# Patient Record
Sex: FEMALE | Race: WHITE | NOT HISPANIC OR LATINO | Employment: OTHER | URBAN - METROPOLITAN AREA
[De-identification: names, ages, dates, MRNs, and addresses within clinical notes are randomized per-mention and may not be internally consistent; named-entity substitution may affect disease eponyms.]

---

## 2018-01-01 ENCOUNTER — APPOINTMENT (OUTPATIENT)
Dept: LAB | Facility: CLINIC | Age: 78
End: 2018-01-01
Attending: INTERNAL MEDICINE
Payer: COMMERCIAL

## 2018-01-01 ENCOUNTER — APPOINTMENT (OUTPATIENT)
Dept: LAB | Facility: CLINIC | Age: 78
End: 2018-01-01
Attending: PHYSICIAN ASSISTANT
Payer: COMMERCIAL

## 2018-01-01 ENCOUNTER — DOCUMENTATION ONLY (OUTPATIENT)
Dept: RADIOLOGY | Facility: CLINIC | Age: 78
End: 2018-01-01

## 2018-01-01 ENCOUNTER — TELEPHONE (OUTPATIENT)
Dept: SURGERY | Facility: CLINIC | Age: 78
End: 2018-01-01

## 2018-01-01 ENCOUNTER — COMMUNICATION - HEALTHEAST (OUTPATIENT)
Dept: SCHEDULING | Facility: CLINIC | Age: 78
End: 2018-01-01

## 2018-01-01 ENCOUNTER — OFFICE VISIT - HEALTHEAST (OUTPATIENT)
Dept: FAMILY MEDICINE | Facility: CLINIC | Age: 78
End: 2018-01-01

## 2018-01-01 ENCOUNTER — ALLIED HEALTH/NURSE VISIT (OUTPATIENT)
Dept: ONCOLOGY | Facility: CLINIC | Age: 78
End: 2018-01-01

## 2018-01-01 ENCOUNTER — COMMUNICATION - HEALTHEAST (OUTPATIENT)
Dept: CARE COORDINATION | Facility: CLINIC | Age: 78
End: 2018-01-01

## 2018-01-01 ENCOUNTER — RESEARCH ENCOUNTER (OUTPATIENT)
Dept: ONCOLOGY | Facility: CLINIC | Age: 78
End: 2018-01-01

## 2018-01-01 ENCOUNTER — CARE COORDINATION (OUTPATIENT)
Dept: ONCOLOGY | Facility: CLINIC | Age: 78
End: 2018-01-01

## 2018-01-01 ENCOUNTER — TELEPHONE (OUTPATIENT)
Dept: ONCOLOGY | Facility: CLINIC | Age: 78
End: 2018-01-01

## 2018-01-01 ENCOUNTER — COMMUNICATION - HEALTHEAST (OUTPATIENT)
Dept: FAMILY MEDICINE | Facility: CLINIC | Age: 78
End: 2018-01-01

## 2018-01-01 ENCOUNTER — RECORDS - HEALTHEAST (OUTPATIENT)
Dept: ADMINISTRATIVE | Facility: OTHER | Age: 78
End: 2018-01-01

## 2018-01-01 ENCOUNTER — HOSPITAL ENCOUNTER (INPATIENT)
Facility: CLINIC | Age: 78
LOS: 3 days | Discharge: HOME HEALTH | DRG: 391 | End: 2018-12-08
Attending: INTERNAL MEDICINE | Admitting: INTERNAL MEDICINE
Payer: COMMERCIAL

## 2018-01-01 ENCOUNTER — HOME CARE/HOSPICE - HEALTHEAST (OUTPATIENT)
Dept: HOSPICE | Facility: HOSPICE | Age: 78
End: 2018-01-01

## 2018-01-01 ENCOUNTER — HOSPITAL ENCOUNTER (OUTPATIENT)
Dept: CT IMAGING | Facility: CLINIC | Age: 78
Discharge: HOME | End: 2018-10-24
Attending: FAMILY MEDICINE

## 2018-01-01 ENCOUNTER — RADIANT APPOINTMENT (OUTPATIENT)
Dept: RADIOLOGY | Facility: AMBULATORY SURGERY CENTER | Age: 78
End: 2018-01-01
Attending: INTERNAL MEDICINE
Payer: COMMERCIAL

## 2018-01-01 ENCOUNTER — PRE VISIT (OUTPATIENT)
Dept: SURGERY | Facility: CLINIC | Age: 78
End: 2018-01-01

## 2018-01-01 ENCOUNTER — ONCOLOGY VISIT (OUTPATIENT)
Dept: ONCOLOGY | Facility: CLINIC | Age: 78
End: 2018-01-01
Attending: PHYSICIAN ASSISTANT
Payer: COMMERCIAL

## 2018-01-01 ENCOUNTER — COMMUNICATION - HEALTHEAST (OUTPATIENT)
Dept: NURSING | Facility: CLINIC | Age: 78
End: 2018-01-01

## 2018-01-01 ENCOUNTER — RADIANT APPOINTMENT (OUTPATIENT)
Dept: GENERAL RADIOLOGY | Facility: CLINIC | Age: 78
End: 2018-01-01
Payer: COMMERCIAL

## 2018-01-01 ENCOUNTER — AMBULATORY - HEALTHEAST (OUTPATIENT)
Dept: LAB | Facility: CLINIC | Age: 78
End: 2018-01-01

## 2018-01-01 ENCOUNTER — HOME CARE/HOSPICE - HEALTHEAST (OUTPATIENT)
Dept: HOME HEALTH SERVICES | Facility: HOME HEALTH | Age: 78
End: 2018-01-01

## 2018-01-01 ENCOUNTER — TRANSFERRED RECORDS (OUTPATIENT)
Dept: HEALTH INFORMATION MANAGEMENT | Facility: CLINIC | Age: 78
End: 2018-01-01

## 2018-01-01 ENCOUNTER — APPOINTMENT (OUTPATIENT)
Dept: OCCUPATIONAL THERAPY | Facility: CLINIC | Age: 78
DRG: 391 | End: 2018-01-01
Attending: PHYSICIAN ASSISTANT
Payer: COMMERCIAL

## 2018-01-01 ENCOUNTER — AMBULATORY - HEALTHEAST (OUTPATIENT)
Dept: FAMILY MEDICINE | Facility: CLINIC | Age: 78
End: 2018-01-01

## 2018-01-01 ENCOUNTER — HOSPITAL ENCOUNTER (OUTPATIENT)
Dept: MRI IMAGING | Facility: HOSPITAL | Age: 78
Discharge: HOME | End: 2018-10-30
Attending: THORACIC SURGERY (CARDIOTHORACIC VASCULAR SURGERY)

## 2018-01-01 ENCOUNTER — PRE VISIT (OUTPATIENT)
Dept: ONCOLOGY | Facility: CLINIC | Age: 78
End: 2018-01-01

## 2018-01-01 ENCOUNTER — PATIENT OUTREACH (OUTPATIENT)
Dept: CARE COORDINATION | Facility: CLINIC | Age: 78
End: 2018-01-01

## 2018-01-01 ENCOUNTER — HOSPITAL ENCOUNTER (OUTPATIENT)
Dept: MRI IMAGING | Facility: HOSPITAL | Age: 78
Discharge: HOME | End: 2018-10-31
Attending: THORACIC SURGERY (CARDIOTHORACIC VASCULAR SURGERY)

## 2018-01-01 ENCOUNTER — HOSPITAL ENCOUNTER (OUTPATIENT)
Facility: AMBULATORY SURGERY CENTER | Age: 78
End: 2018-11-20
Attending: PHYSICIAN ASSISTANT
Payer: COMMERCIAL

## 2018-01-01 ENCOUNTER — SURGERY (OUTPATIENT)
Age: 78
End: 2018-01-01

## 2018-01-01 ENCOUNTER — INFUSION THERAPY VISIT (OUTPATIENT)
Dept: ONCOLOGY | Facility: CLINIC | Age: 78
DRG: 391 | End: 2018-01-01
Attending: INTERNAL MEDICINE
Payer: COMMERCIAL

## 2018-01-01 ENCOUNTER — ONCOLOGY VISIT (OUTPATIENT)
Dept: ONCOLOGY | Facility: CLINIC | Age: 78
End: 2018-01-01
Attending: INTERNAL MEDICINE
Payer: COMMERCIAL

## 2018-01-01 ENCOUNTER — HOSPITAL ENCOUNTER (OUTPATIENT)
Facility: CLINIC | Age: 78
End: 2018-01-01
Payer: COMMERCIAL

## 2018-01-01 ENCOUNTER — TELEPHONE (OUTPATIENT)
Dept: INTERVENTIONAL RADIOLOGY/VASCULAR | Facility: CLINIC | Age: 78
End: 2018-01-01

## 2018-01-01 ENCOUNTER — HOSPITAL ENCOUNTER (OUTPATIENT)
Dept: RADIOLOGY | Facility: HOSPITAL | Age: 78
Discharge: HOME | End: 2018-11-01
Attending: RADIOLOGY

## 2018-01-01 ENCOUNTER — ANESTHESIA - HEALTHEAST (OUTPATIENT)
Dept: SURGERY | Facility: CLINIC | Age: 78
End: 2018-01-01

## 2018-01-01 ENCOUNTER — ONCOLOGY VISIT (OUTPATIENT)
Dept: ONCOLOGY | Facility: CLINIC | Age: 78
DRG: 391 | End: 2018-01-01
Attending: PHYSICIAN ASSISTANT
Payer: COMMERCIAL

## 2018-01-01 ENCOUNTER — ANESTHESIA EVENT (OUTPATIENT)
Dept: SURGERY | Facility: AMBULATORY SURGERY CENTER | Age: 78
End: 2018-01-01

## 2018-01-01 ENCOUNTER — APPOINTMENT (OUTPATIENT)
Dept: CARDIOLOGY | Facility: CLINIC | Age: 78
DRG: 391 | End: 2018-01-01
Attending: INTERNAL MEDICINE
Payer: COMMERCIAL

## 2018-01-01 ENCOUNTER — ANESTHESIA (OUTPATIENT)
Dept: SURGERY | Facility: AMBULATORY SURGERY CENTER | Age: 78
End: 2018-01-01

## 2018-01-01 ENCOUNTER — OFFICE VISIT - HEALTHEAST (OUTPATIENT)
Dept: PULMONOLOGY | Facility: OTHER | Age: 78
End: 2018-01-01

## 2018-01-01 ENCOUNTER — HOSPITAL ENCOUNTER (OUTPATIENT)
Dept: PET IMAGING | Facility: CLINIC | Age: 78
Setting detail: NUCLEAR MEDICINE
Discharge: HOME | End: 2018-11-13
Attending: CLINICAL NURSE SPECIALIST | Admitting: CLINICAL NURSE SPECIALIST
Payer: COMMERCIAL

## 2018-01-01 ENCOUNTER — SURGERY - HEALTHEAST (OUTPATIENT)
Dept: SURGERY | Facility: CLINIC | Age: 78
End: 2018-01-01

## 2018-01-01 ENCOUNTER — APPOINTMENT (OUTPATIENT)
Dept: GENERAL RADIOLOGY | Facility: CLINIC | Age: 78
DRG: 391 | End: 2018-01-01
Attending: PHYSICIAN ASSISTANT
Payer: COMMERCIAL

## 2018-01-01 ENCOUNTER — COMMUNICATION - HEALTHEAST (OUTPATIENT)
Dept: HOME HEALTH SERVICES | Facility: HOME HEALTH | Age: 78
End: 2018-01-01

## 2018-01-01 ENCOUNTER — NURSE TRIAGE (OUTPATIENT)
Dept: NURSING | Facility: CLINIC | Age: 78
End: 2018-01-01

## 2018-01-01 ENCOUNTER — HOSPITAL ENCOUNTER (OUTPATIENT)
Dept: CARDIOLOGY | Facility: CLINIC | Age: 78
Discharge: HOME | End: 2018-11-07
Attending: CLINICAL NURSE SPECIALIST | Admitting: CLINICAL NURSE SPECIALIST
Payer: COMMERCIAL

## 2018-01-01 ENCOUNTER — OFFICE VISIT (OUTPATIENT)
Dept: SURGERY | Facility: CLINIC | Age: 78
End: 2018-01-01
Attending: THORACIC SURGERY (CARDIOTHORACIC VASCULAR SURGERY)
Payer: COMMERCIAL

## 2018-01-01 ENCOUNTER — HOSPITAL ENCOUNTER (OUTPATIENT)
Dept: CT IMAGING | Facility: HOSPITAL | Age: 78
Discharge: HOME | End: 2018-11-01
Attending: THORACIC SURGERY (CARDIOTHORACIC VASCULAR SURGERY) | Admitting: RADIOLOGY

## 2018-01-01 ENCOUNTER — APPOINTMENT (OUTPATIENT)
Dept: CT IMAGING | Facility: CLINIC | Age: 78
DRG: 391 | End: 2018-01-01
Attending: INTERNAL MEDICINE
Payer: COMMERCIAL

## 2018-01-01 ENCOUNTER — AMBULATORY - HEALTHEAST (OUTPATIENT)
Dept: CARE COORDINATION | Facility: CLINIC | Age: 78
End: 2018-01-01

## 2018-01-01 VITALS
HEIGHT: 62 IN | SYSTOLIC BLOOD PRESSURE: 180 MMHG | HEART RATE: 104 BPM | TEMPERATURE: 97.7 F | RESPIRATION RATE: 16 BRPM | BODY MASS INDEX: 23.13 KG/M2 | DIASTOLIC BLOOD PRESSURE: 82 MMHG | WEIGHT: 125.7 LBS | OXYGEN SATURATION: 97 %

## 2018-01-01 VITALS
SYSTOLIC BLOOD PRESSURE: 129 MMHG | HEART RATE: 104 BPM | BODY MASS INDEX: 22.8 KG/M2 | DIASTOLIC BLOOD PRESSURE: 79 MMHG | TEMPERATURE: 98.9 F | OXYGEN SATURATION: 91 % | RESPIRATION RATE: 16 BRPM | WEIGHT: 123.9 LBS | HEIGHT: 62 IN

## 2018-01-01 VITALS
RESPIRATION RATE: 16 BRPM | DIASTOLIC BLOOD PRESSURE: 71 MMHG | WEIGHT: 121 LBS | OXYGEN SATURATION: 95 % | TEMPERATURE: 98.1 F | HEART RATE: 98 BPM | SYSTOLIC BLOOD PRESSURE: 102 MMHG | HEIGHT: 62 IN | BODY MASS INDEX: 22.26 KG/M2

## 2018-01-01 VITALS
TEMPERATURE: 97.7 F | HEART RATE: 96 BPM | BODY MASS INDEX: 22.92 KG/M2 | SYSTOLIC BLOOD PRESSURE: 113 MMHG | HEIGHT: 62 IN | DIASTOLIC BLOOD PRESSURE: 74 MMHG | WEIGHT: 124.56 LBS | OXYGEN SATURATION: 94 %

## 2018-01-01 VITALS
HEART RATE: 86 BPM | DIASTOLIC BLOOD PRESSURE: 87 MMHG | BODY MASS INDEX: 21.89 KG/M2 | SYSTOLIC BLOOD PRESSURE: 148 MMHG | TEMPERATURE: 97.6 F | OXYGEN SATURATION: 94 % | WEIGHT: 119.7 LBS

## 2018-01-01 VITALS
WEIGHT: 115.5 LBS | HEART RATE: 80 BPM | BODY MASS INDEX: 21.13 KG/M2 | DIASTOLIC BLOOD PRESSURE: 72 MMHG | OXYGEN SATURATION: 97 % | TEMPERATURE: 98.4 F | SYSTOLIC BLOOD PRESSURE: 109 MMHG

## 2018-01-01 VITALS
SYSTOLIC BLOOD PRESSURE: 138 MMHG | HEART RATE: 73 BPM | TEMPERATURE: 96.5 F | BODY MASS INDEX: 24.33 KG/M2 | DIASTOLIC BLOOD PRESSURE: 87 MMHG | WEIGHT: 133 LBS | OXYGEN SATURATION: 95 % | RESPIRATION RATE: 18 BRPM

## 2018-01-01 VITALS
RESPIRATION RATE: 16 BRPM | DIASTOLIC BLOOD PRESSURE: 66 MMHG | TEMPERATURE: 97.5 F | OXYGEN SATURATION: 93 % | HEART RATE: 100 BPM | WEIGHT: 122.1 LBS | BODY MASS INDEX: 22.33 KG/M2 | SYSTOLIC BLOOD PRESSURE: 124 MMHG

## 2018-01-01 VITALS
DIASTOLIC BLOOD PRESSURE: 84 MMHG | OXYGEN SATURATION: 97 % | TEMPERATURE: 97.7 F | HEART RATE: 78 BPM | RESPIRATION RATE: 18 BRPM | SYSTOLIC BLOOD PRESSURE: 144 MMHG

## 2018-01-01 VITALS
TEMPERATURE: 98.2 F | DIASTOLIC BLOOD PRESSURE: 85 MMHG | OXYGEN SATURATION: 94 % | BODY MASS INDEX: 22.2 KG/M2 | SYSTOLIC BLOOD PRESSURE: 144 MMHG | WEIGHT: 121.4 LBS | HEART RATE: 105 BPM

## 2018-01-01 VITALS
SYSTOLIC BLOOD PRESSURE: 117 MMHG | TEMPERATURE: 98.8 F | BODY MASS INDEX: 22.33 KG/M2 | OXYGEN SATURATION: 91 % | RESPIRATION RATE: 18 BRPM | HEART RATE: 121 BPM | DIASTOLIC BLOOD PRESSURE: 75 MMHG | WEIGHT: 122.1 LBS

## 2018-01-01 DIAGNOSIS — C34.92 NON-SMALL CELL CANCER OF LEFT LUNG (H): Primary | ICD-10-CM

## 2018-01-01 DIAGNOSIS — R63.4 WEIGHT LOSS: ICD-10-CM

## 2018-01-01 DIAGNOSIS — I10 HYPERTENSION: ICD-10-CM

## 2018-01-01 DIAGNOSIS — D62 ANEMIA DUE TO BLOOD LOSS, ACUTE: Primary | ICD-10-CM

## 2018-01-01 DIAGNOSIS — M25.512 LEFT SHOULDER PAIN: ICD-10-CM

## 2018-01-01 DIAGNOSIS — R63.0 ANOREXIA: ICD-10-CM

## 2018-01-01 DIAGNOSIS — R11.2 NAUSEA AND VOMITING: ICD-10-CM

## 2018-01-01 DIAGNOSIS — E87.1 HYPONATREMIA: ICD-10-CM

## 2018-01-01 DIAGNOSIS — R09.89 LUNG CRACKLES: ICD-10-CM

## 2018-01-01 DIAGNOSIS — R64 MALIGNANT CACHEXIA (H): ICD-10-CM

## 2018-01-01 DIAGNOSIS — I48.91 ATRIAL FIBRILLATION, UNSPECIFIED TYPE (H): ICD-10-CM

## 2018-01-01 DIAGNOSIS — J98.59 MEDIASTINAL MASS: ICD-10-CM

## 2018-01-01 DIAGNOSIS — C34.12 MALIGNANT NEOPLASM OF UPPER LOBE OF LEFT LUNG (H): ICD-10-CM

## 2018-01-01 DIAGNOSIS — E87.5 HYPERKALEMIA: ICD-10-CM

## 2018-01-01 DIAGNOSIS — I10 HTN (HYPERTENSION): ICD-10-CM

## 2018-01-01 DIAGNOSIS — R91.8 MASS OF LEFT LUNG: ICD-10-CM

## 2018-01-01 DIAGNOSIS — D62 ANEMIA DUE TO BLOOD LOSS, ACUTE: ICD-10-CM

## 2018-01-01 DIAGNOSIS — R91.8 MASS OF LUNG: ICD-10-CM

## 2018-01-01 DIAGNOSIS — C34.92 NON-SMALL CELL CANCER OF LEFT LUNG (H): ICD-10-CM

## 2018-01-01 DIAGNOSIS — J90 PLEURAL EFFUSION: ICD-10-CM

## 2018-01-01 DIAGNOSIS — R11.2 NAUSEA AND VOMITING, INTRACTABILITY OF VOMITING NOT SPECIFIED, UNSPECIFIED VOMITING TYPE: ICD-10-CM

## 2018-01-01 DIAGNOSIS — I48.0 PAROXYSMAL ATRIAL FIBRILLATION (H): ICD-10-CM

## 2018-01-01 DIAGNOSIS — Z51.89 ENCOUNTER FOR OTHER SPECIFIED AFTERCARE: ICD-10-CM

## 2018-01-01 DIAGNOSIS — C34.92 NON-SMALL CELL CARCINOMA OF LEFT LUNG, STAGE 4 (H): ICD-10-CM

## 2018-01-01 DIAGNOSIS — E86.0 ACUTE DEHYDRATION: Primary | ICD-10-CM

## 2018-01-01 DIAGNOSIS — R53.83 FATIGUE: ICD-10-CM

## 2018-01-01 DIAGNOSIS — J98.59 MEDIASTINAL MASS: Primary | ICD-10-CM

## 2018-01-01 DIAGNOSIS — R11.0 NAUSEA: ICD-10-CM

## 2018-01-01 DIAGNOSIS — Z71.9 VISIT FOR COUNSELING: Primary | ICD-10-CM

## 2018-01-01 DIAGNOSIS — Z23 NEED FOR VACCINATION: ICD-10-CM

## 2018-01-01 DIAGNOSIS — C34.12 MALIGNANT NEOPLASM OF UPPER LOBE OF LEFT LUNG (H): Primary | ICD-10-CM

## 2018-01-01 DIAGNOSIS — F17.200 TOBACCO USE DISORDER: ICD-10-CM

## 2018-01-01 DIAGNOSIS — M62.81 GENERALIZED MUSCLE WEAKNESS: ICD-10-CM

## 2018-01-01 DIAGNOSIS — K40.90 NON-RECURRENT UNILATERAL INGUINAL HERNIA WITHOUT OBSTRUCTION OR GANGRENE: ICD-10-CM

## 2018-01-01 DIAGNOSIS — E83.42 HYPOMAGNESEMIA: ICD-10-CM

## 2018-01-01 DIAGNOSIS — K59.09 OTHER CONSTIPATION: Primary | ICD-10-CM

## 2018-01-01 DIAGNOSIS — E87.6 HYPOKALEMIA: ICD-10-CM

## 2018-01-01 DIAGNOSIS — K59.00 CONSTIPATION, UNSPECIFIED CONSTIPATION TYPE: ICD-10-CM

## 2018-01-01 DIAGNOSIS — E86.0 DEHYDRATION: ICD-10-CM

## 2018-01-01 DIAGNOSIS — R07.9 ACUTE CHEST PAIN: ICD-10-CM

## 2018-01-01 DIAGNOSIS — D62 ACUTE BLOOD LOSS ANEMIA: ICD-10-CM

## 2018-01-01 DIAGNOSIS — K59.09 OTHER CONSTIPATION: ICD-10-CM

## 2018-01-01 DIAGNOSIS — Z51.11 ENCOUNTER FOR ANTINEOPLASTIC CHEMOTHERAPY: ICD-10-CM

## 2018-01-01 DIAGNOSIS — I10 BENIGN ESSENTIAL HYPERTENSION: ICD-10-CM

## 2018-01-01 DIAGNOSIS — C34.92 SQUAMOUS CELL CARCINOMA OF LEFT LUNG (H): ICD-10-CM

## 2018-01-01 DIAGNOSIS — F17.219 CIGARETTE NICOTINE DEPENDENCE WITH NICOTINE-INDUCED DISORDER: ICD-10-CM

## 2018-01-01 DIAGNOSIS — K26.4 GASTROINTESTINAL HEMORRHAGE ASSOCIATED WITH DUODENAL ULCER: ICD-10-CM

## 2018-01-01 DIAGNOSIS — R63.4 LOSS OF WEIGHT: ICD-10-CM

## 2018-01-01 DIAGNOSIS — F41.9 ANXIETY: ICD-10-CM

## 2018-01-01 DIAGNOSIS — K59.03 DRUG-INDUCED CONSTIPATION: ICD-10-CM

## 2018-01-01 DIAGNOSIS — I10 ESSENTIAL HYPERTENSION: ICD-10-CM

## 2018-01-01 LAB
ABO + RH BLD: ABNORMAL
ACTH PLAS-MCNC: 19 PG/ML
ALBUMIN SERPL-MCNC: 2.1 G/DL (ref 3.4–5)
ALBUMIN SERPL-MCNC: 2.3 G/DL (ref 3.4–5)
ALBUMIN SERPL-MCNC: 2.6 G/DL (ref 3.4–5)
ALBUMIN SERPL-MCNC: 2.6 G/DL (ref 3.4–5)
ALBUMIN SERPL-MCNC: 3 G/DL (ref 3.4–5)
ALBUMIN SERPL-MCNC: 3.7 G/DL (ref 3.5–5)
ALBUMIN SERPL-MCNC: 3.9 G/DL (ref 3.5–5)
ALBUMIN SERPL-MCNC: 4 G/DL (ref 3.5–5)
ALBUMIN UR-MCNC: 30 MG/DL
ALP SERPL-CCNC: 100 U/L (ref 40–150)
ALP SERPL-CCNC: 134 U/L (ref 40–150)
ALP SERPL-CCNC: 136 U/L (ref 40–150)
ALP SERPL-CCNC: 145 U/L (ref 40–150)
ALP SERPL-CCNC: 149 U/L (ref 40–150)
ALP SERPL-CCNC: 61 U/L (ref 45–120)
ALP SERPL-CCNC: 64 U/L (ref 45–120)
ALP SERPL-CCNC: 65 U/L (ref 45–120)
ALP SERPL-CCNC: 66 U/L (ref 45–120)
ALP SERPL-CCNC: 66 U/L (ref 45–120)
ALT SERPL W P-5'-P-CCNC: 12 U/L (ref 0–45)
ALT SERPL W P-5'-P-CCNC: 13 U/L (ref 0–45)
ALT SERPL W P-5'-P-CCNC: 15 U/L (ref 0–45)
ALT SERPL W P-5'-P-CCNC: 17 U/L (ref 0–50)
ALT SERPL W P-5'-P-CCNC: 18 U/L (ref 0–50)
ALT SERPL W P-5'-P-CCNC: 19 U/L (ref 0–45)
ALT SERPL W P-5'-P-CCNC: 20 U/L (ref 0–45)
ALT SERPL W P-5'-P-CCNC: 27 U/L (ref 0–50)
ALT SERPL W P-5'-P-CCNC: 33 U/L (ref 0–50)
ALT SERPL W P-5'-P-CCNC: 34 U/L (ref 0–50)
ANION GAP SERPL CALCULATED.3IONS-SCNC: 10 MMOL/L (ref 5–18)
ANION GAP SERPL CALCULATED.3IONS-SCNC: 10 MMOL/L (ref 5–18)
ANION GAP SERPL CALCULATED.3IONS-SCNC: 11 MMOL/L (ref 3–14)
ANION GAP SERPL CALCULATED.3IONS-SCNC: 11 MMOL/L (ref 5–18)
ANION GAP SERPL CALCULATED.3IONS-SCNC: 12 MMOL/L (ref 3–14)
ANION GAP SERPL CALCULATED.3IONS-SCNC: 12 MMOL/L (ref 5–18)
ANION GAP SERPL CALCULATED.3IONS-SCNC: 12 MMOL/L (ref 5–18)
ANION GAP SERPL CALCULATED.3IONS-SCNC: 7 MMOL/L (ref 3–14)
ANION GAP SERPL CALCULATED.3IONS-SCNC: 7 MMOL/L (ref 3–14)
ANION GAP SERPL CALCULATED.3IONS-SCNC: 8 MMOL/L (ref 3–14)
ANISOCYTOSIS BLD QL SMEAR: SLIGHT
ANISOCYTOSIS BLD QL SMEAR: SLIGHT
APPEARANCE UR: CLEAR
APTT PPP: 37 SEC (ref 22–37)
AST SERPL W P-5'-P-CCNC: 15 U/L (ref 0–40)
AST SERPL W P-5'-P-CCNC: 16 U/L (ref 0–40)
AST SERPL W P-5'-P-CCNC: 16 U/L (ref 0–40)
AST SERPL W P-5'-P-CCNC: 17 U/L (ref 0–40)
AST SERPL W P-5'-P-CCNC: 17 U/L (ref 0–45)
AST SERPL W P-5'-P-CCNC: 18 U/L (ref 0–40)
AST SERPL W P-5'-P-CCNC: 18 U/L (ref 0–45)
AST SERPL W P-5'-P-CCNC: 28 U/L (ref 0–45)
AST SERPL W P-5'-P-CCNC: 30 U/L (ref 0–45)
AST SERPL W P-5'-P-CCNC: 33 U/L (ref 0–45)
BASOPHILS # BLD AUTO: 0 10E9/L (ref 0–0.2)
BASOPHILS # BLD AUTO: 0 THOU/UL (ref 0–0.2)
BASOPHILS # BLD AUTO: 0.1 10E9/L (ref 0–0.2)
BASOPHILS # BLD AUTO: 0.1 THOU/UL (ref 0–0.2)
BASOPHILS NFR BLD AUTO: 0 %
BASOPHILS NFR BLD AUTO: 0 % (ref 0–2)
BASOPHILS NFR BLD AUTO: 0.1 %
BASOPHILS NFR BLD AUTO: 0.4 %
BASOPHILS NFR BLD AUTO: 0.4 %
BASOPHILS NFR BLD AUTO: 0.6 %
BASOPHILS NFR BLD AUTO: 1 % (ref 0–2)
BILIRUB SERPL-MCNC: 0.2 MG/DL (ref 0.2–1.3)
BILIRUB SERPL-MCNC: 0.2 MG/DL (ref 0–1)
BILIRUB SERPL-MCNC: 0.3 MG/DL (ref 0.2–1.3)
BILIRUB SERPL-MCNC: 0.3 MG/DL (ref 0–1)
BILIRUB SERPL-MCNC: 0.4 MG/DL (ref 0.2–1.3)
BILIRUB SERPL-MCNC: 0.4 MG/DL (ref 0.2–1.3)
BILIRUB SERPL-MCNC: 0.4 MG/DL (ref 0–1)
BILIRUB SERPL-MCNC: 0.6 MG/DL (ref 0.2–1.3)
BILIRUB UR QL STRIP: NEGATIVE
BLD GP AB INVEST PLASRBC-IMP: ABNORMAL
BLD GP AB SCN SERPL QL: ABNORMAL
BLD GP AB SCN SERPL QL: ABNORMAL
BLD PROD TYP BPU: ABNORMAL
BLD PROD TYP BPU: ABNORMAL
BLD PROD TYP BPU: NORMAL
BLD PROD TYP BPU: NORMAL
BLD UNIT ID BPU: 0
BLD UNIT ID BPU: 0
BLOOD BANK CMNT PATIENT-IMP: ABNORMAL
BLOOD PRODUCT CODE: NORMAL
BLOOD PRODUCT CODE: NORMAL
BPU ID: NORMAL
BPU ID: NORMAL
BUN SERPL-MCNC: 10 MG/DL (ref 7–30)
BUN SERPL-MCNC: 17 MG/DL (ref 8–28)
BUN SERPL-MCNC: 19 MG/DL (ref 7–30)
BUN SERPL-MCNC: 22 MG/DL (ref 7–30)
BUN SERPL-MCNC: 22 MG/DL (ref 8–28)
BUN SERPL-MCNC: 27 MG/DL (ref 8–28)
BUN SERPL-MCNC: 29 MG/DL (ref 8–28)
BUN SERPL-MCNC: 30 MG/DL (ref 8–28)
BURR CELLS BLD QL SMEAR: ABNORMAL
BURR CELLS BLD QL SMEAR: SLIGHT
CALCIUM SERPL-MCNC: 10.1 MG/DL (ref 8.5–10.5)
CALCIUM SERPL-MCNC: 10.2 MG/DL (ref 8.5–10.5)
CALCIUM SERPL-MCNC: 7.5 MG/DL (ref 8.5–10.1)
CALCIUM SERPL-MCNC: 8 MG/DL (ref 8.5–10.1)
CALCIUM SERPL-MCNC: 8.1 MG/DL (ref 8.5–10.1)
CALCIUM SERPL-MCNC: 8.3 MG/DL (ref 8.5–10.1)
CALCIUM SERPL-MCNC: 8.3 MG/DL (ref 8.5–10.1)
CALCIUM SERPL-MCNC: 8.4 MG/DL (ref 8.5–10.1)
CALCIUM SERPL-MCNC: 9.1 MG/DL (ref 8.5–10.1)
CALCIUM SERPL-MCNC: 9.6 MG/DL (ref 8.5–10.5)
CALCIUM SERPL-MCNC: 9.7 MG/DL (ref 8.5–10.5)
CALCIUM SERPL-MCNC: 9.9 MG/DL (ref 8.5–10.5)
CCTA EJECTION FRACTION: 85 %
CCTA INTERVENTRICULAR SETPUM: 1.1 CM (ref 0.6–1.1)
CCTA LEFT INTERNAL DIMENSION IN SYSTOLE: 2.3 CM (ref 2.1–4)
CCTA LEFT VENTRICULAR INTERNAL DIMENSION IN DIASTOLE: 4.6 CM (ref 3.5–6)
CCTA LEFT VENTRICULAR MASS: 158.81 G
CCTA POSTERIOR WALL: 0.9 CM (ref 0.6–1.1)
CHLORIDE BLD-SCNC: 101 MMOL/L (ref 98–107)
CHLORIDE BLD-SCNC: 94 MMOL/L (ref 98–107)
CHLORIDE BLD-SCNC: 95 MMOL/L (ref 98–107)
CHLORIDE BLD-SCNC: 96 MMOL/L (ref 98–107)
CHLORIDE BLD-SCNC: 99 MMOL/L (ref 98–107)
CHLORIDE SERPL-SCNC: 105 MMOL/L (ref 94–109)
CHLORIDE SERPL-SCNC: 92 MMOL/L (ref 94–109)
CHLORIDE SERPL-SCNC: 93 MMOL/L (ref 94–109)
CHLORIDE SERPL-SCNC: 94 MMOL/L (ref 94–109)
CHLORIDE SERPL-SCNC: 96 MMOL/L (ref 94–109)
CHLORIDE SERPL-SCNC: 97 MMOL/L (ref 94–109)
CHLORIDE SERPL-SCNC: 97 MMOL/L (ref 94–109)
CHOLEST SERPL-MCNC: 207 MG/DL
CO2 SERPL-SCNC: 23 MMOL/L (ref 20–32)
CO2 SERPL-SCNC: 23 MMOL/L (ref 20–32)
CO2 SERPL-SCNC: 24 MMOL/L (ref 20–32)
CO2 SERPL-SCNC: 24 MMOL/L (ref 22–31)
CO2 SERPL-SCNC: 24 MMOL/L (ref 22–31)
CO2 SERPL-SCNC: 25 MMOL/L (ref 20–32)
CO2 SERPL-SCNC: 27 MMOL/L (ref 22–31)
CO2 SERPL-SCNC: 27 MMOL/L (ref 22–31)
CO2 SERPL-SCNC: 28 MMOL/L (ref 20–32)
CO2 SERPL-SCNC: 28 MMOL/L (ref 22–31)
COLOR UR AUTO: YELLOW
COPATH REPORT: NORMAL
CREAT BLD-MCNC: 1 MG/DL (ref 0.52–1.04)
CREAT SERPL-MCNC: 0.56 MG/DL (ref 0.52–1.04)
CREAT SERPL-MCNC: 0.59 MG/DL (ref 0.52–1.04)
CREAT SERPL-MCNC: 0.61 MG/DL (ref 0.52–1.04)
CREAT SERPL-MCNC: 0.62 MG/DL (ref 0.52–1.04)
CREAT SERPL-MCNC: 0.63 MG/DL (ref 0.52–1.04)
CREAT SERPL-MCNC: 0.66 MG/DL (ref 0.52–1.04)
CREAT SERPL-MCNC: 0.67 MG/DL (ref 0.52–1.04)
CREAT SERPL-MCNC: 0.7 MG/DL (ref 0.6–1.1)
CREAT SERPL-MCNC: 0.73 MG/DL (ref 0.6–1.1)
CREAT SERPL-MCNC: 0.74 MG/DL (ref 0.6–1.1)
CREAT SERPL-MCNC: 0.75 MG/DL (ref 0.6–1.1)
CREAT SERPL-MCNC: 0.76 MG/DL (ref 0.52–1.04)
CREAT SERPL-MCNC: 0.78 MG/DL (ref 0.6–1.1)
DIFFERENTIAL METHOD BLD: ABNORMAL
DLCOUNC-%PRED-PRE: 65 %
DLCOUNC-PRE: 12.47 ML/MIN/MMHG
DLCOUNC-PRED: 18.91 ML/MIN/MMHG
EOSINOPHIL # BLD AUTO: 0 10E9/L (ref 0–0.7)
EOSINOPHIL # BLD AUTO: 0.1 10E9/L (ref 0–0.7)
EOSINOPHIL # BLD AUTO: 0.2 10E9/L (ref 0–0.7)
EOSINOPHIL # BLD AUTO: 0.2 10E9/L (ref 0–0.7)
EOSINOPHIL # BLD AUTO: 0.2 THOU/UL (ref 0–0.4)
EOSINOPHIL # BLD AUTO: 0.3 10E9/L (ref 0–0.7)
EOSINOPHIL # BLD AUTO: 0.3 THOU/UL (ref 0–0.4)
EOSINOPHIL NFR BLD AUTO: 0 %
EOSINOPHIL NFR BLD AUTO: 0.4 %
EOSINOPHIL NFR BLD AUTO: 0.9 %
EOSINOPHIL NFR BLD AUTO: 2 % (ref 0–6)
EOSINOPHIL NFR BLD AUTO: 2.4 %
EOSINOPHIL NFR BLD AUTO: 3 % (ref 0–6)
EOSINOPHIL NFR BLD AUTO: 3.7 %
ERV-%PRED-PRE: 51 %
ERV-PRE: 0.35 L
ERV-PRED: 0.67 L
ERYTHROCYTE [DISTWIDTH] IN BLOOD BY AUTOMATED COUNT: 12.7 % (ref 11–14.5)
ERYTHROCYTE [DISTWIDTH] IN BLOOD BY AUTOMATED COUNT: 13.2 % (ref 10–15)
ERYTHROCYTE [DISTWIDTH] IN BLOOD BY AUTOMATED COUNT: 13.2 % (ref 10–15)
ERYTHROCYTE [DISTWIDTH] IN BLOOD BY AUTOMATED COUNT: 13.3 % (ref 10–15)
ERYTHROCYTE [DISTWIDTH] IN BLOOD BY AUTOMATED COUNT: 14.1 % (ref 10–15)
ERYTHROCYTE [DISTWIDTH] IN BLOOD BY AUTOMATED COUNT: 14.5 % (ref 10–15)
ERYTHROCYTE [DISTWIDTH] IN BLOOD BY AUTOMATED COUNT: 14.5 % (ref 11–14.5)
ERYTHROCYTE [DISTWIDTH] IN BLOOD BY AUTOMATED COUNT: 15.8 % (ref 10–15)
ERYTHROCYTE [DISTWIDTH] IN BLOOD BY AUTOMATED COUNT: 16.9 % (ref 10–15)
EXPTIME-PRE: 6.02 SEC
FASTING STATUS PATIENT QL REPORTED: NO
FEF2575-%PRED-POST: 52 %
FEF2575-%PRED-PRE: 29 %
FEF2575-POST: 0.82 L/SEC
FEF2575-PRE: 0.45 L/SEC
FEF2575-PRED: 1.56 L/SEC
FEFMAX-%PRED-PRE: 33 %
FEFMAX-PRE: 1.59 L/SEC
FEFMAX-PRED: 4.75 L/SEC
FEV1-%PRED-PRE: 38 %
FEV1-PRE: 0.73 L
FEV1FEV6-PRE: 67 %
FEV1FEV6-PRED: 78 %
FEV1FVC-PRE: 67 %
FEV1FVC-PRED: 74 %
FEV1SVC-PRE: 59 %
FEV1SVC-PRED: 71 %
FIFMAX-PRE: 1.44 L/SEC
FRCPLETH-%PRED-PRE: 134 %
FRCPLETH-PRE: 3.49 L
FRCPLETH-PRED: 2.6 L
FVC-%PRED-PRE: 44 %
FVC-PRE: 1.09 L
FVC-PRED: 2.44 L
GFR SERPL CREATININE-BSD FRML MDRD: 54 ML/MIN/1.7M2
GFR SERPL CREATININE-BSD FRML MDRD: 74 ML/MIN/1.7M2
GFR SERPL CREATININE-BSD FRML MDRD: 85 ML/MIN/{1.73_M2}
GFR SERPL CREATININE-BSD FRML MDRD: 86 ML/MIN/1.7M2
GFR SERPL CREATININE-BSD FRML MDRD: >60 ML/MIN/1.73M2
GFR SERPL CREATININE-BSD FRML MDRD: >90 ML/MIN/1.7M2
GLUCOSE BLD-MCNC: 110 MG/DL (ref 70–125)
GLUCOSE BLD-MCNC: 118 MG/DL (ref 70–125)
GLUCOSE BLD-MCNC: 137 MG/DL (ref 70–125)
GLUCOSE BLD-MCNC: 87 MG/DL (ref 70–125)
GLUCOSE BLD-MCNC: 91 MG/DL (ref 70–125)
GLUCOSE BLDC GLUCOMTR-MCNC: 104 MG/DL (ref 70–99)
GLUCOSE SERPL-MCNC: 105 MG/DL (ref 70–99)
GLUCOSE SERPL-MCNC: 112 MG/DL (ref 70–99)
GLUCOSE SERPL-MCNC: 120 MG/DL (ref 70–99)
GLUCOSE SERPL-MCNC: 174 MG/DL (ref 70–99)
GLUCOSE SERPL-MCNC: 81 MG/DL (ref 70–99)
GLUCOSE SERPL-MCNC: 84 MG/DL (ref 70–99)
GLUCOSE SERPL-MCNC: 99 MG/DL (ref 70–99)
GLUCOSE UR STRIP-MCNC: NEGATIVE MG/DL
HCT VFR BLD AUTO: 24.9 % (ref 35–47)
HCT VFR BLD AUTO: 27.6 % (ref 35–47)
HCT VFR BLD AUTO: 31.4 % (ref 35–47)
HCT VFR BLD AUTO: 32 % (ref 35–47)
HCT VFR BLD AUTO: 32.2 % (ref 35–47)
HCT VFR BLD AUTO: 32.6 % (ref 35–47)
HCT VFR BLD AUTO: 33 % (ref 35–47)
HCT VFR BLD AUTO: 33.1 % (ref 35–47)
HCT VFR BLD AUTO: 34 % (ref 35–47)
HCT VFR BLD AUTO: 34.6 % (ref 35–47)
HCT VFR BLD AUTO: 38.9 % (ref 35–47)
HDLC SERPL-MCNC: 55 MG/DL
HGB BLD-MCNC: 10.1 G/DL (ref 11.7–15.7)
HGB BLD-MCNC: 10.1 G/DL (ref 11.7–15.7)
HGB BLD-MCNC: 10.5 G/DL (ref 11.7–15.7)
HGB BLD-MCNC: 10.5 G/DL (ref 11.7–15.7)
HGB BLD-MCNC: 10.8 G/DL (ref 11.7–15.7)
HGB BLD-MCNC: 11.2 G/DL (ref 11.7–15.7)
HGB BLD-MCNC: 11.3 G/DL (ref 11.7–15.7)
HGB BLD-MCNC: 12.8 G/DL (ref 12–16)
HGB BLD-MCNC: 13.5 G/DL (ref 12–16)
HGB BLD-MCNC: 7.8 G/DL (ref 11.7–15.7)
HGB BLD-MCNC: 9 G/DL (ref 12–16)
HGB BLD-MCNC: 9.7 G/DL (ref 11.7–15.7)
HGB UR QL STRIP: NEGATIVE
IC-%PRED-PRE: 45 %
IC-PRE: 0.9 L
IC-PRED: 1.99 L
IMM GRANULOCYTES # BLD: 0 10E9/L (ref 0–0.4)
IMM GRANULOCYTES # BLD: 0 10E9/L (ref 0–0.4)
IMM GRANULOCYTES # BLD: 0.1 10E9/L (ref 0–0.4)
IMM GRANULOCYTES # BLD: 0.3 10E9/L (ref 0–0.4)
IMM GRANULOCYTES NFR BLD: 0.3 %
IMM GRANULOCYTES NFR BLD: 0.4 %
IMM GRANULOCYTES NFR BLD: 0.4 %
IMM GRANULOCYTES NFR BLD: 1.1 %
INR PPP: 0.99 (ref 0.9–1.1)
INR PPP: 1.03 (ref 0.86–1.14)
INTERPRETATION ECG - MUSE: NORMAL
INTERPRETATION ECG - MUSE: NORMAL
KETONES UR STRIP-MCNC: 10 MG/DL
LACTATE BLD-SCNC: 0.6 MMOL/L (ref 0.7–2)
LACTATE BLD-SCNC: 0.9 MMOL/L (ref 0.7–2)
LACTATE BLD-SCNC: NORMAL MMOL/L (ref 0.7–2)
LDLC SERPL CALC-MCNC: 132 MG/DL
LEUKOCYTE ESTERASE UR QL STRIP: NEGATIVE
LYMPHOCYTES # BLD AUTO: 0.5 10E9/L (ref 0.8–5.3)
LYMPHOCYTES # BLD AUTO: 0.7 10E9/L (ref 0.8–5.3)
LYMPHOCYTES # BLD AUTO: 0.8 10E9/L (ref 0.8–5.3)
LYMPHOCYTES # BLD AUTO: 0.8 10E9/L (ref 0.8–5.3)
LYMPHOCYTES # BLD AUTO: 0.8 THOU/UL (ref 0.8–4.4)
LYMPHOCYTES # BLD AUTO: 1 10E9/L (ref 0.8–5.3)
LYMPHOCYTES # BLD AUTO: 1.1 10E9/L (ref 0.8–5.3)
LYMPHOCYTES # BLD AUTO: 1.4 10E9/L (ref 0.8–5.3)
LYMPHOCYTES # BLD AUTO: 1.5 10E9/L (ref 0.8–5.3)
LYMPHOCYTES # BLD AUTO: 1.9 THOU/UL (ref 0.8–4.4)
LYMPHOCYTES NFR BLD AUTO: 1.7 %
LYMPHOCYTES NFR BLD AUTO: 13 %
LYMPHOCYTES NFR BLD AUTO: 15.6 %
LYMPHOCYTES NFR BLD AUTO: 19 % (ref 20–40)
LYMPHOCYTES NFR BLD AUTO: 2.3 %
LYMPHOCYTES NFR BLD AUTO: 2.6 %
LYMPHOCYTES NFR BLD AUTO: 5.2 %
LYMPHOCYTES NFR BLD AUTO: 5.7 %
LYMPHOCYTES NFR BLD AUTO: 7 % (ref 20–40)
LYMPHOCYTES NFR BLD AUTO: 8.8 %
MAGNESIUM SERPL-MCNC: 1.5 MG/DL (ref 1.6–2.3)
MAGNESIUM SERPL-MCNC: 1.8 MG/DL (ref 1.6–2.3)
MAGNESIUM SERPL-MCNC: 2 MG/DL (ref 1.6–2.3)
MAGNESIUM SERPL-MCNC: 2 MG/DL (ref 1.8–2.6)
MAGNESIUM SERPL-MCNC: 2.4 MG/DL (ref 1.6–2.3)
MCH RBC QN AUTO: 28.4 PG (ref 26.5–33)
MCH RBC QN AUTO: 28.6 PG (ref 26.5–33)
MCH RBC QN AUTO: 28.7 PG (ref 26.5–33)
MCH RBC QN AUTO: 28.8 PG (ref 26.5–33)
MCH RBC QN AUTO: 28.9 PG (ref 26.5–33)
MCH RBC QN AUTO: 29 PG (ref 26.5–33)
MCH RBC QN AUTO: 29 PG (ref 26.5–33)
MCH RBC QN AUTO: 29.2 PG (ref 26.5–33)
MCH RBC QN AUTO: 29.4 PG (ref 27–34)
MCH RBC QN AUTO: 29.6 PG (ref 26.5–33)
MCH RBC QN AUTO: 30.7 PG (ref 27–34)
MCHC RBC AUTO-ENTMCNC: 30.9 G/DL (ref 31.5–36.5)
MCHC RBC AUTO-ENTMCNC: 31.3 G/DL (ref 31.5–36.5)
MCHC RBC AUTO-ENTMCNC: 31.4 G/DL (ref 31.5–36.5)
MCHC RBC AUTO-ENTMCNC: 31.6 G/DL (ref 31.5–36.5)
MCHC RBC AUTO-ENTMCNC: 31.8 G/DL (ref 31.5–36.5)
MCHC RBC AUTO-ENTMCNC: 32.2 G/DL (ref 31.5–36.5)
MCHC RBC AUTO-ENTMCNC: 32.4 G/DL (ref 31.5–36.5)
MCHC RBC AUTO-ENTMCNC: 32.6 G/DL (ref 31.5–36.5)
MCHC RBC AUTO-ENTMCNC: 32.8 G/DL (ref 32–36)
MCHC RBC AUTO-ENTMCNC: 33.2 G/DL (ref 31.5–36.5)
MCHC RBC AUTO-ENTMCNC: 34.6 G/DL (ref 32–36)
MCV RBC AUTO: 89 FL (ref 78–100)
MCV RBC AUTO: 89 FL (ref 80–100)
MCV RBC AUTO: 89 FL (ref 80–100)
MCV RBC AUTO: 90 FL (ref 78–100)
MCV RBC AUTO: 91 FL (ref 78–100)
MCV RBC AUTO: 91 FL (ref 78–100)
MCV RBC AUTO: 92 FL (ref 78–100)
MCV RBC AUTO: 92 FL (ref 78–100)
MCV RBC AUTO: 93 FL (ref 78–100)
METAMYELOCYTES # BLD: 0.3 10E9/L
METAMYELOCYTES # BLD: 0.5 10E9/L
METAMYELOCYTES NFR BLD MANUAL: 1.7 %
METAMYELOCYTES NFR BLD MANUAL: 1.8 %
MONOCYTES # BLD AUTO: 0.4 10E9/L (ref 0–1.3)
MONOCYTES # BLD AUTO: 0.5 10E9/L (ref 0–1.3)
MONOCYTES # BLD AUTO: 0.5 THOU/UL (ref 0–0.9)
MONOCYTES # BLD AUTO: 0.6 THOU/UL (ref 0–0.9)
MONOCYTES # BLD AUTO: 0.7 10E9/L (ref 0–1.3)
MONOCYTES # BLD AUTO: 1 10E9/L (ref 0–1.3)
MONOCYTES # BLD AUTO: 1.2 10E9/L (ref 0–1.3)
MONOCYTES # BLD AUTO: 1.6 10E9/L (ref 0–1.3)
MONOCYTES # BLD AUTO: 1.7 10E9/L (ref 0–1.3)
MONOCYTES # BLD AUTO: 2.5 10E9/L (ref 0–1.3)
MONOCYTES NFR BLD AUTO: 14.1 %
MONOCYTES NFR BLD AUTO: 2.3 %
MONOCYTES NFR BLD AUTO: 3.5 %
MONOCYTES NFR BLD AUTO: 5 % (ref 2–10)
MONOCYTES NFR BLD AUTO: 5 % (ref 2–10)
MONOCYTES NFR BLD AUTO: 5.3 %
MONOCYTES NFR BLD AUTO: 6 %
MONOCYTES NFR BLD AUTO: 6.2 %
MONOCYTES NFR BLD AUTO: 6.4 %
MONOCYTES NFR BLD AUTO: 8.8 %
MR CARDIAC LEFT VENTRIAL CARDIAC INDEX: 2.8 L/MIN/M2 (ref 1.7–4.2)
MR CARDIAC LEFT VENTRICAL CARDIAC OUTPUT: 4.38 L/MIN (ref 2.8–8.8)
MR CARDIAC LEFT VENTRICULAR DIASTOLIC VOLUME INDEX: 45.67 ML/M2 (ref 47–92)
MR CARDIAC LEFT VENTRICULAR MASS INDEX: 68.22 G/M2 (ref 70–113)
MR CARDIAC LEFT VENTRICULAR MASS: 108 G (ref 75–175)
MR CARDIAC LEFT VENTRICULAR STROKE VOLUME INDEX: 28.68 ML/M2 (ref 32–62)
MR CARDIAC LEFT VENTRICULAR SYSTOLIC VOLUME INDEX: 16.99 ML/M2 (ref 13–30)
MR EJECTION FRACTION: 62.79 %
MR HEIGHT: 1.57 M
MR LEFT VENTRICULAR DYSTOLIC VOLUMEN: 72.3 ML (ref 52–141)
MR LEFT VENTRICULAR STROKE VOLUMEN: 45.4 ML (ref 51–133)
MR LEFT VENTRICULAR SYSTOLIC VOLUME: 26.9 ML (ref 13–51)
MR WEIGHT: 58.2 KG
MUCOUS THREADS #/AREA URNS LPF: PRESENT /LPF
MYELOCYTES # BLD: 0.3 10E9/L
MYELOCYTES NFR BLD MANUAL: 0.9 %
MYELOCYTES NFR BLD MANUAL: 0.9 %
MYELOCYTES NFR BLD MANUAL: 1.8 %
NEUTROPHILS # BLD AUTO: 11.2 10E9/L (ref 1.6–8.3)
NEUTROPHILS # BLD AUTO: 12.7 10E9/L (ref 1.6–8.3)
NEUTROPHILS # BLD AUTO: 23.3 10E9/L (ref 1.6–8.3)
NEUTROPHILS # BLD AUTO: 26.9 10E9/L (ref 1.6–8.3)
NEUTROPHILS # BLD AUTO: 28.2 10E9/L (ref 1.6–8.3)
NEUTROPHILS # BLD AUTO: 29.5 10E9/L (ref 1.6–8.3)
NEUTROPHILS # BLD AUTO: 5.2 10E9/L (ref 1.6–8.3)
NEUTROPHILS # BLD AUTO: 6.1 10E9/L (ref 1.6–8.3)
NEUTROPHILS # BLD AUTO: 7.3 THOU/UL (ref 2–7.7)
NEUTROPHILS # BLD AUTO: 9.2 THOU/UL (ref 2–7.7)
NEUTROPHILS NFR BLD AUTO: 72.6 %
NEUTROPHILS NFR BLD AUTO: 73.6 %
NEUTROPHILS NFR BLD AUTO: 74 % (ref 50–70)
NEUTROPHILS NFR BLD AUTO: 77.4 %
NEUTROPHILS NFR BLD AUTO: 84 % (ref 50–70)
NEUTROPHILS NFR BLD AUTO: 84.3 %
NEUTROPHILS NFR BLD AUTO: 87.1 %
NEUTROPHILS NFR BLD AUTO: 91.2 %
NEUTROPHILS NFR BLD AUTO: 93.9 %
NEUTROPHILS NFR BLD AUTO: 94.2 %
NITRATE UR QL: NEGATIVE
NRBC # BLD AUTO: 0 10*3/UL
NRBC BLD AUTO-RTO: 0 /100
NUM BPU REQUESTED: 2
NUM BPU REQUESTED: 2
OSMOLALITY SERPL: 277 MMOL/KG (ref 280–301)
OSMOLALITY UR: 677 MMOL/KG (ref 100–1200)
PD-L1 BY IMMUNOHISTOCHEMISTRY: NORMAL
PH UR STRIP: 6 PH (ref 5–7)
PHOSPHATE SERPL-MCNC: 2.2 MG/DL (ref 2.5–4.5)
PHOSPHATE SERPL-MCNC: 2.9 MG/DL (ref 2.5–4.5)
PHOSPHATE SERPL-MCNC: 3.3 MG/DL (ref 2.5–4.5)
PLATELET # BLD AUTO: 329 10E9/L (ref 150–450)
PLATELET # BLD AUTO: 331 10E9/L (ref 150–450)
PLATELET # BLD AUTO: 364 10E9/L (ref 150–450)
PLATELET # BLD AUTO: 372 10E9/L (ref 150–450)
PLATELET # BLD AUTO: 383 10E9/L (ref 150–450)
PLATELET # BLD AUTO: 389 10E9/L (ref 150–450)
PLATELET # BLD AUTO: 399 THOU/UL (ref 140–440)
PLATELET # BLD AUTO: 403 10E9/L (ref 150–450)
PLATELET # BLD AUTO: 479 THOU/UL (ref 140–440)
PLATELET # BLD AUTO: 487 10E9/L (ref 150–450)
PLATELET # BLD AUTO: 490 THOU/UL (ref 140–440)
PLATELET # BLD AUTO: 504 10E9/L (ref 150–450)
PLATELET # BLD AUTO: 506 10E9/L (ref 150–450)
PLATELET # BLD EST: ABNORMAL 10*3/UL
PMV BLD AUTO: 6.8 FL (ref 7–10)
PMV BLD AUTO: 6.9 FL (ref 7–10)
POIKILOCYTOSIS BLD QL SMEAR: ABNORMAL
POIKILOCYTOSIS BLD QL SMEAR: ABNORMAL
POIKILOCYTOSIS BLD QL SMEAR: SLIGHT
POIKILOCYTOSIS BLD QL SMEAR: SLIGHT
POTASSIUM BLD-SCNC: 4.7 MMOL/L (ref 3.5–5)
POTASSIUM BLD-SCNC: 4.8 MMOL/L (ref 3.5–5)
POTASSIUM BLD-SCNC: 5 MMOL/L (ref 3.5–5)
POTASSIUM BLD-SCNC: 5.2 MMOL/L (ref 3.5–5)
POTASSIUM BLD-SCNC: 5.3 MMOL/L (ref 3.5–5)
POTASSIUM BLD-SCNC: 5.6 MMOL/L (ref 3.5–5)
POTASSIUM SERPL-SCNC: 3.7 MMOL/L (ref 3.4–5.3)
POTASSIUM SERPL-SCNC: 4 MMOL/L (ref 3.4–5.3)
POTASSIUM SERPL-SCNC: 4.2 MMOL/L (ref 3.4–5.3)
POTASSIUM SERPL-SCNC: 4.4 MMOL/L (ref 3.4–5.3)
POTASSIUM SERPL-SCNC: 4.4 MMOL/L (ref 3.4–5.3)
POTASSIUM SERPL-SCNC: 4.6 MMOL/L (ref 3.4–5.3)
POTASSIUM SERPL-SCNC: 4.6 MMOL/L (ref 3.4–5.3)
PROT SERPL-MCNC: 5.9 G/DL (ref 6.8–8.8)
PROT SERPL-MCNC: 6.2 G/DL (ref 6.8–8.8)
PROT SERPL-MCNC: 6.5 G/DL (ref 6.8–8.8)
PROT SERPL-MCNC: 6.7 G/DL (ref 6.8–8.8)
PROT SERPL-MCNC: 6.8 G/DL (ref 6–8)
PROT SERPL-MCNC: 7 G/DL (ref 6–8)
PROT SERPL-MCNC: 7 G/DL (ref 6–8)
PROT SERPL-MCNC: 7.3 G/DL (ref 6–8)
PROT SERPL-MCNC: 7.3 G/DL (ref 6–8)
PROT SERPL-MCNC: 7.7 G/DL (ref 6.8–8.8)
RBC # BLD AUTO: 2.71 10E12/L (ref 3.8–5.2)
RBC # BLD AUTO: 3.08 MILL/UL (ref 3.8–5.4)
RBC # BLD AUTO: 3.41 10E12/L (ref 3.8–5.2)
RBC # BLD AUTO: 3.48 10E12/L (ref 3.8–5.2)
RBC # BLD AUTO: 3.52 10E12/L (ref 3.8–5.2)
RBC # BLD AUTO: 3.63 10E12/L (ref 3.8–5.2)
RBC # BLD AUTO: 3.67 10E12/L (ref 3.8–5.2)
RBC # BLD AUTO: 3.73 10E12/L (ref 3.8–5.2)
RBC # BLD AUTO: 3.82 10E12/L (ref 3.8–5.2)
RBC # BLD AUTO: 3.84 10E12/L (ref 3.8–5.2)
RBC # BLD AUTO: 4.39 MILL/UL (ref 3.8–5.4)
RBC #/AREA URNS AUTO: 1 /HPF (ref 0–2)
RBC INCLUSIONS BLD: SLIGHT
RVPLETH-%PRED-PRE: 151 %
RVPLETH-PRE: 3.14 L
RVPLETH-PRED: 2.08 L
SODIUM SERPL-SCNC: 127 MMOL/L (ref 133–144)
SODIUM SERPL-SCNC: 128 MMOL/L (ref 133–144)
SODIUM SERPL-SCNC: 128 MMOL/L (ref 133–144)
SODIUM SERPL-SCNC: 129 MMOL/L (ref 133–144)
SODIUM SERPL-SCNC: 130 MMOL/L (ref 133–144)
SODIUM SERPL-SCNC: 130 MMOL/L (ref 136–145)
SODIUM SERPL-SCNC: 132 MMOL/L (ref 133–144)
SODIUM SERPL-SCNC: 132 MMOL/L (ref 133–144)
SODIUM SERPL-SCNC: 132 MMOL/L (ref 136–145)
SODIUM SERPL-SCNC: 133 MMOL/L (ref 136–145)
SODIUM SERPL-SCNC: 136 MMOL/L (ref 136–145)
SODIUM SERPL-SCNC: 136 MMOL/L (ref 136–145)
SODIUM SERPL-SCNC: 138 MMOL/L (ref 133–144)
SODIUM SERPL-SCNC: 139 MMOL/L (ref 136–145)
SODIUM UR-SCNC: 14 MMOL/L
SOURCE: ABNORMAL
SP GR UR STRIP: 1.02 (ref 1–1.03)
SPECIMEN EXP DATE BLD: ABNORMAL
SPECIMEN EXP DATE BLD: ABNORMAL
SQUAMOUS #/AREA URNS AUTO: <1 /HPF (ref 0–1)
TARGETS BLD QL SMEAR: SLIGHT
TLCPLETH-%PRED-PRE: 95 %
TLCPLETH-PRE: 4.38 L
TLCPLETH-PRED: 4.6 L
TRANSFUSION STATUS PATIENT QL: NORMAL
TRIGL SERPL-MCNC: 100 MG/DL
UROBILINOGEN UR STRIP-MCNC: 2 MG/DL (ref 0–2)
VA-%PRED-PRE: 55 %
VA-PRE: 2.64 L
VC-%PRED-PRE: 46 %
VC-PRE: 1.24 L
VC-PRED: 2.66 L
WBC # BLD AUTO: 10.7 10E9/L (ref 4–11)
WBC # BLD AUTO: 13.3 10E9/L (ref 4–11)
WBC # BLD AUTO: 17.5 10E9/L (ref 4–11)
WBC # BLD AUTO: 26.8 10E9/L (ref 4–11)
WBC # BLD AUTO: 28.7 10E9/L (ref 4–11)
WBC # BLD AUTO: 29.9 10E9/L (ref 4–11)
WBC # BLD AUTO: 32.4 10E9/L (ref 4–11)
WBC # BLD AUTO: 7.1 10E9/L (ref 4–11)
WBC # BLD AUTO: 7.9 10E9/L (ref 4–11)
WBC #/AREA URNS AUTO: 3 /HPF (ref 0–5)
WBC: 10.9 THOU/UL (ref 4–11)
WBC: 9.9 THOU/UL (ref 4–11)

## 2018-01-01 PROCEDURE — 86902 BLOOD TYPE ANTIGEN DONOR EA: CPT | Performed by: PHYSICIAN ASSISTANT

## 2018-01-01 PROCEDURE — 25000132 ZZH RX MED GY IP 250 OP 250 PS 637: Performed by: PHYSICIAN ASSISTANT

## 2018-01-01 PROCEDURE — 25000132 ZZH RX MED GY IP 250 OP 250 PS 637: Performed by: INTERNAL MEDICINE

## 2018-01-01 PROCEDURE — 93306 TTE W/DOPPLER COMPLETE: CPT | Mod: 26 | Performed by: INTERNAL MEDICINE

## 2018-01-01 PROCEDURE — 85025 COMPLETE CBC W/AUTO DIFF WBC: CPT | Performed by: INTERNAL MEDICINE

## 2018-01-01 PROCEDURE — 25000125 ZZHC RX 250: Performed by: PHYSICIAN ASSISTANT

## 2018-01-01 PROCEDURE — 97165 OT EVAL LOW COMPLEX 30 MIN: CPT | Mod: GO

## 2018-01-01 PROCEDURE — 99233 SBSQ HOSP IP/OBS HIGH 50: CPT | Performed by: INTERNAL MEDICINE

## 2018-01-01 PROCEDURE — 25000128 H RX IP 250 OP 636: Performed by: PHYSICIAN ASSISTANT

## 2018-01-01 PROCEDURE — 96413 CHEMO IV INFUSION 1 HR: CPT

## 2018-01-01 PROCEDURE — 85025 COMPLETE CBC W/AUTO DIFF WBC: CPT | Performed by: PHYSICIAN ASSISTANT

## 2018-01-01 PROCEDURE — 80053 COMPREHEN METABOLIC PANEL: CPT | Performed by: INTERNAL MEDICINE

## 2018-01-01 PROCEDURE — 25000128 H RX IP 250 OP 636: Mod: ZF | Performed by: INTERNAL MEDICINE

## 2018-01-01 PROCEDURE — 83605 ASSAY OF LACTIC ACID: CPT | Performed by: INTERNAL MEDICINE

## 2018-01-01 PROCEDURE — 86901 BLOOD TYPING SEROLOGIC RH(D): CPT | Performed by: PHYSICIAN ASSISTANT

## 2018-01-01 PROCEDURE — 86850 RBC ANTIBODY SCREEN: CPT | Performed by: PHYSICIAN ASSISTANT

## 2018-01-01 PROCEDURE — 99232 SBSQ HOSP IP/OBS MODERATE 35: CPT | Mod: GC | Performed by: INTERNAL MEDICINE

## 2018-01-01 PROCEDURE — 25000131 ZZH RX MED GY IP 250 OP 636 PS 637: Performed by: INTERNAL MEDICINE

## 2018-01-01 PROCEDURE — 80053 COMPREHEN METABOLIC PANEL: CPT | Performed by: PHYSICIAN ASSISTANT

## 2018-01-01 PROCEDURE — 40000275 ZZH STATISTIC RCP TIME EA 10 MIN

## 2018-01-01 PROCEDURE — 83935 ASSAY OF URINE OSMOLALITY: CPT | Performed by: PHYSICIAN ASSISTANT

## 2018-01-01 PROCEDURE — 93306 TTE W/DOPPLER COMPLETE: CPT

## 2018-01-01 PROCEDURE — 99205 OFFICE O/P NEW HI 60 MIN: CPT | Mod: ZP | Performed by: INTERNAL MEDICINE

## 2018-01-01 PROCEDURE — 99223 1ST HOSP IP/OBS HIGH 75: CPT | Performed by: NURSE PRACTITIONER

## 2018-01-01 PROCEDURE — 96377 APPLICATON ON-BODY INJECTOR: CPT | Mod: 59

## 2018-01-01 PROCEDURE — 25800025 ZZH RX 258: Performed by: PHYSICIAN ASSISTANT

## 2018-01-01 PROCEDURE — 99215 OFFICE O/P EST HI 40 MIN: CPT | Mod: ZP | Performed by: INTERNAL MEDICINE

## 2018-01-01 PROCEDURE — 83735 ASSAY OF MAGNESIUM: CPT | Performed by: INTERNAL MEDICINE

## 2018-01-01 PROCEDURE — 83605 ASSAY OF LACTIC ACID: CPT

## 2018-01-01 PROCEDURE — 97535 SELF CARE MNGMENT TRAINING: CPT | Mod: GO

## 2018-01-01 PROCEDURE — G0463 HOSPITAL OUTPT CLINIC VISIT: HCPCS | Mod: ZF

## 2018-01-01 PROCEDURE — 88323 CONSLTJ&REPRT MATRL PREP SLD: CPT | Performed by: INTERNAL MEDICINE

## 2018-01-01 PROCEDURE — 88360 TUMOR IMMUNOHISTOCHEM/MANUAL: CPT | Performed by: INTERNAL MEDICINE

## 2018-01-01 PROCEDURE — 25000128 H RX IP 250 OP 636: Mod: ZF | Performed by: PHYSICIAN ASSISTANT

## 2018-01-01 PROCEDURE — 93010 ELECTROCARDIOGRAM REPORT: CPT | Performed by: INTERNAL MEDICINE

## 2018-01-01 PROCEDURE — 12000006 ZZH R&B IMCU INTERMEDIATE UMMC

## 2018-01-01 PROCEDURE — 86900 BLOOD TYPING SEROLOGIC ABO: CPT | Performed by: PHYSICIAN ASSISTANT

## 2018-01-01 PROCEDURE — 82565 ASSAY OF CREATININE: CPT

## 2018-01-01 PROCEDURE — G0463 HOSPITAL OUTPT CLINIC VISIT: HCPCS | Mod: 25

## 2018-01-01 PROCEDURE — 34300033 ZZH RX 343: Performed by: CLINICAL NURSE SPECIALIST

## 2018-01-01 PROCEDURE — 71260 CT THORAX DX C+: CPT

## 2018-01-01 PROCEDURE — 12000008 ZZH R&B INTERMEDIATE UMMC

## 2018-01-01 PROCEDURE — 40000133 ZZH STATISTIC OT WARD VISIT

## 2018-01-01 PROCEDURE — 96360 HYDRATION IV INFUSION INIT: CPT

## 2018-01-01 PROCEDURE — 85049 AUTOMATED PLATELET COUNT: CPT | Performed by: PHYSICIAN ASSISTANT

## 2018-01-01 PROCEDURE — 82565 ASSAY OF CREATININE: CPT | Performed by: PHYSICIAN ASSISTANT

## 2018-01-01 PROCEDURE — 93005 ELECTROCARDIOGRAM TRACING: CPT

## 2018-01-01 PROCEDURE — 99214 OFFICE O/P EST MOD 30 MIN: CPT | Mod: ZP | Performed by: PHYSICIAN ASSISTANT

## 2018-01-01 PROCEDURE — 25000128 H RX IP 250 OP 636: Performed by: CLINICAL NURSE SPECIALIST

## 2018-01-01 PROCEDURE — 00000346 ZZHCL STATISTIC REVIEW OUTSIDE SLIDES TC 88321: Performed by: INTERNAL MEDICINE

## 2018-01-01 PROCEDURE — 36592 COLLECT BLOOD FROM PICC: CPT | Performed by: INTERNAL MEDICINE

## 2018-01-01 PROCEDURE — 84295 ASSAY OF SERUM SODIUM: CPT | Performed by: PHYSICIAN ASSISTANT

## 2018-01-01 PROCEDURE — 86922 COMPATIBILITY TEST ANTIGLOB: CPT | Performed by: PHYSICIAN ASSISTANT

## 2018-01-01 PROCEDURE — 84100 ASSAY OF PHOSPHORUS: CPT | Performed by: INTERNAL MEDICINE

## 2018-01-01 PROCEDURE — 84300 ASSAY OF URINE SODIUM: CPT | Performed by: PHYSICIAN ASSISTANT

## 2018-01-01 PROCEDURE — A9552 F18 FDG: HCPCS | Performed by: CLINICAL NURSE SPECIALIST

## 2018-01-01 PROCEDURE — 83735 ASSAY OF MAGNESIUM: CPT | Performed by: PHYSICIAN ASSISTANT

## 2018-01-01 PROCEDURE — 86870 RBC ANTIBODY IDENTIFICATION: CPT | Performed by: PHYSICIAN ASSISTANT

## 2018-01-01 PROCEDURE — 25000132 ZZH RX MED GY IP 250 OP 250 PS 637: Mod: ZF | Performed by: INTERNAL MEDICINE

## 2018-01-01 PROCEDURE — 99223 1ST HOSP IP/OBS HIGH 75: CPT | Mod: AI | Performed by: INTERNAL MEDICINE

## 2018-01-01 PROCEDURE — 25000125 ZZHC RX 250: Performed by: INTERNAL MEDICINE

## 2018-01-01 PROCEDURE — 25000128 H RX IP 250 OP 636: Performed by: INTERNAL MEDICINE

## 2018-01-01 PROCEDURE — 84100 ASSAY OF PHOSPHORUS: CPT | Performed by: PHYSICIAN ASSISTANT

## 2018-01-01 PROCEDURE — 99207 ZZC CHGS TRANSFERRED TO HOSPITAL: CPT | Mod: ZP | Performed by: PHYSICIAN ASSISTANT

## 2018-01-01 PROCEDURE — 96417 CHEMO IV INFUS EACH ADDL SEQ: CPT

## 2018-01-01 PROCEDURE — 81001 URINALYSIS AUTO W/SCOPE: CPT | Performed by: PHYSICIAN ASSISTANT

## 2018-01-01 PROCEDURE — 36592 COLLECT BLOOD FROM PICC: CPT | Performed by: PHYSICIAN ASSISTANT

## 2018-01-01 PROCEDURE — 74019 RADEX ABDOMEN 2 VIEWS: CPT

## 2018-01-01 PROCEDURE — 36430 TRANSFUSION BLD/BLD COMPNT: CPT

## 2018-01-01 PROCEDURE — 99239 HOSP IP/OBS DSCHRG MGMT >30: CPT | Performed by: INTERNAL MEDICINE

## 2018-01-01 PROCEDURE — 80048 BASIC METABOLIC PNL TOTAL CA: CPT | Performed by: INTERNAL MEDICINE

## 2018-01-01 PROCEDURE — P9016 RBC LEUKOCYTES REDUCED: HCPCS | Performed by: PHYSICIAN ASSISTANT

## 2018-01-01 PROCEDURE — 99221 1ST HOSP IP/OBS SF/LOW 40: CPT | Performed by: INTERNAL MEDICINE

## 2018-01-01 PROCEDURE — 96375 TX/PRO/DX INJ NEW DRUG ADDON: CPT

## 2018-01-01 PROCEDURE — 99215 OFFICE O/P EST HI 40 MIN: CPT | Mod: ZP | Performed by: PHYSICIAN ASSISTANT

## 2018-01-01 PROCEDURE — 96361 HYDRATE IV INFUSION ADD-ON: CPT

## 2018-01-01 PROCEDURE — 36591 DRAW BLOOD OFF VENOUS DEVICE: CPT

## 2018-01-01 PROCEDURE — 82962 GLUCOSE BLOOD TEST: CPT

## 2018-01-01 PROCEDURE — 83930 ASSAY OF BLOOD OSMOLALITY: CPT | Performed by: PHYSICIAN ASSISTANT

## 2018-01-01 PROCEDURE — 96415 CHEMO IV INFUSION ADDL HR: CPT

## 2018-01-01 DEVICE — CATH PORT POWERPORT CLEARVUE SLIM 6FR 5616000: Type: IMPLANTABLE DEVICE | Site: CHEST  WALL | Status: FUNCTIONAL

## 2018-01-01 RX ORDER — METHYLPREDNISOLONE SODIUM SUCCINATE 125 MG/2ML
125 INJECTION, POWDER, LYOPHILIZED, FOR SOLUTION INTRAMUSCULAR; INTRAVENOUS
Status: CANCELLED
Start: 2018-01-01

## 2018-01-01 RX ORDER — ALBUTEROL SULFATE 90 UG/1
1-2 AEROSOL, METERED RESPIRATORY (INHALATION)
Status: CANCELLED
Start: 2018-01-01

## 2018-01-01 RX ORDER — ONDANSETRON 8 MG/1
8 TABLET, FILM COATED ORAL EVERY 8 HOURS PRN
Qty: 60 TABLET | Refills: 3 | Status: SHIPPED | OUTPATIENT
Start: 2018-01-01 | End: 2018-01-01

## 2018-01-01 RX ORDER — DIPHENHYDRAMINE HCL 25 MG
50 CAPSULE ORAL ONCE
Status: COMPLETED | OUTPATIENT
Start: 2018-01-01 | End: 2018-01-01

## 2018-01-01 RX ORDER — LIDOCAINE 40 MG/G
CREAM TOPICAL
Status: DISCONTINUED | OUTPATIENT
Start: 2018-01-01 | End: 2018-01-01 | Stop reason: HOSPADM

## 2018-01-01 RX ORDER — MIRTAZAPINE 7.5 MG/1
7.5 TABLET, FILM COATED ORAL AT BEDTIME
COMMUNITY
Start: 2018-01-01 | End: 2019-01-01

## 2018-01-01 RX ORDER — EPINEPHRINE 1 MG/ML
0.3 INJECTION, SOLUTION INTRAMUSCULAR; SUBCUTANEOUS EVERY 5 MIN PRN
Status: CANCELLED | OUTPATIENT
Start: 2018-01-01

## 2018-01-01 RX ORDER — ONDANSETRON 8 MG/1
4 TABLET, FILM COATED ORAL EVERY 8 HOURS PRN
COMMUNITY
Start: 2018-01-01

## 2018-01-01 RX ORDER — HEPARIN SODIUM (PORCINE) LOCK FLUSH IV SOLN 100 UNIT/ML 100 UNIT/ML
5 SOLUTION INTRAVENOUS ONCE
Status: COMPLETED | OUTPATIENT
Start: 2018-01-01 | End: 2018-01-01

## 2018-01-01 RX ORDER — ONDANSETRON 4 MG/1
4 TABLET, ORALLY DISINTEGRATING ORAL EVERY 30 MIN PRN
Status: DISCONTINUED | OUTPATIENT
Start: 2018-01-01 | End: 2018-01-01 | Stop reason: HOSPADM

## 2018-01-01 RX ORDER — METOPROLOL TARTRATE 1 MG/ML
1-2 INJECTION, SOLUTION INTRAVENOUS EVERY 5 MIN PRN
Status: DISCONTINUED | OUTPATIENT
Start: 2018-01-01 | End: 2018-01-01 | Stop reason: HOSPADM

## 2018-01-01 RX ORDER — SODIUM CHLORIDE 9 MG/ML
1000 INJECTION, SOLUTION INTRAVENOUS CONTINUOUS PRN
Status: CANCELLED
Start: 2018-01-01

## 2018-01-01 RX ORDER — DIPHENHYDRAMINE HCL 25 MG
50 CAPSULE ORAL ONCE
Status: CANCELLED
Start: 2018-01-01

## 2018-01-01 RX ORDER — FENTANYL CITRATE 50 UG/ML
25-50 INJECTION, SOLUTION INTRAMUSCULAR; INTRAVENOUS
Status: DISCONTINUED | OUTPATIENT
Start: 2018-01-01 | End: 2018-01-01 | Stop reason: HOSPADM

## 2018-01-01 RX ORDER — FAMOTIDINE 20 MG/1
20 TABLET, FILM COATED ORAL 2 TIMES DAILY PRN
COMMUNITY

## 2018-01-01 RX ORDER — ACETAMINOPHEN 325 MG/1
975 TABLET ORAL ONCE
Status: DISCONTINUED | OUTPATIENT
Start: 2018-01-01 | End: 2018-01-01 | Stop reason: HOSPADM

## 2018-01-01 RX ORDER — PROCHLORPERAZINE MALEATE 10 MG
10 TABLET ORAL EVERY 6 HOURS PRN
Qty: 30 TABLET | Refills: 2 | Status: SHIPPED | OUTPATIENT
Start: 2018-01-01 | End: 2018-01-01

## 2018-01-01 RX ORDER — SODIUM CHLORIDE, SODIUM LACTATE, POTASSIUM CHLORIDE, CALCIUM CHLORIDE 600; 310; 30; 20 MG/100ML; MG/100ML; MG/100ML; MG/100ML
INJECTION, SOLUTION INTRAVENOUS CONTINUOUS
Status: DISCONTINUED | OUTPATIENT
Start: 2018-01-01 | End: 2018-01-01 | Stop reason: HOSPADM

## 2018-01-01 RX ORDER — IOPAMIDOL 755 MG/ML
45-135 INJECTION, SOLUTION INTRAVASCULAR ONCE
Status: COMPLETED | OUTPATIENT
Start: 2018-01-01 | End: 2018-01-01

## 2018-01-01 RX ORDER — LORAZEPAM 0.5 MG/1
0.5 TABLET ORAL EVERY 4 HOURS PRN
Status: DISCONTINUED | OUTPATIENT
Start: 2018-01-01 | End: 2018-01-01

## 2018-01-01 RX ORDER — OXYCODONE AND ACETAMINOPHEN 5; 325 MG/1; MG/1
1 TABLET ORAL EVERY 6 HOURS PRN
Qty: 50 TABLET | Refills: 0 | Status: SHIPPED | OUTPATIENT
Start: 2018-01-01 | End: 2018-01-01

## 2018-01-01 RX ORDER — OXYCODONE AND ACETAMINOPHEN 5; 325 MG/1; MG/1
1-2 TABLET ORAL EVERY 4 HOURS PRN
Qty: 50 TABLET | Refills: 0 | Status: SHIPPED | OUTPATIENT
Start: 2018-01-01 | End: 2018-01-01

## 2018-01-01 RX ORDER — HEPARIN SODIUM (PORCINE) LOCK FLUSH IV SOLN 100 UNIT/ML 100 UNIT/ML
5 SOLUTION INTRAVENOUS EVERY 8 HOURS
Status: DISCONTINUED | OUTPATIENT
Start: 2018-01-01 | End: 2018-01-01 | Stop reason: HOSPADM

## 2018-01-01 RX ORDER — HEPARIN SODIUM (PORCINE) LOCK FLUSH IV SOLN 100 UNIT/ML 100 UNIT/ML
500 SOLUTION INTRAVENOUS ONCE
Status: DISCONTINUED | OUTPATIENT
Start: 2018-01-01 | End: 2018-01-01 | Stop reason: HOSPADM

## 2018-01-01 RX ORDER — EPINEPHRINE 0.3 MG/.3ML
0.3 INJECTION SUBCUTANEOUS EVERY 5 MIN PRN
Status: CANCELLED | OUTPATIENT
Start: 2018-01-01

## 2018-01-01 RX ORDER — LORATADINE 10 MG/1
10 TABLET ORAL DAILY
COMMUNITY
Start: 2018-01-01

## 2018-01-01 RX ORDER — ONDANSETRON 4 MG/1
4 TABLET, ORALLY DISINTEGRATING ORAL EVERY 6 HOURS PRN
Qty: 50 TABLET | Refills: 3 | Status: SHIPPED | OUTPATIENT
Start: 2018-01-01 | End: 2018-01-01

## 2018-01-01 RX ORDER — OXYCODONE HYDROCHLORIDE 5 MG/1
5 TABLET ORAL EVERY 4 HOURS PRN
Status: DISCONTINUED | OUTPATIENT
Start: 2018-01-01 | End: 2018-01-01 | Stop reason: HOSPADM

## 2018-01-01 RX ORDER — ONDANSETRON 2 MG/ML
4 INJECTION INTRAMUSCULAR; INTRAVENOUS EVERY 30 MIN PRN
Status: DISCONTINUED | OUTPATIENT
Start: 2018-01-01 | End: 2018-01-01 | Stop reason: HOSPADM

## 2018-01-01 RX ORDER — METOPROLOL TARTRATE 1 MG/ML
5 INJECTION, SOLUTION INTRAVENOUS ONCE
Status: COMPLETED | OUTPATIENT
Start: 2018-01-01 | End: 2018-01-01

## 2018-01-01 RX ORDER — OLANZAPINE 2.5 MG/1
2.5 TABLET, FILM COATED ORAL AT BEDTIME
Qty: 30 TABLET | Refills: 1 | Status: SHIPPED | OUTPATIENT
Start: 2018-01-01 | End: 2019-01-01

## 2018-01-01 RX ORDER — POTASSIUM CHLORIDE 1.5 G/1.58G
20-40 POWDER, FOR SOLUTION ORAL
Status: DISCONTINUED | OUTPATIENT
Start: 2018-01-01 | End: 2018-01-01 | Stop reason: HOSPADM

## 2018-01-01 RX ORDER — BISACODYL 10 MG
10 SUPPOSITORY, RECTAL RECTAL DAILY PRN
Status: DISCONTINUED | OUTPATIENT
Start: 2018-01-01 | End: 2018-01-01 | Stop reason: HOSPADM

## 2018-01-01 RX ORDER — HEPARIN SODIUM (PORCINE) LOCK FLUSH IV SOLN 100 UNIT/ML 100 UNIT/ML
500 SOLUTION INTRAVENOUS ONCE
Status: COMPLETED | OUTPATIENT
Start: 2018-01-01 | End: 2018-01-01

## 2018-01-01 RX ORDER — ACETAMINOPHEN 325 MG/1
650 TABLET ORAL EVERY 4 HOURS PRN
Status: DISCONTINUED | OUTPATIENT
Start: 2018-01-01 | End: 2018-01-01 | Stop reason: HOSPADM

## 2018-01-01 RX ORDER — GABAPENTIN 100 MG/1
100 CAPSULE ORAL ONCE
Status: DISCONTINUED | OUTPATIENT
Start: 2018-01-01 | End: 2018-01-01 | Stop reason: HOSPADM

## 2018-01-01 RX ORDER — METOPROLOL TARTRATE 25 MG/1
12.5 TABLET, FILM COATED ORAL 2 TIMES DAILY
Qty: 60 TABLET | Refills: 0 | Status: SHIPPED | OUTPATIENT
Start: 2018-01-01

## 2018-01-01 RX ORDER — SODIUM CHLORIDE 9 MG/ML
INJECTION, SOLUTION INTRAVENOUS CONTINUOUS
Status: DISCONTINUED | OUTPATIENT
Start: 2018-01-01 | End: 2018-01-01 | Stop reason: HOSPADM

## 2018-01-01 RX ORDER — DRONABINOL 2.5 MG/1
2.5 CAPSULE ORAL
Qty: 60 CAPSULE | Refills: 0 | Status: SHIPPED | OUTPATIENT
Start: 2018-01-01 | End: 2018-01-01

## 2018-01-01 RX ORDER — METOPROLOL SUCCINATE 25 MG/1
12.5 TABLET, EXTENDED RELEASE ORAL DAILY
Qty: 60 TABLET | Refills: 0 | Status: SHIPPED | OUTPATIENT
Start: 2018-01-01 | End: 2018-01-01

## 2018-01-01 RX ORDER — ACETAMINOPHEN 325 MG/1
650 TABLET ORAL EVERY 8 HOURS
Status: DISCONTINUED | OUTPATIENT
Start: 2018-01-01 | End: 2018-01-01 | Stop reason: HOSPADM

## 2018-01-01 RX ORDER — ONDANSETRON 4 MG/1
4 TABLET, FILM COATED ORAL EVERY 6 HOURS PRN
Status: DISCONTINUED | OUTPATIENT
Start: 2018-01-01 | End: 2018-01-01

## 2018-01-01 RX ORDER — PROPOFOL 10 MG/ML
INJECTION, EMULSION INTRAVENOUS CONTINUOUS PRN
Status: DISCONTINUED | OUTPATIENT
Start: 2018-01-01 | End: 2018-01-01

## 2018-01-01 RX ORDER — POTASSIUM CHLORIDE 7.45 MG/ML
10 INJECTION INTRAVENOUS
Status: DISCONTINUED | OUTPATIENT
Start: 2018-01-01 | End: 2018-01-01 | Stop reason: HOSPADM

## 2018-01-01 RX ORDER — MEPERIDINE HYDROCHLORIDE 25 MG/ML
12.5 INJECTION INTRAMUSCULAR; INTRAVENOUS; SUBCUTANEOUS
Status: DISCONTINUED | OUTPATIENT
Start: 2018-01-01 | End: 2018-01-01 | Stop reason: HOSPADM

## 2018-01-01 RX ORDER — ONDANSETRON 4 MG/1
4 TABLET, FILM COATED ORAL
COMMUNITY
Start: 2018-01-01 | End: 2018-01-01

## 2018-01-01 RX ORDER — CEFAZOLIN SODIUM 2 G/50ML
2 SOLUTION INTRAVENOUS
Status: COMPLETED | OUTPATIENT
Start: 2018-01-01 | End: 2018-01-01

## 2018-01-01 RX ORDER — POLYETHYLENE GLYCOL 3350 17 G/17G
17 POWDER, FOR SOLUTION ORAL 2 TIMES DAILY
Status: DISCONTINUED | OUTPATIENT
Start: 2018-01-01 | End: 2018-01-01 | Stop reason: HOSPADM

## 2018-01-01 RX ORDER — OXYCODONE AND ACETAMINOPHEN 5; 325 MG/1; MG/1
1-2 TABLET ORAL EVERY 4 HOURS PRN
Qty: 120 TABLET | Refills: 0 | Status: SHIPPED | OUTPATIENT
Start: 2018-01-01

## 2018-01-01 RX ORDER — HYDRALAZINE HYDROCHLORIDE 20 MG/ML
2.5-5 INJECTION INTRAMUSCULAR; INTRAVENOUS EVERY 10 MIN PRN
Status: DISCONTINUED | OUTPATIENT
Start: 2018-01-01 | End: 2018-01-01 | Stop reason: HOSPADM

## 2018-01-01 RX ORDER — ONDANSETRON 4 MG/1
4 TABLET, FILM COATED ORAL EVERY 8 HOURS PRN
Status: DISCONTINUED | OUTPATIENT
Start: 2018-01-01 | End: 2018-01-01

## 2018-01-01 RX ORDER — OXYCODONE HCL 10 MG/1
10 TABLET, FILM COATED, EXTENDED RELEASE ORAL EVERY 12 HOURS
Qty: 60 TABLET | Refills: 0 | Status: SHIPPED | OUTPATIENT
Start: 2018-01-01 | End: 2019-01-01

## 2018-01-01 RX ORDER — HYDRALAZINE HYDROCHLORIDE 25 MG/1
25 TABLET, FILM COATED ORAL EVERY 6 HOURS PRN
Status: DISCONTINUED | OUTPATIENT
Start: 2018-01-01 | End: 2018-01-01 | Stop reason: HOSPADM

## 2018-01-01 RX ORDER — SENNOSIDES 8.6 MG
1-2 TABLET ORAL 2 TIMES DAILY PRN
Status: DISCONTINUED | OUTPATIENT
Start: 2018-01-01 | End: 2018-01-01

## 2018-01-01 RX ORDER — FAMOTIDINE 20 MG/1
20 TABLET, FILM COATED ORAL
COMMUNITY
Start: 2018-01-01 | End: 2018-01-01

## 2018-01-01 RX ORDER — OXYCODONE HCL 10 MG/1
10 TABLET, FILM COATED, EXTENDED RELEASE ORAL EVERY 12 HOURS
Qty: 60 TABLET | Refills: 0 | Status: SHIPPED | OUTPATIENT
Start: 2018-01-01 | End: 2018-01-01

## 2018-01-01 RX ORDER — DEXAMETHASONE 4 MG/1
8 TABLET ORAL DAILY
Status: DISCONTINUED | OUTPATIENT
Start: 2018-01-01 | End: 2018-01-01 | Stop reason: HOSPADM

## 2018-01-01 RX ORDER — ONDANSETRON 2 MG/ML
4 INJECTION INTRAMUSCULAR; INTRAVENOUS EVERY 6 HOURS PRN
Status: DISCONTINUED | OUTPATIENT
Start: 2018-01-01 | End: 2018-01-01

## 2018-01-01 RX ORDER — LIDOCAINE 40 MG/G
CREAM TOPICAL
Status: CANCELLED | OUTPATIENT
Start: 2018-01-01

## 2018-01-01 RX ORDER — ONDANSETRON 8 MG/1
8 TABLET, ORALLY DISINTEGRATING ORAL EVERY 6 HOURS PRN
Status: DISCONTINUED | OUTPATIENT
Start: 2018-01-01 | End: 2018-01-01 | Stop reason: HOSPADM

## 2018-01-01 RX ORDER — HYDROMORPHONE HYDROCHLORIDE 1 MG/ML
.3-.5 INJECTION, SOLUTION INTRAMUSCULAR; INTRAVENOUS; SUBCUTANEOUS EVERY 10 MIN PRN
Status: DISCONTINUED | OUTPATIENT
Start: 2018-01-01 | End: 2018-01-01 | Stop reason: HOSPADM

## 2018-01-01 RX ORDER — POLYETHYLENE GLYCOL 3350 17 G/17G
1 POWDER, FOR SOLUTION ORAL DAILY
Qty: 7 PACKET | Refills: 3 | Status: SHIPPED | OUTPATIENT
Start: 2018-01-01

## 2018-01-01 RX ORDER — OXYCODONE HCL 5 MG/5 ML
5 SOLUTION, ORAL ORAL EVERY 4 HOURS PRN
Status: DISCONTINUED | OUTPATIENT
Start: 2018-01-01 | End: 2018-01-01 | Stop reason: HOSPADM

## 2018-01-01 RX ORDER — OXYCODONE HYDROCHLORIDE 5 MG/1
5 TABLET ORAL
COMMUNITY
Start: 2018-01-01 | End: 2018-01-01

## 2018-01-01 RX ORDER — OXYCODONE HCL 10 MG/1
10 TABLET, FILM COATED, EXTENDED RELEASE ORAL EVERY 12 HOURS
Status: DISCONTINUED | OUTPATIENT
Start: 2018-01-01 | End: 2018-01-01 | Stop reason: HOSPADM

## 2018-01-01 RX ORDER — AMOXICILLIN 250 MG
2-4 CAPSULE ORAL 2 TIMES DAILY
Status: DISCONTINUED | OUTPATIENT
Start: 2018-01-01 | End: 2018-01-01 | Stop reason: HOSPADM

## 2018-01-01 RX ORDER — POTASSIUM CHLORIDE 750 MG/1
20-40 TABLET, EXTENDED RELEASE ORAL
Status: DISCONTINUED | OUTPATIENT
Start: 2018-01-01 | End: 2018-01-01 | Stop reason: HOSPADM

## 2018-01-01 RX ORDER — LIDOCAINE HYDROCHLORIDE 20 MG/ML
INJECTION, SOLUTION INFILTRATION; PERINEURAL PRN
Status: DISCONTINUED | OUTPATIENT
Start: 2018-01-01 | End: 2018-01-01

## 2018-01-01 RX ORDER — NALOXONE HYDROCHLORIDE 0.4 MG/ML
.1-.4 INJECTION, SOLUTION INTRAMUSCULAR; INTRAVENOUS; SUBCUTANEOUS
Status: DISCONTINUED | OUTPATIENT
Start: 2018-01-01 | End: 2018-01-01 | Stop reason: HOSPADM

## 2018-01-01 RX ORDER — ONDANSETRON 8 MG/1
8 TABLET, ORALLY DISINTEGRATING ORAL 3 TIMES DAILY
Status: DISCONTINUED | OUTPATIENT
Start: 2018-01-01 | End: 2018-01-01

## 2018-01-01 RX ORDER — MEPERIDINE HYDROCHLORIDE 25 MG/ML
25 INJECTION INTRAMUSCULAR; INTRAVENOUS; SUBCUTANEOUS EVERY 30 MIN PRN
Status: CANCELLED | OUTPATIENT
Start: 2018-01-01

## 2018-01-01 RX ORDER — ACETAMINOPHEN 500 MG
500-1000 TABLET ORAL
COMMUNITY
Start: 2018-01-01 | End: 2018-01-01

## 2018-01-01 RX ORDER — LOSARTAN POTASSIUM 50 MG/1
TABLET ORAL
Refills: 11 | COMMUNITY
Start: 2018-01-01 | End: 2018-01-01

## 2018-01-01 RX ORDER — ONDANSETRON 8 MG/1
8 TABLET, FILM COATED ORAL EVERY 8 HOURS PRN
Qty: 10 TABLET | Refills: 2 | Status: SHIPPED | OUTPATIENT
Start: 2018-01-01 | End: 2018-01-01

## 2018-01-01 RX ORDER — LORAZEPAM 2 MG/ML
0.5 INJECTION INTRAMUSCULAR EVERY 4 HOURS PRN
Status: CANCELLED
Start: 2018-01-01

## 2018-01-01 RX ORDER — ALBUTEROL SULFATE 0.83 MG/ML
2.5 SOLUTION RESPIRATORY (INHALATION)
Status: CANCELLED | OUTPATIENT
Start: 2018-01-01

## 2018-01-01 RX ORDER — LORAZEPAM 0.5 MG/1
0.5 TABLET ORAL EVERY 4 HOURS PRN
Qty: 30 TABLET | Refills: 2 | Status: SHIPPED | OUTPATIENT
Start: 2018-01-01 | End: 2019-01-01

## 2018-01-01 RX ORDER — HEPARIN SODIUM (PORCINE) LOCK FLUSH IV SOLN 100 UNIT/ML 100 UNIT/ML
5 SOLUTION INTRAVENOUS
Status: DISCONTINUED | OUTPATIENT
Start: 2018-01-01 | End: 2018-01-01 | Stop reason: HOSPADM

## 2018-01-01 RX ORDER — OLANZAPINE 2.5 MG/1
2.5 TABLET, FILM COATED ORAL AT BEDTIME
Status: DISCONTINUED | OUTPATIENT
Start: 2018-01-01 | End: 2018-01-01 | Stop reason: HOSPADM

## 2018-01-01 RX ORDER — HEPARIN SODIUM,PORCINE 10 UNIT/ML
5 VIAL (ML) INTRAVENOUS EVERY 24 HOURS
Status: DISCONTINUED | OUTPATIENT
Start: 2018-01-01 | End: 2018-01-01 | Stop reason: HOSPADM

## 2018-01-01 RX ORDER — LOSARTAN POTASSIUM 25 MG/1
50 TABLET ORAL DAILY
Status: DISCONTINUED | OUTPATIENT
Start: 2018-01-01 | End: 2018-01-01 | Stop reason: HOSPADM

## 2018-01-01 RX ORDER — IOPAMIDOL 755 MG/ML
51 INJECTION, SOLUTION INTRAVASCULAR ONCE
Status: COMPLETED | OUTPATIENT
Start: 2018-01-01 | End: 2018-01-01

## 2018-01-01 RX ORDER — POTASSIUM CHLORIDE 29.8 MG/ML
20 INJECTION INTRAVENOUS
Status: DISCONTINUED | OUTPATIENT
Start: 2018-01-01 | End: 2018-01-01 | Stop reason: HOSPADM

## 2018-01-01 RX ORDER — DIPHENHYDRAMINE HYDROCHLORIDE 50 MG/ML
50 INJECTION INTRAMUSCULAR; INTRAVENOUS
Status: CANCELLED
Start: 2018-01-01

## 2018-01-01 RX ORDER — HEPARIN SODIUM,PORCINE 10 UNIT/ML
5-10 VIAL (ML) INTRAVENOUS EVERY 24 HOURS
Status: DISCONTINUED | OUTPATIENT
Start: 2018-01-01 | End: 2018-01-01 | Stop reason: HOSPADM

## 2018-01-01 RX ORDER — HEPARIN SODIUM,PORCINE 10 UNIT/ML
5-10 VIAL (ML) INTRAVENOUS
Status: DISCONTINUED | OUTPATIENT
Start: 2018-01-01 | End: 2018-01-01 | Stop reason: HOSPADM

## 2018-01-01 RX ORDER — PROCHLORPERAZINE 25 MG
12.5 SUPPOSITORY, RECTAL RECTAL EVERY 12 HOURS PRN
Status: DISCONTINUED | OUTPATIENT
Start: 2018-01-01 | End: 2018-01-01 | Stop reason: HOSPADM

## 2018-01-01 RX ORDER — AMOXICILLIN 250 MG
2-4 CAPSULE ORAL 2 TIMES DAILY
Qty: 60 TABLET | Refills: 1 | Status: SHIPPED | OUTPATIENT
Start: 2018-01-01

## 2018-01-01 RX ORDER — PANTOPRAZOLE SODIUM 40 MG/1
40 TABLET, DELAYED RELEASE ORAL 2 TIMES DAILY
COMMUNITY

## 2018-01-01 RX ORDER — AMOXICILLIN 250 MG
2 CAPSULE ORAL 2 TIMES DAILY
Qty: 120 TABLET | Refills: 1 | Status: SHIPPED | OUTPATIENT
Start: 2018-01-01 | End: 2018-01-01

## 2018-01-01 RX ORDER — MAGNESIUM CARB/ALUMINUM HYDROX 105-160MG
296 TABLET,CHEWABLE ORAL
Status: DISCONTINUED | OUTPATIENT
Start: 2018-01-01 | End: 2018-01-01 | Stop reason: HOSPADM

## 2018-01-01 RX ORDER — POTASSIUM CL/LIDO/0.9 % NACL 10MEQ/0.1L
10 INTRAVENOUS SOLUTION, PIGGYBACK (ML) INTRAVENOUS
Status: DISCONTINUED | OUTPATIENT
Start: 2018-01-01 | End: 2018-01-01 | Stop reason: HOSPADM

## 2018-01-01 RX ORDER — HEPARIN SODIUM,PORCINE 10 UNIT/ML
5 VIAL (ML) INTRAVENOUS ONCE
Status: COMPLETED | OUTPATIENT
Start: 2018-01-01 | End: 2018-01-01

## 2018-01-01 RX ORDER — SODIUM CHLORIDE 9 MG/ML
INJECTION, SOLUTION INTRAVENOUS CONTINUOUS
Status: CANCELLED | OUTPATIENT
Start: 2018-01-01

## 2018-01-01 RX ORDER — MAGNESIUM SULFATE HEPTAHYDRATE 40 MG/ML
4 INJECTION, SOLUTION INTRAVENOUS EVERY 4 HOURS PRN
Status: DISCONTINUED | OUTPATIENT
Start: 2018-01-01 | End: 2018-01-01 | Stop reason: HOSPADM

## 2018-01-01 RX ORDER — PROCHLORPERAZINE MALEATE 5 MG
5 TABLET ORAL EVERY 6 HOURS PRN
Status: DISCONTINUED | OUTPATIENT
Start: 2018-01-01 | End: 2018-01-01 | Stop reason: HOSPADM

## 2018-01-01 RX ORDER — POLYETHYLENE GLYCOL 3350 17 G/17G
17 POWDER, FOR SOLUTION ORAL DAILY PRN
Status: DISCONTINUED | OUTPATIENT
Start: 2018-01-01 | End: 2018-01-01

## 2018-01-01 RX ORDER — DEXAMETHASONE 4 MG/1
4 TABLET ORAL DAILY
Qty: 30 TABLET | Refills: 1 | Status: SHIPPED | OUTPATIENT
Start: 2018-01-01 | End: 2019-01-01

## 2018-01-01 RX ADMIN — OXYCODONE HYDROCHLORIDE 5 MG: 5 TABLET ORAL at 21:58

## 2018-01-01 RX ADMIN — SODIUM CHLORIDE, PRESERVATIVE FREE 5 ML: 5 INJECTION INTRAVENOUS at 14:43

## 2018-01-01 RX ADMIN — HEPARIN SODIUM (PORCINE) LOCK FLUSH IV SOLN 100 UNIT/ML 5 ML: 100 SOLUTION at 12:17

## 2018-01-01 RX ADMIN — SODIUM CHLORIDE, PRESERVATIVE FREE 5 ML: 5 INJECTION INTRAVENOUS at 14:58

## 2018-01-01 RX ADMIN — ACETAMINOPHEN 650 MG: 325 TABLET, FILM COATED ORAL at 18:02

## 2018-01-01 RX ADMIN — PROPOFOL 75 MCG/KG/MIN: 10 INJECTION, EMULSION INTRAVENOUS at 09:56

## 2018-01-01 RX ADMIN — Medication 12.5 MG: at 01:45

## 2018-01-01 RX ADMIN — PACLITAXEL 314 MG: 6 INJECTION, SOLUTION INTRAVENOUS at 10:53

## 2018-01-01 RX ADMIN — CARBOPLATIN 395 MG: 10 INJECTION, SOLUTION INTRAVENOUS at 14:17

## 2018-01-01 RX ADMIN — HYDRALAZINE HYDROCHLORIDE 25 MG: 25 TABLET ORAL at 18:12

## 2018-01-01 RX ADMIN — Medication 12.5 MG: at 08:04

## 2018-01-01 RX ADMIN — SODIUM CHLORIDE, PRESERVATIVE FREE 5 ML: 5 INJECTION INTRAVENOUS at 09:03

## 2018-01-01 RX ADMIN — OXYCODONE HYDROCHLORIDE 10 MG: 10 TABLET, FILM COATED, EXTENDED RELEASE ORAL at 09:56

## 2018-01-01 RX ADMIN — DEXTROSE AND SODIUM CHLORIDE: 5; 450 INJECTION, SOLUTION INTRAVENOUS at 17:14

## 2018-01-01 RX ADMIN — SENNOSIDES AND DOCUSATE SODIUM 4 TABLET: 8.6; 5 TABLET ORAL at 20:34

## 2018-01-01 RX ADMIN — DEXAMETHASONE 8 MG: 2 TABLET ORAL at 08:39

## 2018-01-01 RX ADMIN — Medication 12.5 MG: at 20:25

## 2018-01-01 RX ADMIN — OXYCODONE HYDROCHLORIDE 5 MG: 5 TABLET ORAL at 13:33

## 2018-01-01 RX ADMIN — SODIUM CHLORIDE 1000 ML: 9 INJECTION, SOLUTION INTRAVENOUS at 12:28

## 2018-01-01 RX ADMIN — IOPAMIDOL 77 ML: 755 INJECTION, SOLUTION INTRAVENOUS at 07:50

## 2018-01-01 RX ADMIN — OXYCODONE HYDROCHLORIDE 10 MG: 10 TABLET, FILM COATED, EXTENDED RELEASE ORAL at 08:40

## 2018-01-01 RX ADMIN — DEXTROSE AND SODIUM CHLORIDE: 5; 450 INJECTION, SOLUTION INTRAVENOUS at 04:55

## 2018-01-01 RX ADMIN — OXYCODONE HYDROCHLORIDE 10 MG: 10 TABLET, FILM COATED, EXTENDED RELEASE ORAL at 08:03

## 2018-01-01 RX ADMIN — ONDANSETRON 8 MG: 4 TABLET, ORALLY DISINTEGRATING ORAL at 20:26

## 2018-01-01 RX ADMIN — Medication 10 ML: at 10:21

## 2018-01-01 RX ADMIN — OXYCODONE HYDROCHLORIDE 10 MG: 10 TABLET, FILM COATED, EXTENDED RELEASE ORAL at 20:34

## 2018-01-01 RX ADMIN — OXYCODONE HYDROCHLORIDE 10 MG: 10 TABLET, FILM COATED, EXTENDED RELEASE ORAL at 22:02

## 2018-01-01 RX ADMIN — Medication 5 ML: at 05:08

## 2018-01-01 RX ADMIN — CEFAZOLIN SODIUM 2 G: 2 SOLUTION INTRAVENOUS at 10:00

## 2018-01-01 RX ADMIN — SODIUM CHLORIDE 1000 ML: 9 INJECTION, SOLUTION INTRAVENOUS at 10:06

## 2018-01-01 RX ADMIN — Medication 5 ML: at 06:03

## 2018-01-01 RX ADMIN — POLYETHYLENE GLYCOL 3350 17 G: 17 POWDER, FOR SOLUTION ORAL at 09:55

## 2018-01-01 RX ADMIN — SENNOSIDES AND DOCUSATE SODIUM 2 TABLET: 8.6; 5 TABLET ORAL at 09:54

## 2018-01-01 RX ADMIN — POLYETHYLENE GLYCOL 3350 17 G: 17 POWDER, FOR SOLUTION ORAL at 20:26

## 2018-01-01 RX ADMIN — SENNOSIDES AND DOCUSATE SODIUM 4 TABLET: 8.6; 5 TABLET ORAL at 08:41

## 2018-01-01 RX ADMIN — OXYCODONE HYDROCHLORIDE 5 MG: 5 TABLET ORAL at 01:45

## 2018-01-01 RX ADMIN — SODIUM CHLORIDE, PRESERVATIVE FREE 5 ML: 5 INJECTION INTRAVENOUS at 12:25

## 2018-01-01 RX ADMIN — ACETAMINOPHEN 650 MG: 325 TABLET, FILM COATED ORAL at 17:13

## 2018-01-01 RX ADMIN — POLYETHYLENE GLYCOL 3350 17 G: 17 POWDER, FOR SOLUTION ORAL at 08:41

## 2018-01-01 RX ADMIN — ACETAMINOPHEN 650 MG: 325 TABLET, FILM COATED ORAL at 05:07

## 2018-01-01 RX ADMIN — LOSARTAN POTASSIUM 50 MG: 50 TABLET ORAL at 09:56

## 2018-01-01 RX ADMIN — LIDOCAINE HYDROCHLORIDE 50 MG: 20 INJECTION, SOLUTION INFILTRATION; PERINEURAL at 09:56

## 2018-01-01 RX ADMIN — PEGFILGRASTIM 6 MG: KIT SUBCUTANEOUS at 14:24

## 2018-01-01 RX ADMIN — SODIUM CHLORIDE 1000 ML: 9 INJECTION, SOLUTION INTRAVENOUS at 10:54

## 2018-01-01 RX ADMIN — LORAZEPAM 0.5 MG: 0.5 TABLET ORAL at 00:12

## 2018-01-01 RX ADMIN — OXYCODONE HYDROCHLORIDE 10 MG: 10 TABLET, FILM COATED, EXTENDED RELEASE ORAL at 20:25

## 2018-01-01 RX ADMIN — ENOXAPARIN SODIUM 40 MG: 40 INJECTION SUBCUTANEOUS at 15:39

## 2018-01-01 RX ADMIN — Medication 5 ML: at 16:29

## 2018-01-01 RX ADMIN — DEXTROSE AND SODIUM CHLORIDE: 5; 450 INJECTION, SOLUTION INTRAVENOUS at 18:53

## 2018-01-01 RX ADMIN — HEPARIN, PORCINE (PF) 10 UNIT/ML INTRAVENOUS SYRINGE 5 ML: at 15:55

## 2018-01-01 RX ADMIN — SODIUM CHLORIDE, PRESERVATIVE FREE 5 ML: 5 INJECTION INTRAVENOUS at 09:10

## 2018-01-01 RX ADMIN — DEXAMETHASONE 8 MG: 2 TABLET ORAL at 09:56

## 2018-01-01 RX ADMIN — SODIUM CHLORIDE 1000 ML: 9 INJECTION, SOLUTION INTRAVENOUS at 17:14

## 2018-01-01 RX ADMIN — DEXAMETHASONE 8 MG: 2 TABLET ORAL at 13:33

## 2018-01-01 RX ADMIN — ENOXAPARIN SODIUM 40 MG: 40 INJECTION SUBCUTANEOUS at 16:28

## 2018-01-01 RX ADMIN — OLANZAPINE 2.5 MG: 2.5 TABLET, FILM COATED ORAL at 22:51

## 2018-01-01 RX ADMIN — SODIUM CHLORIDE 1000 ML: 9 INJECTION, SOLUTION INTRAVENOUS at 14:21

## 2018-01-01 RX ADMIN — IOPAMIDOL 51 ML: 755 INJECTION, SOLUTION INTRAVENOUS at 18:21

## 2018-01-01 RX ADMIN — ACETAMINOPHEN 650 MG: 325 TABLET, FILM COATED ORAL at 23:45

## 2018-01-01 RX ADMIN — ACETAMINOPHEN 650 MG: 325 TABLET, FILM COATED ORAL at 08:04

## 2018-01-01 RX ADMIN — Medication 12.5 MG: at 09:56

## 2018-01-01 RX ADMIN — ACETAMINOPHEN 650 MG: 325 TABLET, FILM COATED ORAL at 15:45

## 2018-01-01 RX ADMIN — FAMOTIDINE 20 MG: 20 INJECTION, SOLUTION INTRAVENOUS at 10:12

## 2018-01-01 RX ADMIN — Medication 500 UNITS: at 12:07

## 2018-01-01 RX ADMIN — METOPROLOL TARTRATE 5 MG: 1 INJECTION, SOLUTION INTRAVENOUS at 18:57

## 2018-01-01 RX ADMIN — FLUDEOXYGLUCOSE F-18 10.08 MCI.: 500 INJECTION, SOLUTION INTRAVENOUS at 06:45

## 2018-01-01 RX ADMIN — DIPHENHYDRAMINE HYDROCHLORIDE 50 MG: 25 CAPSULE ORAL at 10:12

## 2018-01-01 RX ADMIN — POTASSIUM PHOSPHATE, MONOBASIC AND POTASSIUM PHOSPHATE, DIBASIC 15 MMOL: 224; 236 INJECTION, SOLUTION INTRAVENOUS at 09:54

## 2018-01-01 RX ADMIN — SODIUM CHLORIDE, PRESERVATIVE FREE 5 ML: 5 INJECTION INTRAVENOUS at 09:21

## 2018-01-01 RX ADMIN — OLANZAPINE 2.5 MG: 2.5 TABLET, FILM COATED ORAL at 21:58

## 2018-01-01 RX ADMIN — HYDRALAZINE HYDROCHLORIDE 25 MG: 25 TABLET ORAL at 12:17

## 2018-01-01 RX ADMIN — MAGNESIUM SULFATE HEPTAHYDRATE 4 G: 40 INJECTION, SOLUTION INTRAVENOUS at 06:31

## 2018-01-01 RX ADMIN — POLYETHYLENE GLYCOL 3350 17 G: 17 POWDER, FOR SOLUTION ORAL at 20:34

## 2018-01-01 RX ADMIN — Medication 5 ML: at 12:58

## 2018-01-01 RX ADMIN — Medication 12.5 MG: at 20:34

## 2018-01-01 RX ADMIN — ONDANSETRON 8 MG: 4 TABLET, ORALLY DISINTEGRATING ORAL at 13:33

## 2018-01-01 RX ADMIN — SENNOSIDES AND DOCUSATE SODIUM 2 TABLET: 8.6; 5 TABLET ORAL at 20:24

## 2018-01-01 RX ADMIN — DEXAMETHASONE SODIUM PHOSPHATE: 10 INJECTION, SOLUTION INTRAMUSCULAR; INTRAVENOUS at 10:24

## 2018-01-01 RX ADMIN — ONDANSETRON 8 MG: 4 TABLET, ORALLY DISINTEGRATING ORAL at 14:02

## 2018-01-01 RX ADMIN — ACETAMINOPHEN 650 MG: 325 TABLET, FILM COATED ORAL at 09:55

## 2018-01-01 RX ADMIN — ACETAMINOPHEN 650 MG: 325 TABLET, FILM COATED ORAL at 12:17

## 2018-01-01 RX ADMIN — ACETAMINOPHEN 650 MG: 325 TABLET, FILM COATED ORAL at 02:52

## 2018-01-01 RX ADMIN — LOSARTAN POTASSIUM 50 MG: 50 TABLET ORAL at 13:33

## 2018-01-01 RX ADMIN — ACETAMINOPHEN 650 MG: 325 TABLET, FILM COATED ORAL at 03:44

## 2018-01-01 RX ADMIN — Medication 5 ML: at 14:22

## 2018-01-01 RX ADMIN — SODIUM CHLORIDE, SODIUM LACTATE, POTASSIUM CHLORIDE, CALCIUM CHLORIDE: 600; 310; 30; 20 INJECTION, SOLUTION INTRAVENOUS at 09:49

## 2018-01-01 RX ADMIN — LOSARTAN POTASSIUM 50 MG: 50 TABLET ORAL at 08:40

## 2018-01-01 RX ADMIN — ENOXAPARIN SODIUM 40 MG: 40 INJECTION SUBCUTANEOUS at 17:13

## 2018-01-01 ASSESSMENT — ACTIVITIES OF DAILY LIVING (ADL)
ADLS_ACUITY_SCORE: 11
ADLS_ACUITY_SCORE: 15
ADLS_ACUITY_SCORE: 11
PREVIOUS_RESPONSIBILITIES: MEDICATION MANAGEMENT;SHOPPING

## 2018-01-01 ASSESSMENT — MIFFLIN-ST. JEOR
SCORE: 967.58
SCORE: 975.29
SCORE: 1010.21
SCORE: 942.63
SCORE: 973.03
SCORE: 934.47
SCORE: 989.36
SCORE: 934.47
SCORE: 1008.85
SCORE: 971.67
SCORE: 958.51

## 2018-01-01 ASSESSMENT — PAIN SCALES - GENERAL
PAINLEVEL: MODERATE PAIN (5)
PAINLEVEL: NO PAIN (0)
PAINLEVEL: MODERATE PAIN (4)
PAINLEVEL: NO PAIN (0)
PAINLEVEL: MILD PAIN (2)
PAINLEVEL: MILD PAIN (2)

## 2018-01-01 ASSESSMENT — PAIN DESCRIPTION - DESCRIPTORS
DESCRIPTORS: ACHING
DESCRIPTORS: CRAMPING
DESCRIPTORS: CRAMPING
DESCRIPTORS: ACHING;DISCOMFORT
DESCRIPTORS: ACHING;CONSTANT
DESCRIPTORS: ACHING;CRAMPING

## 2018-11-05 NOTE — TELEPHONE ENCOUNTER
----- Message from DEVIN Rick sent at 11/5/2018  7:25 AM CST -----  Regarding: RE: [facility], clinic and testing, next week  Hello,    Can I get an update on who is working on the appointments for this week? There is no Pre-Visit note with info.    Thanks  H  ----- Message -----     From: Zo Vanessa APRN CNS     Sent: 11/2/2018  10:22 AM       To: DEVIN Rick, #  Subject: [facility], clinic and testing, next week           Hey,    She needs to see Dr. Graham in clinic next week with a cardiopulmonary stress test, PFTs and PAC prior please.    Patient can be reached at [phone]    Dx: mediastinal mass  Referring: Dr. Reaves    Thanks  H

## 2018-11-05 NOTE — TELEPHONE ENCOUNTER
Date of appointment: 11/14   Diagnosis/reason for appointment: mediastinal mass  Referring provider/facility: [facility]   Who called:    Recent Studies  Imaging:  Pathology:  Labs:  Previous chemo/radiation (if known):    Records to be requested from: [facility]  Records received from:    Additional information:  Dx: mediastinal mass : Caller intake: Ref by Jean Paul: Bx done at [facility] Scans at in Epic: Records in Epic No out side records

## 2018-11-05 NOTE — TELEPHONE ENCOUNTER
----- Message from DEVIN Rick sent at 11/5/2018  7:25 AM CST -----  Regarding: RE: [facility], clinic and testing, next week  Hello,    Can I get an update on who is working on the appointments for this week? There is no Pre-Visit note with info.    Thanks  H  ----- Message -----     From: Zo Vanessa APRN CNS     Sent: 11/2/2018  10:22 AM       To: DEVIN Rick, #  Subject: [facility], clinic and testing, next week           Hey,    She needs to see Dr. Graham in clinic next week with a cardiopulmonary stress test, PFTs and PAC prior please.    Patient can be reached at [phone]    Dx: mediastinal mass  Referring: Dr. Reaves    Thanks  H

## 2018-11-06 NOTE — TELEPHONE ENCOUNTER
Date of appointment:11/07/18    Diagnosis/reason for appointment:Mediastinal mass  Referring provider/facility:Dr Reaves  Who called:    Recent Studies  Imaging:  Pathology:  Labs:  Previous chemo/radiation (if known):    Records requested from:  Records received from:    Additional information:Images and records in Epic

## 2018-11-06 NOTE — PROGRESS NOTES
THORACIC SURGERY - ESTABLISHED PATIENT OFFICE VISIT      Dear Dr. Reaves,    I saw Ms. 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     HPI  Ms. 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 She has weight loss of 20-25lbs over the past few months.  She has had progressive exertional dyspnea, palpitations and LEFT shoulder pain.      Previsit Tests   MRI brain (10/31/18): negative  MRI heart (10/30/18): no evidence of intrapericardial or myocardial invasion  CT guided biopsy (11/1/2018): squamous cell carcinoma (by report, slides requested)  CT chest 10/24/18: 7.5 cm LEFT hilar and mediastinal mass, can not exclude involvement of LEFT upper lobe.  Elevated LEFT hemidiaphragm. Small pericardial effusion    PFT: FEV1 0.73L; 38%; DLCO 65%    PMH  Hypertension  Miscarriage x 2    ETOH rare  TOB 40 pack years, current smoker    Physical examination  BMI 23  Non contributory    Noncontibutory    From a personal perspective, she is here with her son Shamir.  She is a retired .      IMPRESSION (C34.12) 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)  (primary encounter diagnosis)    78 year old female with a squamous cell cancer of left lung  LEFT hemidiaphragm paralysis  Small pericardial effusion    PLAN  I spent a total of 25 minutes with Ms. Deluca and Shamir, more than 50% of which were spent in counseling, coordination of care, and face-to-face time. I reviewed the plan as follows:  Unfortunately, due to local invasion of her squamous cell cancer with pericardial effusion, surgery is not an option for curative intent.  We will refer patient to oncology to discuss chemo, +/- radiation following PET scan.  1) PET scan for staging:  I am concerned about her shoulder pain, but it could possibly be related to phrenic invasion as well  2)  Obtain reports for brain and cardiac MRI from [facility]  3) Pathology review requested  4) Oncology referral: Ms. Deluca has locally advanced disease and poor lung function. Additionally, I am concerned that her small pericardial effusion is a sign of more advanced disease. I explained that if all she has on PET is localized disease, then she could potentially have chemotherapy and radiation therapy with curative intent; however, if she has metastatic disease on PET she would just be able to have chemotherapy with palliative intent. Ms. Deluca expressed interest in pursuing her oncologic care at [facility], but I gave her the option of wither [facility] or [facility].    They had all their questions answered and were in agreement with the plan.  I appreciate the opportunity to participate in the care of your patient and will keep you updated.  Sincerely,

## 2018-11-07 NOTE — Clinical Note
Hey, She needs to see Medical Oncology with a PET prior please.  She is in room 14 waiting to schedule. Thanks H

## 2018-11-07 NOTE — MR AVS SNAPSHOT
After Visit Summary   11/7/2018    Barbie Deluca    MRN: [id number]           Patient Information     Date Of Birth          1940        Visit Information        Provider Department      11/7/2018 2:30 PM Hesham Graham MD [facility]        Today's Diagnoses     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)    -  1       Follow-ups after your visit        Additional Services     ONC/HEME ADULT REFERRAL       Your provider has referred you to: [facility]: Cancer Care/Hematology (All Cancer Related Services) - [place] [phone]   [email or web address]    Please be aware that coverage of these services is subject to the terms and limitations of your health insurance plan.  Call member services at your health plan with any benefit or coverage questions.      Please bring the following with you to your appointment:    (1) Any X-Rays, CTs or MRIs which have been performed.  Contact the facility where they were done to arrange for  prior to your scheduled appointment.   (2) List of current medications  (3) This referral request   (4) Any documents/labs given to you for this referral                  Your next 10 appointments already scheduled     Nov 07, 2018  2:30 PM CST   (Arrive by 2:15 PM)   New Patient Visit with Hesham Graham MD   [facility] ([facility])    [address]  [address] [place] [address] [phone]            Nov 12, 2018  2:30 PM CST   (Arrive by 2:15 PM)   PAC EVALUATION with DEVIN Burgess [facility] ([facility] [place])    [address]  4th Floor  [facility] [address] [phone]            Nov 13, 2018  6:45 AM CST   (Arrive by 6:15 AM)   PE NPET ONCOLOGY (EYES TO THIGHS) with UUPET1   [facility] PET CT ([facility]  [place])    [address] [id number] [phone]           How do I prepare for my exam? (Food and drink instructions) 6 hours before your scan, stop all food and liquids (except water). Do not chew gum or suck on mints. If you need to take medicine with food, you may take it with a few crackers.  How do I prepare for my exam? (Other instructions) If you have diabetes: Have your exam early in the morning. Your blood glucose will go up as the day goes by. Your glucose level must be 180 or less at the start of the exam. Please take any oral diabetic medication you need to ensure this blood glucose level is below 180, but no insulin 4 hours prior to the exam.  * If you are taking insulin in the morning take with breakfast by 6 am and schedule procedure between 12 and 2:15 pm. * If you are taking insulin at night take nightly dose, fast overnight, schedule procedure before 10 am. * If you take insulin both morning and night take morning dose by 6am and schedule procedure between 12 and 2:15 pm.  24 hours before your scan, don t do any heavy exercise. (No jogging, aerobics or other workouts.) Exercise will make your pictures less accurate.  What should I wear? Wear loose fitting clothing to your exam.  How long does the exam take?  Most scans will take between 2-3 hours.  What should I bring: Please bring a list of your medicines (including vitamins, minerals and over-the-counter drugs). Leave your valuables at home.  Do I need a :  No  is needed.  What should I do after the exam: No restrictions, You may resume normal activities.  What is this test: Your doctor has ordered a PET scan (positron emission tomography). This will take pictures of the cells and organs in your body. Your doctor may have also ordered a CT scan (computed tomography). This is another way to take pictures of your cells and organs. It is done at the same time as the PET scan. The  "pictures will help us understand your problem so we can make a treatment plan that fits your needs.  Who should I call with questions: Please call your Imaging Department at your exam site with any questions. Directions, parking instructions, and other information is available on our website, [email or web address].            Nov 13, 2018 10:00 AM CST   (Arrive by 9:45 AM)   New Patient Visit with Tg Solares MD   [facility] ([facility] [place])    [address] [place] [address]  [address]  [facility] [address] [phone]              Future tests that were ordered for you today     Open Future Orders        Priority Expected Expires Ordered    NPET Oncology (Eyes to Thighs) Routine  2/5/2019 11/7/2018            Who to contact     If you have questions or need follow up information about today's clinic visit or your schedule please contact [facility] directly at [phone].  Normal or non-critical lab and imaging results will be communicated to you by MyChart, letter or phone within 4 business days after the clinic has received the results. If you do not hear from us within 7 days, please contact the clinic through [employer] or phone. If you have a critical or abnormal lab result, we will notify you by phone as soon as possible.  Submit refill requests through [employer] or call your pharmacy and they will forward the refill request to us. Please allow 3 business days for your refill to be completed.          Additional Information About Your Visit        [employer] Information     [employer] lets you send messages to your doctor, view your test results, renew your prescriptions, schedule appointments and more. To sign up, go to [email or web address] . Click on \"Log in\" on the left side of the screen, which will take you to the Welcome page. Then click on \"Sign up Now\" on the right side of the page.     You will be asked to enter the access code listed below, as well " "as some personal information. Please follow the directions to create your username and password.     Your access code is: [email or web address]  Expires: 2/3/2019  2:45 PM     Your access code will  in 90 days. If you need help or a new code, please call your [facility] clinic or [phone].        Care EveryWhere ID     This is your Care EveryWhere ID. This could be used by other organizations to access your [facility] medical records  [id number]        Your Vitals Were     Pulse Temperature Respirations Height Pulse Oximetry BMI (Body Mass Index)    104 97.7  F (36.5  C) (Oral) 16 1.57 m (5' 1.81\") 97% 23.13 kg/m2       Blood Pressure from Last 3 Encounters:   18 180/82    Weight from Last 3 Encounters:   18 57 kg (125 lb 11.2 oz)              We Performed the Following     ONC/HEME ADULT REFERRAL        Primary Care Provider Office Phone # Fax #    Erik Reaves [phone]       [facility] [employer] [address] [place] [address] DR LOGAN PERALTA [facility] [address]        Equal Access to Services     [facility]: Hadii aad ku hadasho Soomaali, waaxda luqadaha, qaybta kaalmada adeaylayada, gita guevara . So [facility] [phone].    ATENCIÓN: Si habla español, tiene a alarcon disposición servicios gratuitos de asistencia lingüística. [facility] [phone].    We comply with applicable federal civil rights laws and [place] laws. We do not discriminate on the basis of race, color, national origin, age, disability, sex, sexual orientation, or gender identity.            Thank you!     Thank you for choosing [facility]  for your care. Our goal is always to provide you with excellent care. Hearing back from our patients is one way we can continue to improve our services. Please take a few minutes to complete the written survey that you may receive in the mail after your visit with us. Thank you!             Your Updated Medication List - Protect others around you: " Learn how to safely use, store and throw away your medicines at www.disposemymeds.org.          This list is accurate as of 11/7/18 12:41 PM.  Always use your most recent med list.                   Brand Name Dispense Instructions for use Diagnosis    GAS-X PO      Take by mouth 4 times daily as needed for intestinal gas        losartan 50 MG tablet    COZAAR     TK 1 T PO D

## 2018-11-07 NOTE — PROGRESS NOTES
Barbie Deluca came to the Non-Invasive Cardiology lab for her Cardiopulmonary stress test ordered for a mediastinal mass.  The test was canceled by the supervising cardiologist Jacob Russell MD for elevated resting blood pressures of 169/106 (supine) and 172/108 (standing). The patient did not take her blood pressure medication this morning.  The patient was also unable to provide the name of the medication she was prescribed for treating her HTN. The patient's resting heart rates were between  bpm.  The SaO2 saturation while sitting down was 97%.  Patient has an appointment with Dr. Graham at 2:30PM today.

## 2018-11-07 NOTE — LETTER
11/7/2018      RE: Barbie Deluca  [address] [facility] [address]       THORACIC SURGERY - ESTABLISHED PATIENT OFFICE VISIT      Dear Dr. Reaves,    I saw Ms. 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     HPI  Ms. 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 She has weight loss of 20-25lbs over the past few months.  She has had progressive exertional dyspnea, palpitations and LEFT shoulder pain.      Previsit Tests   MRI brain (10/31/18): negative  MRI heart (10/30/18): no evidence of intrapericardial or myocardial invasion  CT guided biopsy (11/1/2018): squamous cell carcinoma (by report, slides requested)  CT chest 10/24/18: 7.5 cm LEFT hilar and mediastinal mass, can not exclude involvement of LEFT upper lobe.  Elevated LEFT hemidiaphragm. Small pericardial effusion    PFT: FEV1 0.73L; 38%; DLCO 65%    PMH  Hypertension  Miscarriage x 2    ETOH rare  TOB 40 pack years, current smoker    Physical examination  BMI 23  Non contributory    Noncontibutory    From a personal perspective, she is here with her son Shamir.  She is a retired .      IMPRESSION (C34.12) 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)  (primary encounter diagnosis)    78 year old female with a squamous cell cancer of left lung  LEFT hemidiaphragm paralysis  Small pericardial effusion    PLAN  I spent a total of 25 minutes with Ms. Deluca and Shamir, more than 50% of which were spent in counseling, coordination of care, and face-to-face time. I reviewed the plan as follows:  Unfortunately, due to local invasion of her squamous cell cancer with pericardial effusion, surgery is not an option for curative intent.  We will refer patient to oncology to discuss chemo, +/- radiation following PET scan.  1) PET scan for staging:  I am concerned  about her shoulder pain, but it could possibly be related to phrenic invasion as well  2) Obtain reports for brain and cardiac MRI from [facility]  3) Pathology review requested  4) Oncology referral: Ms. Deluca has locally advanced disease and poor lung function. Additionally, I am concerned that her small pericardial effusion is a sign of more advanced disease. I explained that if all she has on PET is localized disease, then she could potentially have chemotherapy and radiation therapy with curative intent; however, if she has metastatic disease on PET she would just be able to have chemotherapy with palliative intent. Ms. Deluca expressed interest in pursuing her oncologic care at [facility], [facility].    They had all their questions answered and were in agreement with the plan.  I appreciate the opportunity to participate in the care of your patient and will keep you updated.  Sincerely,      Hesham Graham MD

## 2018-11-07 NOTE — TELEPHONE ENCOUNTER
Date of appointment: 11/13/18   Diagnosis/reason for appointment:Mediastinal Mass  Referring provider/facility:[facility]  Who called:    Recent Studies  Imaging:  Pathology:  Labs:  Previous chemo/radiation (if known):    Records requested from:  Records received from:    Additional information:Records in Epic and CE. Images and Path coming from [facility] [place] [facility].

## 2018-11-09 NOTE — TELEPHONE ENCOUNTER
9 pathology slides and 1 report received from [facility] and sent to pathology.

## 2018-11-13 NOTE — MR AVS SNAPSHOT
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 We will need PD-L1 staining of the tissue.  2- RTC MD next week  3- Schedule for a port placement  5- Discontinue oxycodone  6- Start percocet 5/325 1-2 pills every 4 hours as needed          Follow-ups after your visit        Additional Services     CALL IT QUITS (QUITPLAN) REFERRAL                 Your next 10 appointments already scheduled     Nov 20, 2018  8:00 AM CST   (Arrive by 7:45 AM)   Return Visit with Tg Solares MD   [facility] ([facility])    [address]  [address]  [facility] [address] [phone]            Nov 20, 2018 10:30 AM CST   (Arrive by 9:00 AM)   IR CHEST PORT PLACEMENT > 5 YRS OF AGE with [id number]   [facility] ([facility])    [address]  5th Floor  [facility] [address] [phone]           The day before the exam:   You may eat your regular diet.   You are encouraged to drink at least 8 eight ounce glasses of clear liquids.  Please wear loose clothing, such as a sweat suit or jogging clothes. Avoid snaps, zippers and other metal. We may ask you to undress and put on a hospital gown.  Please bring any scans or X-rays taken at other hospitals, if similar tests were done. Also bring a list of your medicines, including vitamins, minerals and over-the-counter drugs. It is safest to leave personal items at home.  Someone will need to drive you to and from the hospital.  Tell your doctor in advance:   If you have allergies to x-ray  contrast or iodine.   If you are or may be pregnant.   If you are taking Coumadin (or any other blood thinners) 5 days prior to the exam for any special instructions.   If you are diabetic to determine if your insulin needs have to be adjusted for the exam.  Your doctor will:   Need to do a history and physical within 30 days before this procedure.   Obtain necessary laboratory tests prior to the exam (Basic Metabolic Panel, CBCP, PTT and INR)   (No labs needed if you are having a tunneled catheter exchange or removal)  If you were given sedation, you cannot drive for 24 hours after the procedure, and an adult must be with you until then.  If you have any questions, please call the Imaging Department where you will have your exam. Directions, parking instructions, and other information are available on our website, [email or web address].              Future tests that were ordered for you today     Open Future Orders        Priority Expected Expires Ordered    IR Chest Port Placement > 5 Yrs of Age Routine  11/13/2019 11/13/2018    PET Oncology Whole Body Routine  2/5/2019 11/7/2018            Who to contact     If you have questions or need follow up information about today's clinic visit or your schedule please contact [facility] directly at [phone].  Normal or non-critical lab and imaging results will be communicated to you by MyChart, letter or phone within 4 business days after the clinic has received the results. If you do not hear from us within 7 days, please contact the clinic through [employer] [facility] or phone. If you have a critical or abnormal lab result, we will notify you by phone as soon as possible.  Submit refill requests through [employer] or call your pharmacy and they will forward the refill request to us. Please allow 3 business days for your refill to be completed.          Additional Information About Your Visit        Snip[employer]ly Information     [employer]ly lets you send messages to your  "doctor, view your test results, renew your prescriptions, schedule appointments and more. To sign up, go to [email or web address] . Click on \"Log in\" on the left side of the screen, which will take you to the Welcome page. Then click on \"Sign up Now\" on the right side of the page.     You will be asked to enter the access code listed below, as well as some personal information. Please follow the directions to create your username and password.     Your access code is: [email or web address]  Expires: 2/3/2019  2:45 PM     Your access code will  in 90 days. If you need help or a new code, please call your [place] clinic or [phone].        Care EveryWhere ID     This is your Care EveryWhere ID. This could be used by other organizations to access your [place] medical records  [id number]        Your Vitals Were     Pulse Temperature Height Pulse Oximetry BMI (Body Mass Index)       96 97.7  F (36.5  C) (Oral) 1.575 m (5' 2\") 94% 22.78 kg/m2        Blood Pressure from Last 3 Encounters:   18 113/74   18 180/82    Weight from Last 3 Encounters:   18 56.5 kg (124 lb 9 oz)   18 57 kg (125 lb 11.2 oz)              We Performed the Following     CALL IT QUITS (QUITPLAN) REFERRAL     Pathology Consult          Today's Medication Changes          These changes are accurate as of 18 10:14 AM.  If you have any questions, ask your nurse or doctor.               Start taking these medicines.        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  Ask your nurse if you have questions.     Bring a paper " prescription for each of these medications     oxyCODONE-acetaminophen 5-325 MG per tablet               Information about OPIOIDS     PRESCRIPTION OPIOIDS: WHAT YOU NEED TO KNOW   We gave you an opioid (narcotic) pain medicine. It is important to manage your pain, but opioids are not always the best choice. You should first try all the other options your care team gave you. Take this medicine for as short a time (and as few doses) as possible.    Some activities can increase your pain, such as bandage changes or therapy sessions. It may help to take your pain medicine 30 to 60 minutes before these activities. Reduce your stress by getting enough sleep, working on hobbies you enjoy and practicing relaxation or meditation. Talk to your care team about ways to manage your pain beyond prescription opioids.    These medicines have risks:    DO NOT drive when on new or higher doses of pain medicine. These medicines can affect your alertness and reaction times, and you could be arrested for driving under the influence (DUI). If you need to use opioids long-term, talk to your care team about driving.    DO NOT operate heavy machinery    DO NOT do any other dangerous activities while taking these medicines.    DO NOT drink any alcohol while taking these medicines.     If the opioid prescribed includes acetaminophen, DO NOT take with any other medicines that contain acetaminophen. Read all labels carefully. Look for the word  acetaminophen  or  Tylenol.  Ask your pharmacist if you have questions or are unsure.    You can get addicted to pain medicines, especially if you have a history of addiction (chemical, alcohol or substance dependence). Talk to your care team about ways to reduce this risk.    All opioids tend to cause constipation. Drink plenty of water and eat foods that have a lot of fiber, such as fruits, vegetables, prune juice, apple juice and high-fiber cereal. Take a laxative (Miralax, milk of magnesia, Colace,  Senna) if you don t move your bowels at least every other day. Other side effects include upset stomach, sleepiness, dizziness, throwing up, tolerance (needing more of the medicine to have the same effect), physical dependence and slowed breathing.    Store your pills in a secure place, locked if possible. We will not replace any lost or stolen medicine. If you don t finish your medicine, please throw away (dispose) as directed by your pharmacist. The [place] Pollution Control Agency has more information about safe disposal: [email or web address]-[email or web address]         Primary Care Provider Office Phone # Fax #    Erik Reaves [phone]       [facility] FABIAN Catron [address] [place] [facility] [address]        Equal Access to Services     MARCY MÁRQUEZ : Elena ingram Sodasha, waaxda luqadaha, qaybta kaalmada jonnyashad, gita guevara . So [facility] [phone].    ATENCIÓN: Si habla español, tiene a alarcon disposición servicios gratuitos de asistencia lingüística. Llame al [phone].    We comply with applicable federal civil rights laws and [place] laws. We do not discriminate on the basis of race, color, national origin, age, disability, sex, sexual orientation, or gender identity.            Thank you!     Thank you for choosing [facility]  for your care. Our goal is always to provide you with excellent care. Hearing back from our patients is one way we can continue to improve our services. Please take a few minutes to complete the written survey that you may receive in the mail after your visit with us. Thank you!             Your Updated Medication List - Protect others around you: Learn how to safely use, store and throw away your medicines at www.disposemymeds.org.          This list is accurate as of 11/13/18 10:14 AM.  Always use your most recent med list.                   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

## 2018-11-13 NOTE — PATIENT INSTRUCTIONS
1- Please obtain biopsy tissue from [facility] form 11/1/18. We will need PD-L1 staining of the tissue.  2- RTC MD next week  3- Schedule for a port placement  5- Discontinue oxycodone  6- Start percocet 5/325 1-2 pills every 4 hours as needed

## 2018-11-13 NOTE — LETTER
11/13/2018       RE: Barbie Deluca  [address] [facility] [address]     Dear Colleague,    Thank you for referring your patient, Barbie Deluca, to the [facility]. Please see a copy of my visit note below.    This clinic visit note has been dictated [phone]              [facility]  HEMATOLOGY ONCOLOGY    Visit Date:   11/13/2018      REASON FOR CONSULTATION:  New diagnosis of squamous cell carcinoma.      REFERRING PROVIDER:  Hesham Graham MD.      HISTORY OF PRESENT ILLNESS:  The patient is a 78-year-old lady.  She states that she started to have left arm pain and dental issues in 05/2018.  Her summer was miserable.  She continued to have pain and later developed dizziness as well.  Eventually was sent to the ER due to persistent pain and a CT scan was performed.  There was a low density pericardial region and a mildly complex pericardial cyst.  Repeat CT scan 10/24/2018 confirmed the finding of cystic mass involving the left side of the mediastinum extending from the left hilum towards the cardiac apex, abutting the pericardium over a long extent.  There appeared to be peripheral enhancing soft tissue elements as well as surrounding atelectatic lingula.  The patient had an MRI of the brain 10/31/2018 without any evidence of brain metastases, had an MR of the myocardium.  The mass appeared solid and involved the pericardium.  The patient was seen by Dr. Graham 11/6/2018 and due to local invasion of squamous cell cancer with pericardial effusion, curative surgery was not a possibility.  PET CT scan was recommended.      She had a PET CT scan 11/13/2018.  Her pathology from outside 11/1/2018 was consistent with squamous cell carcinoma.  It was a CT-guided a needle biopsy.  She continues to struggle with pain.  She was recently started on oxycodone with some help.  She is not able to sleep.      PAST MEDICAL HISTORY:  Hypertension.       MEDICATIONS:  Reviewed.      ALLERGIES:  REVIEWED.      SOCIAL HISTORY:  She used to work as a , smoked for 50 years, currently smoking.  Lives with her daughter.  She has 5 kids, 8 grandkids.      FAMILY HISTORY:  Mother had colon cancer, unknown age.  Brother had colon cancer 70s.  Son had testicular cancer in his  50s.      REVIEW OF SYSTEMS:  A complete review of systems was performed and found to be negative other than pertinent positives mentioned in history of present illness.      PHYSICAL EXAMINATION:   VITAL SIGNS:  Blood pressure is 113/74, respirations 18, temperature 97.7.   CONSTITUTIONAL: Sitting comfortably.   HEENT: Pupils are equal. Oropharynx is clear.   NECK: No cervical or supraclavicular lymphadenopathy.   RESPIRATORY: Clear bilaterally.   CARD/VASC: S1, S2, regular.   GI: Soft, nontender, nondistended, no hepatosplenomegaly.   MUSKULOSKELETAL: Warm, well perfused.   NEUROLOGIC: Alert, awake.   INTEGUMENT: No rash.   LYMPHATICS: No edema.   PSYCH: Mood and affect was normal.     LABORATORY DATA:  Reviewed and discussed in HPI.      ECOG performance status 2.      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  There is evidence of pericardium based mass and pericardial effusion.  There is a concern of intraluminal extension into superior vena cava and there is also a tumor deposit between ascending aorta and superior vena cava.  This was discussed with the Radiology Department today.  Her PET/CT scan is not formally resulted at this point.  The MRI scan outside is consistent with invasion of pericardium.      I had a detailed discussion with this lady and her son and daughter today.  We reviewed the PET scan images together.  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  I plan to review her case in our  multidisciplinary Thoracic Oncology conference today.      We will send her tumor for PD-L1 testing and also schedule for port placement and I plan to see her next week.      I appreciate the opportunity to participate in this patient's care.         REUBEN JOAQUIN MD        cc: Hesham Graham MD            D: 2018   T: 2018   MT: JUSTINO      Name:     RUBIN COHEN   MRN:      [id number]        Account:      [id number]   :      1940           Visit Date:   2018      Document: [id number]

## 2018-11-13 NOTE — PROGRESS NOTES
[facility]ANS  HEMATOLOGY ONCOLOGY    Visit Date:   11/13/2018      REASON FOR CONSULTATION:  New diagnosis of squamous cell carcinoma.      REFERRING PROVIDER:  Hesham Graham MD.      HISTORY OF PRESENT ILLNESS:  The patient is a 78-year-old lady.  She states that she started to have left arm pain and dental issues in 05/2018.  Her summer was miserable.  She continued to have pain and later developed dizziness as well.  Eventually was sent to the ER due to persistent pain and a CT scan was performed.  There was a low density pericardial region and a mildly complex pericardial cyst.  Repeat CT scan 10/24/2018 confirmed the finding of cystic mass involving the left side of the mediastinum extending from the left hilum towards the cardiac apex, abutting the pericardium over a long extent.  There appeared to be peripheral enhancing soft tissue elements as well as surrounding atelectatic lingula.  The patient had an MRI of the brain 10/31/2018 without any evidence of brain metastases, had an MR of the myocardium.  The mass appeared solid and involved the pericardium.  The patient was seen by Dr. Graham 11/6/2018 and due to local invasion of squamous cell cancer with pericardial effusion, curative surgery was not a possibility.  PET CT scan was recommended.      She had a PET CT scan 11/13/2018.  Her pathology from outside 11/1/2018 was consistent with squamous cell carcinoma.  It was a CT-guided a needle biopsy.  She continues to struggle with pain.  She was recently started on oxycodone with some help.  She is not able to sleep.      PAST MEDICAL HISTORY:  Hypertension.      MEDICATIONS:  Reviewed.      ALLERGIES:  REVIEWED.      SOCIAL HISTORY:  She used to work as a , smoked for 50 years, currently smoking.  Lives with her daughter.  She has 5 kids, 8 grandkids.      FAMILY HISTORY:  Mother had colon cancer, unknown age.  Brother had colon cancer 70s.  Son had testicular cancer in his   50s.      REVIEW OF SYSTEMS:  A complete review of systems was performed and found to be negative other than pertinent positives mentioned in history of present illness.      PHYSICAL EXAMINATION:   VITAL SIGNS:  Blood pressure is 113/74, respirations 18, temperature 97.7.   CONSTITUTIONAL: Sitting comfortably.   HEENT: Pupils are equal. Oropharynx is clear.   NECK: No cervical or supraclavicular lymphadenopathy.   RESPIRATORY: Clear bilaterally.   CARD/VASC: S1, S2, regular.   GI: Soft, nontender, nondistended, no hepatosplenomegaly.   MUSKULOSKELETAL: Warm, well perfused.   NEUROLOGIC: Alert, awake.   INTEGUMENT: No rash.   LYMPHATICS: No edema.   PSYCH: Mood and affect was normal.     LABORATORY DATA:  Reviewed and discussed in HPI.      ECOG performance status 2.      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  There is evidence of pericardium based mass and pericardial effusion.  There is a concern of intraluminal extension into superior vena cava and there is also a tumor deposit between ascending aorta and superior vena cava.  This was discussed with the Radiology Department today.  Her PET/CT scan is not formally resulted at this point.  The MRI scan outside is consistent with invasion of pericardium.      I had a detailed discussion with this lady and her son and daughter today.  We reviewed the PET scan images together.  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  I plan to review her case in our multidisciplinary Thoracic Oncology conference today.      We will send her tumor for PD-L1 testing and also schedule for port placement and I plan to see her next week.      I appreciate the opportunity to participate in this patient's care.         REUBEN JOAQUIN MD             D: 11/13/2018   T: 11/13/2018   MT: JUSTINO      Name:      RUBIN COHEN   MRN:      [id number]        Account:      [id number]   :      1940           Visit Date:   2018      Document: [id number]       cc: Hesham Graham MD

## 2018-11-13 NOTE — PROGRESS NOTES
Met with patient, and family, to discuss chemotherapy and resources available at the [facility]. Provided patient with  My Cancer Guidebook ,  Reviewed administration, side effects and ongoing symptom support management. Provided phone numbers for triage and after hours care. Reviewed most concerning symptoms that warrant an immediate call to the clinic nurse line.

## 2018-11-13 NOTE — NURSING NOTE
"Oncology Rooming Note    November 13, 2018 8:47 AM   Barbie Deluca is a 78 year old female who presents for:    Chief Complaint   Patient presents with     Oncology Clinic Visit     New; Mediastinal Mass     Initial Vitals: /74  Pulse 96  Temp 97.7  F (36.5  C) (Oral)  Ht 1.575 m (5' 2\")  Wt 56.5 kg (124 lb 9 oz)  SpO2 94%  BMI 22.78 kg/m2 Estimated body mass index is 22.78 kg/(m^2) as calculated from the following:    Height as of this encounter: 1.575 m (5' 2\").    Weight as of this encounter: 56.5 kg (124 lb 9 oz). Body surface area is 1.57 meters squared.  No Pain (0) Comment: Data Unavailable   No LMP recorded. Patient is postmenopausal.  Allergies reviewed: Yes  Medications reviewed: Yes    Medications: Medication refills not needed today.  Pharmacy name entered into [facility]: [facility] [address] [place] [address] [address] AT [facility] [address]    Clinical concerns:      8 minutes for nursing intake (face to face time)     Rosy Rico CMA              "

## 2018-11-19 NOTE — ANESTHESIA PREPROCEDURE EVALUATION
Anesthesia Pre-Procedure Evaluation    Patient: Barbie Deluca   MRN:     [id number] Gender:   female   Age:    78 year old :      1940        Preoperative Diagnosis: Malignant Neoplasm of Upper Lobe of Left Lung   Procedure(s):  Chest Port Placement     No past medical history on file.   No past surgical history on file.       Anesthesia Evaluation     .             ROS/MED HX    ENT/Pulmonary: Comment: Lung CA  Large accompanying pleural effusion    (+), . Other pulmonary disease pleural effusion.    Neurologic:       Cardiovascular: Comment: Cystic lesion (presumptive lung CA) abutting the mediastinum    (+) hypertension----. : . . . :. . Previous cardiac testing Echodate:10/2018results:No previous study for comparison.    Normal left ventricular size, borderline LVH, normal wall motion. Left   ventricle ejection fraction is normal. The calculated left ventricular   ejection fraction is 65%.    Normal right ventricular size and systolic function.    No obvious valvular disease    Trace pericardial effusion without tamponade physiology.    Left pleural effusion is identifieddate: results: date: results: date: results:          METS/Exercise Tolerance:  3 - Able to walk 1-2 blocks without stopping   Hematologic:         Musculoskeletal:         GI/Hepatic:         Renal/Genitourinary:         Endo:         Psychiatric: Comment: CHronic pain         Infectious Disease:         Malignancy:   (+) Malignancy History of Lung  Lung CA Active status post.         Other:                         PHYSICAL EXAM:   Mental Status/Neuro: Age Appropriate   Airway: Facies: Feasible  Mallampati: I  Mouth/Opening: Full  TM distance: > 6 cm  Neck ROM: Full   Respiratory:   Resp. Effort: Other    (decreased BS left)   CV: Rhythm: Regular  Rate: Age appropriate  Heart: Normal Sounds   Comments: Appears frail and moderately dyspneic                   No results found for: WBC, HGB, HCT, PLT, CRP, SED, NA, POTASSIUM,  "CHLORIDE, CO2, BUN, CR, GLC, PANTERA, PHOS, MAG, ALBUMIN, PROTTOTAL, ALT, AST, GGT, ALKPHOS, BILITOTAL, BILIDIRECT, LIPASE, AMYLASE, PAOLA, PTT, INR, FIBR, TSH, T4, T3, HCG, HCGS, CKTOTAL, CKMB, TROPN    Preop Vitals  BP Readings from Last 3 Encounters:   11/13/18 113/74   11/07/18 180/82    Pulse Readings from Last 3 Encounters:   11/13/18 96   11/07/18 104      Resp Readings from Last 3 Encounters:   11/07/18 16    SpO2 Readings from Last 3 Encounters:   11/13/18 94%   11/07/18 97%      Temp Readings from Last 1 Encounters:   11/13/18 36.5  C (97.7  F) (Oral)    Ht Readings from Last 1 Encounters:   11/13/18 1.575 m (5' 2\")      Wt Readings from Last 1 Encounters:   11/13/18 56.5 kg (124 lb 9 oz)    Estimated body mass index is 22.78 kg/(m^2) as calculated from the following:    Height as of 11/13/18: 1.575 m (5' 2\").    Weight as of 11/13/18: 56.5 kg (124 lb 9 oz).     LDA:            Assessment:   ASA SCORE: 4    NPO Status: > 6 hours since completed Solid Foods   Documentation: H&P complete; Preop Testing complete; Consents complete   Proceeding: Proceed without further delay  Tobacco Use:  NO Active use of Tobacco/UNKNOWN Tobacco use status     Plan:   Anes. Type:  MAC   Pre-Induction: Midazolam IV   Induction:  Not applicable   Airway: Native Airway   Access/Monitoring: PIV   Maintenance: N/a   Emergence: N/a   Logistics: Same Day Surgery     Postop Pain/Sedation Strategy:  Standard-Options: Opioids PRN     PONV Management:  Adult Risk Factors: Female, Non-Smoker, Postop Opioids  Prevention: Ondansetron; Dexamethasone     CONSENT: Direct conversation   Plan and risks discussed with: Patient   Blood Products: Consent Deferred (Minimal Blood Loss)     Comments for Plan/Consent:  Plan:  MAC with routine monitors    Scooby Kay MD         Globally weak from neoplastic dissease                Scooby Kay MD  "

## 2018-11-20 PROBLEM — Z51.11 ENCOUNTER FOR ANTINEOPLASTIC CHEMOTHERAPY: Status: ACTIVE | Noted: 2018-01-01

## 2018-11-20 PROBLEM — C34.92 NON-SMALL CELL CANCER OF LEFT LUNG (H): Status: ACTIVE | Noted: 2018-01-01

## 2018-11-20 PROBLEM — Z51.89 ENCOUNTER FOR OTHER SPECIFIED AFTERCARE: Status: ACTIVE | Noted: 2018-01-01

## 2018-11-20 NOTE — IP AVS SNAPSHOT
[facility]    [address] [place] [id number]    Phone:  [phone]    Fax:  [phone]                                       After Visit Summary   11/20/2018    Barbie Deluca    MRN: [id number]           After Visit Summary Signature Page     I have received my discharge instructions, and my questions have been answered. I have discussed any challenges I see with this plan with the nurse or doctor.    ..........................................................................................................................................  Patient/Patient Representative Signature      ..........................................................................................................................................  Patient Representative Print Name and Relationship to Patient    ..................................................               ................................................  Date                                   Time    ..........................................................................................................................................  Reviewed by Signature/Title    ...................................................              ..............................................  Date                                               Time          22EPIC Rev 08/18

## 2018-11-20 NOTE — LETTER
11/20/2018       RE: Barbie Deluca  [address] [facility] [address]     Dear Colleague,    Thank you for referring your patient, Barbie Deluca, to the [facility]. Please see a copy of my visit note below.    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  CT scan 10/24/2018:  A cystic mass involving the left side of the mediastinum extending from the left hilum towards the cardiac apex, abutting the pericardium over a long extent peripherally.  Enhancing soft tissue element as well surrounding atelectatic lingula.  MRA brain 10/31/2018, no brain metastases.  PET/CT scan 11/13/2018 and CT-guided biopsy 11/01/2018 consistent with squamous cell carcinoma.      TREATMENT:  The patient is initiating neoadjuvant treatment with carboplatin and Taxol.  Plan to complete 2-3 cycles followed by a CT scan and a plan for sequential radiation followed by maintenance immune therapy.      SUBJECTIVE:  The patient was seen as a followup today.  She came in with 3 of her family members.  She had some improvement in her left-sided shoulder pain.  She is taking 1 pill of Percocet almost every 6 hours.     REVIEW OF SYSTEMS:  A complete review of systems was performed and found to be negative other than pertinent positives mentioned in history of present illness.      Past medical, social histories reviewed.     Meds- Reviewed.     PHYSICAL EXAMINATION:   VITAL SIGNS:  Blood pressure 144/85, pulse 105, temperature 98.2.   CONSTITUTIONAL:  Sitting on chair, appears tired.   NEUROLOGIC:  Alert, awake.   PSYCHIATRIC:  Appears anxious.      LABORATORY DATA AND IMAGING REVIEWED DURING THIS VISIT:  No results for input(s): NA, POTASSIUM, CHLORIDE, CO2, ANIONGAP, BUN, CR, GLC, PANTERA, MAG, PHOS in the last  23[address] hours.  Recent Labs   Lab Test  11/20/18   0925   WBC  10.7   HGB  11.3*   PLT  504*   MCV  89     No results for input(s): BILITOTAL, ALKPHOS, ALT, AST, ALBUMIN, LDH in the last 24592 hours.      Results for orders placed or performed during the hospital encounter of 11/13/18   PET Oncology Whole Body    Narrative    Combined Report of:    PET and CT on  11/13/2018 8:30 AM :    1. PET of the neck, chest, abdomen, and pelvis.  2. PET CT Fusion for Attenuation Correction and Anatomical  Localization:    3. Diagnostic CT scan of the chest, abdomen, and pelvis with  intravenous contrast for interpretation.  3. CT of the chest, abdomen and pelvis obtained for diagnostic  interpretation.  4. 3D MIP and PET-CT fused images were processed on an independent  workstation and archived to PACS and reviewed by a radiologist.    Technique:  1. PET: The patient received 1.08 mCi of F-18-FDG; the serum glucose  was 104 prior to administration, body weight was 57 kg. Images were  evaluated in the axial, sagittal, and coronal planes as well as the  rotational whole body MIP. Images were acquired from the Vertex to the  Feet.    UPTAKE WAS MEASURED AT 67 MINUTES.     BACKGROUND:  Liver SUV max= 3.8,   Aorta Blood SUV Max: 2.97.     2. CT: Volumetric acquisition for clinical interpretation of the  chest, abdomen, and pelvis acquired at 3 mm sections . The chest,  abdomen, and pelvis were evaluated at 5 mm sections in bone, soft  tissue, and lung windows.      The patient received 77 cc. Of Isovue 370 intravenously for the  examination.      3. 3D MIP and PET-CT fused images were processed on an independent  workstation and archived to PACS and reviewed by a radiologist.    INDICATION: ; 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)    ADDITIONAL INFORMATION OBTAINED FROM EMR: Squamous cell cancer of the  lung     COMPARISON: Chest CT from outside center dated 10/24/2018 and outside  brain MRI 10/31/2018    FINDINGS:      HEAD/NECK: Misregistration of the PET and CT data due to patient  motion in the head and neck.    No abnormal intracranial enhancing lesion. No abnormal FDG uptake  intracranially within the limits of this study. Periventricular  hypoattenuation secondary to chronic small vessel ischemic disease as  better seen on outside brain MRI. Clear paranasal sinuses and mastoid  air cells. Bilateral normal orbits.    The mucosal pharyngeal space, the , prevertebral and carotid  spaces are within normal limits.     No masses, mass effect or pathologically enlarged lymph nodes are  evident. The thyroid gland is normal.    Regarding the vasculature, bilateral calcified plaques at the carotid  bifurcations and calcified plaques at the aortic arch.     CHEST:     There is a centrally necrotic, peripherally  solid/hypermetabolic/enhancing (max SUV: 17.2, TL , MTV 13.3)  7.3 x 5.2 x 7.6 cm (AP by transverse by craniocaudal) mass in the left   lingular segment extending to mediastinum. Left lingular segmental  branches completely obstructed due to mass and there is associated  atelectasis. This mass invades the adjacent pericardium. 1 cm  pericardial effusion the inferior aspect of the pericardium. There is  a circumferential masslike focus in the superior mediastinum, encasing  the lateral margin of the ascending aorta (1.2 cm in max thickness x  3.4 cm axial length), demonstrating similar imaging characteristics to  the left lingular lesion, between superior vena cava and ascending  aorta (max SUV: 9.2, TL.4, MTV: 11.06), possibly representing a  metastatic deposit within a pericardial recess.  The max thickness on  prior CT is 1.1 cm.  There is a separate FDG avid 1.4 x 0.8 cm focus (max SUV 8.3) along  the right lateral aspect of the SVC, likely extraluminal deposit   (series 4 image 128). This was smaller on prior CT.   No pleural effusion. No consolidation. Few tiny calcified granulomas  less than 2 mm  in size in the right lung.  No axillary lymph nodes. Bilateral breast prostheses.    ABDOMEN AND PELVIS:  There is no suspicious FDG uptake in the abdomen or pelvis.    There are no suspicious hepatic lesions. Cholecystectomy. Prominent  appearance of intra and extrahepatic biliary ducts likely secondary to  cholecystectomy. There is no splenomegaly or evidence for splenic or  pancreatic mass lesion.  There are no suspicious adrenal mass lesions. There is symmetric  nephrographic renal phase without hydronephrosis.    Regarding the abdominal vasculature, there is a large ulcerated plaque  immediately at the origin of the renal arteries. There is a 4 cm long  3.3 cm wide infrarenal abdominal aortic aneurysm.    Age-appropriate uterus. Intact bilateral adnexa.    There is no evidence for diverticulitis, bowel obstruction or free  fluid.    LOWER EXTREMITIES:   No abnormal masses or hypermetabolic lesions.    BONES:   There are no suspicious lytic or blastic osseous lesions.There is no  abnormal FDG uptake in the skeleton. Mild lumbar scoliosis and  degenerative spondylosis. Most notably, there is moderate left neural  foraminal stenosis at L5-S1.       Impression    IMPRESSION:   1a. 7.3 x 5.3 x 7.6 cm centrally necrotic, peripherally  solid/hypermetabolic left upper lobe index mass, involving the  pericardium and extending into the mediastinum. This is compatible  with known diagnosis of pulmonary squamous cell cancer. It is slightly  larger when compared with 10/24/18, previously measuring 6.4 x 5.3 cm.    Ib. Hypermetabolic soft tissue lesion in the right side of the  superior mediastinum, between superior vena cava and ascending aorta,  circumferentially surrounding the ascending aorta suspicious for tumor  deposit. Smaller focus along the right lateral wall of the superior  vena cava. These are more conspicuous than prior CT.  2. No distant metastatic disease elsewhere in the body or  contralateral lung.  3. 3.3  cm Infrarenal abdominal aortic aneurysm.    I have personally reviewed the examination and initial interpretation  and I agree with the findings.    CHINYERE MILIAN MD     ECOG PS: 1     ASSESSMENT:  This is a 78-year-old lady with squamous cell carcinoma, which appears to have originated from the main stem bronchus.  There is evidence of a pericardium-based mass and pericardial effusion.  There is a concern for intraluminal extension into the superior vena cava and a tumor deposit between the ascending aorta and superior vena cava.      Her case was reviewed in our multidisciplinary Thoracic Oncology conference on 11/13/2018 with a concern for M1 disease discussed; however, the consensus was to approach with a curative intent starting with chemotherapy and followed by assessment of response and doing sequential radiation to her chest as well.  This was discussed in detail with the patient.  I discussed the option of using chemotherapy with carboplatin and Taxol every 21 days.  Risks, benefits and the potential side effects were discussed in detail.  The patient asked appropriate questions and is willing to proceed.      Our existing plan is to complete 2-3 cycles of chemotherapy followed by a PET/CT scan.  In the meantime, we will have her see Radiation Oncology and will likely proceed with sequential radiation if she has a good response to treatment.      The patient has relatively better pain control, but still pain control is not optimal.  I will make changes to her pain regimen.  I will also have her see Palliative Care for compromised quality of life.  She is also nauseated, and we will start her on Zofran.      PLAN:   1.  Start OxyContin 10 mg q. 12 hours.  I asked the patient to start 1 pill at night for a few days, and if needed, then go up to twice a day of this pill.   2.  She will continue Percocet, but will use it every 6 hours as-needed basis.   3.  Port placement as scheduled.   4.  Start chemotherapy  with carboplatin and Taxol every 21 days.  Chemo education today.   5.  During the first cycle of chemotherapy, she will be seeing a provider every week and after that hopefully with every cycle.   6.  Zofran 4 mg sublingual every 6 hours as needed for nausea.   7.  Refer to Palliative Care.      cc:   Hesham Graham MD   [facility]acic Surgery    [address] [place] [id number]      Donita Pina MD   [facility] Radiation Oncology    [address] [place] [id number]         TG SOLARES MD             D: 2018   T: 2018   MT: fco      Name:     RUBIN COHEN   MRN:      [id number]        Account:      [id number]   :      1940           Service Date: 2018      Document: [id number]        Again, thank you for allowing me to participate in the care of your patient.      Sincerely,    Tg Solares MD

## 2018-11-20 NOTE — MR AVS SNAPSHOT
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   2- Percocet 5/325 every 6 hours as needed  3- Port placement as scheduled  4- Start chemotherapy with Carboplatin and Taxol every 21 days. Chemo education today  5- See NP/MD first day of chemotherapy and then weekly during the first cycle.   6- Zofran 4 mg SL every 6 hours as needed for nausea  7- Refer to palliative care          Follow-ups after your visit        Additional Services     PALLIATIVE CARE REFERRAL       Your provider has referred you to Palliative Care Services.    To schedule an Outpatient Palliative Care Referral appointment, please call: palliative care.    Please be aware that coverage of these services is subject to the terms and limitations of your health insurance plan.  Call member services at your health plan with any benefit or coverage questions.      Please bring the following with you to your appointment:    (1) Any X-Rays, CTs or MRIs which have been performed.  Contact the facility where they were done to arrange for  prior to your scheduled appointment.   (2) If you have recently seen a provider outside of the [facility], please bring your most recent clinic note and/or imaging results  (3) List of current medications - please bring ALL of the medications that you are currently taking (in their original bottles) to your appointment  (4) This referral request  (5) Any documents/labs given to you for this referral    Services Requested:  Evaluate and treat symptoms (including writing prescriptions)    Please complete the following questions:  1. What is patient's life-limiting diagnosis? Lung cancer  2. What is the reason for the referral? Poor quality of life, pain  3. What is the patient's prognosis? Attempting curative treatment    Palliative Care Definition:    Palliative Care is specialized medical care for people with serious illness.  This type of care is focused on providing patients with relief from symptoms, pain and stresses of serious illness - whatever the diagnosis may be.  The goal of Palliative Care is to improve quality of life for both the patient and the family.  Palliative Care is provided by a team of doctors, nurses and other specialists who work with the patient's other doctors to provide an extra layer of support.  Palliative Care is appropriate at any age and at any stage in a serious illness, and can be provided together with curative treatment.                  Your next 10 appointments already scheduled     Nov 21, 2018  9:00 AM CST   Masonic Lab Draw with UC MASONIC LAB DRAW   [facility])    [address] [place] [address]  [address]  [facility] [id number] [phone]            Nov 21, 2018 10:00 AM CST   Infusion 180 with UC ONCOLOGY INFUSION, UC 10 ATC   [facility] ([facility])    [address]  [address]  [facility] [address]   [phone]            Nov 28, 2018  9:00 AM CST   Masonic Lab Draw with UC MASONIC LAB DRAW   [facility]    [address]  [address]  [facility] [address]   [phone]            Nov 28, 2018  9:30 AM CST   (Arrive by 9:15 AM)   Return Visit with Atiya Houston PA-C   [facility] ([facility])    [address]  [address]  [facility] [address] [phone]             Dec 05, 2018  9:45 AM CST   Masonic Lab Draw with  MASONIC LAB DRAW   [facility])    [address] [place] [address]  [address]  [facility] [address]   [phone]            Dec 05, 2018 10:20 AM CST   (Arrive by 10:05 AM)   Return Visit with Atiya Houston PA-C   [facility] ([facility])    [address] [place] [address]  [address]  [facility] [address]   [phone]            Dec 05, 2018 12:45 PM CST   (Arrive by 12:30 PM)   New Patient Visit with Linus Tolentino MD   [facility] ([facility])    [address] [place] [address]  [address]  [facility] [address]   [phone]            Dec 12, 2018  9:00 AM CST   Masonic Lab Draw with  MASONIC LAB DRAW   [facility] Masonic Lab Draw ([facility])    [address] [place] [address]  [address]  [facility] [address]   [phone]            Dec 12, 2018  9:30 AM CST   (Arrive by 9:15 AM)   Return Visit with Atiya Houston PA-C   [facility] ([facility])    [address] [place] [address]  [address]  [facility] [address]   [phone]              Who to contact     If you have questions or need follow up information about today's clinic visit or your schedule please contact [facility] directly at [phone].  Normal or non-critical lab and imaging results will be communicated to you by MyChart, letter or phone within 4 business days after the clinic has received the results. If you do not hear from us within 7 days, please contact the clinic through MyChart or phone. If you have a critical or abnormal lab result, we will notify you by phone as soon as possible.  Submit refill requests through Science or call your pharmacy and they will forward the refill request to us. Please allow 3 business days for your refill to be completed.           Additional Information About Your Visit        Fetchmobhart Information     [employer] gives you secure access to your electronic health record. If you see a primary care provider, you can also send messages to your care team and make appointments. If you have questions, please call your primary care clinic.  If you do not have a primary care provider, please call [phone] and they will assist you.        Care EveryWhere ID     This is your Care EveryWhere ID. This could be used by other organizations to access your [facility] medical records  [id number]        Your Vitals Were     Pulse Temperature Pulse Oximetry BMI (Body Mass Index)          105 98.2  F (36.8  C) (Oral) 94% 22.2 kg/m2         Blood Pressure from Last 3 Encounters:   11/20/18 102/71   11/20/18 144/85   11/13/18 113/74    Weight from Last 3 Encounters:   11/20/18 54.9 kg (121 lb)   11/20/18 55.1 kg (121 lb 6.4 oz)   11/13/18 56.5 kg (124 lb 9 oz)              We Performed the Following     PALLIATIVE CARE REFERRAL     PD-L1 by Immunohistochemistry          Today's Medication Changes          These changes are accurate as of 11/20/18 12:54 PM.  If you have any questions, ask your nurse or doctor.               Start taking these medicines.        Dose/Directions    ondansetron 4 MG ODT tab   Commonly known as:  ZOFRAN-ODT   Used for:  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)   Started by:  Tg Solares MD        Dose:  4 mg   Take 1 tablet (4 mg) by mouth every 6 hours as needed for nausea   Quantity:  50 tablet   Refills:  3       oxyCODONE 10 MG 12 hr tablet   Commonly known as:  OxyCONTIN   Used for:  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)   Replaces:  oxyCODONE IR 5 MG tablet   Started by:  Tg Solares MD        Dose:  10 mg   Take 1 tablet (10 mg) by mouth every 12 hours maximum 2 tablet(s) per day   Quantity:  60 tablet   Refills:  0         These medicines have changed or have updated prescriptions.        Dose/Directions     oxyCODONE-acetaminophen 5-325 MG per tablet   Commonly known as:  PERCOCET   This may have changed:    - how much to take  - when to take this   Used for:  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)   Changed by:  Tg Solares MD        Dose:  1 tablet   Take 1 tablet by mouth every 6 hours as needed for severe pain   Quantity:  50 tablet   Refills:  0         Stop taking these medicines if you haven't already. Please contact your care team if you have questions.     oxyCODONE IR 5 MG tablet   Commonly known as:  ROXICODONE   Replaced by:  oxyCODONE 10 MG 12 hr tablet   Stopped by:  Tg Solares MD                Where to get your medicines      These medications were sent to [id number] - [place], [place] - [address] [facility] [place] [facility] [address], [facility] [address]    Hours:  called pharm.  they do accept faxes Phone:  [phone]     ondansetron 4 MG ODT tab         Some of these will need a paper prescription and others can be bought over the counter.  Ask your nurse if you have questions.     Bring a paper prescription for each of these medications     oxyCODONE 10 MG 12 hr tablet    oxyCODONE-acetaminophen 5-325 MG per tablet               Information about OPIOIDS     PRESCRIPTION OPIOIDS: WHAT YOU NEED TO KNOW   We gave you an opioid (narcotic) pain medicine. It is important to manage your pain, but opioids are not always the best choice. You should first try all the other options your care team gave you. Take this medicine for as short a time (and as few doses) as possible.    Some activities can increase your pain, such as bandage changes or therapy sessions. It may help to take your pain medicine 30 to 60 minutes before these activities. Reduce your stress by getting enough sleep, working on hobbies you enjoy and practicing relaxation or meditation. Talk to your care team about ways to manage your pain beyond prescription  opioids.    These medicines have risks:    DO NOT drive when on new or higher doses of pain medicine. These medicines can affect your alertness and reaction times, and you could be arrested for driving under the influence (DUI). If you need to use opioids long-term, talk to your care team about driving.    DO NOT operate heavy machinery    DO NOT do any other dangerous activities while taking these medicines.    DO NOT drink any alcohol while taking these medicines.     If the opioid prescribed includes acetaminophen, DO NOT take with any other medicines that contain acetaminophen. Read all labels carefully. Look for the word  acetaminophen  or  Tylenol.  Ask your pharmacist if you have questions or are unsure.    You can get addicted to pain medicines, especially if you have a history of addiction (chemical, alcohol or substance dependence). Talk to your care team about ways to reduce this risk.    All opioids tend to cause constipation. Drink plenty of water and eat foods that have a lot of fiber, such as fruits, vegetables, prune juice, apple juice and high-fiber cereal. Take a laxative (Miralax, milk of magnesia, Colace, Senna) if you don t move your bowels at least every other day. Other side effects include upset stomach, sleepiness, dizziness, throwing up, tolerance (needing more of the medicine to have the same effect), physical dependence and slowed breathing.    Store your pills in a secure place, locked if possible. We will not replace any lost or stolen medicine. If you don t finish your medicine, please throw away (dispose) as directed by your pharmacist. The [place] Pollution Control Agency has more information about safe disposal: https://www.pca.[facility].[place].us/living-green/managing-unwanted-medications         Primary Care Provider Office Phone # Fax #    Erik Reaves [phone]       [facility] [place] [address] [place] [address]        Equal Access to  Services     [facility]: Hadii aad ku hadjosefinajoseph Darylali, waaxda luqadaha, qaybta kaalmada flip, gita guevara . So [facility] [phone].    ATENCIÓN: Si habla kailey, tiene a alarcon disposición servicios gratuitos de asistencia lingüística. Llame al [phone].    We comply with applicable federal civil rights laws and [place] laws. We do not discriminate on the basis of race, color, national origin, age, disability, sex, sexual orientation, or gender identity.            Thank you!     Thank you for choosing [facility]  for your care. Our goal is always to provide you with excellent care. Hearing back from our patients is one way we can continue to improve our services. Please take a few minutes to complete the written survey that you may receive in the mail after your visit with us. Thank you!             Your Updated Medication List - Protect others around you: Learn how to safely use, store and throw away your medicines at www.disposemymeds.org.          This list is accurate as of 11/20/18 12:54 PM.  Always use your most recent med list.                   Brand Name Dispense Instructions for use Diagnosis    acetaminophen 500 MG tablet    TYLENOL     Take 500-1,000 mg by mouth        GAS-X PO      Take by mouth 4 times daily as needed for intestinal gas        losartan 50 MG tablet    COZAAR     TK 1 T PO D        ondansetron 4 MG ODT tab    ZOFRAN-ODT    50 tablet    Take 1 tablet (4 mg) by mouth every 6 hours as needed for nausea    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)       oxyCODONE 10 MG 12 hr tablet    OxyCONTIN    60 tablet    Take 1 tablet (10 mg) by mouth every 12 hours maximum 2 tablet(s) per day    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)       oxyCODONE-acetaminophen 5-325 MG per tablet    PERCOCET    50 tablet    Take 1 tablet by mouth every 6 hours as needed for severe pain    Malignant neoplasm of upper lobe of left lung  (H)       PEPCID 20 MG tablet   Generic drug:  famotidine      Take 20 mg by mouth        ZOFRAN 4 MG tablet   Generic drug:  ondansetron      Take 4 mg by mouth

## 2018-11-20 NOTE — DISCHARGE INSTRUCTIONS
A collaboration between [facility] and [facility]  Experts in minimally invasive, targeted treatments performed using imaging guidance    Venous Access Device,  Port Catheter or Tunneled or Non-Tunneled Central Line Placement    Today you had a procedure today to install a venous access device; either a tunneled central vein catheter or a subcutaneous port catheter.    One of our Radiology PAs performed this procedure for you today:  ? Jamie Nuñez PA-C  ? MISHA Solomon PA-C  ? Brice Serrano PA-C  ? Anca Mock PA-C   ? Denver Henderson PA-C    After you go home:  - Drink plenty of fluids.  Generally 6-8 (8 ounce) glasses a day is recommended.  - Resume your regular diet unless otherwise ordered by a medical provider.  - Keep any applied tape/gauze dressings clean and dry.  Change tape/gauze dressings if they get wet or soiled.  - You may shower the following day after procedure, however cover and protect from moisture any tape/gauze dressings.  You may let water hit and run over dried skin glue, but do not scrub.  Pat the area dry after showering.  - Port placement incisions are closed with absorbable suture, meaning they do not need to be removed at a later date, and a topical skin adhesive (skin glue).  This glue will wear off in 7-14 days.  Do not remove before this time.  If 14 days have passed and residual glue is present, you may gently remove it.  - Do not apply gels, lotions, or ointments to the glue site for the first 10 days as this may cause the glue to prematurely soften and fail.  - Do not perform strenuous activities or lift greater than 10 pounds for the next three days.  - If there is bleeding or oozing from the procedure site, apply firm pressure to the area for 5-10 minutes.  If the bleeding continues seek medical advice at the numbers below.  - Mild procedure site discomfort can be treated with an ice pack and over-the-counter pain  relievers.        For 24 hours after any sedation used:  - Relax and take it easy.  No strenuous activities.  - Do not drive or operate machines at home or at work.  - No alcohol consumption.  - Do not make any important or legal decisions.    Call our Interventional Radiology (IR) service if:  - If you start bleeding from the procedure site.  If you do start to bleed from the site, lie down and hold some pressure on the site.  Our radiology provider can help you decide if you need to return to the hospital.  - If you have new or worsening pain related to the procedure.  - If you have concerning swelling at the procedure site.  - If you develop persistent nausea or vomiting.  - If you develop hives or a rash or any unexplained itching.  - If you have a fever of greater than 100.5  F and chills in the first 5 days after procedure.  - Any other concerns related to your procedure.      [facility]  Interventional Radiology (IR)  [address] [place] [address]    Contact Number:  [phone]  (IR control desk)  - Monday - Friday 8:00 am - 4:30 pm    After hours for urgent concerns:  [phone]  - After 4:30 pm Monday - Friday, Weekends and Holidays.   - Ask for Interventional Radiology on-call.  Someone is available 24 hours a day.  - [facility] toll free number:  [phone]

## 2018-11-20 NOTE — ANESTHESIA CARE TRANSFER NOTE
Patient: Barbie Deluca    Procedure(s):  Chest Port Placement    Diagnosis: Malignant Neoplasm of Upper Lobe of Left Lung  Diagnosis Additional Information: No value filed.    Anesthesia Type:   No value filed.     Note:  Airway :Room Air  Patient transferred to:Phase II  Handoff Report: Identifed the Patient, Identified the Reponsible Provider, Reviewed the pertinent medical history, Discussed the surgical course, Reviewed Intra-OP anesthesia mangement and issues during anesthesia, Set expectations for post-procedure period and Allowed opportunity for questions and acknowledgement of understanding      Vitals: (Last set prior to Anesthesia Care Transfer)    CRNA VITALS  11/20/2018 1007 - 11/20/2018 1045      11/20/2018             Pulse: 94    SpO2: 100 %    Resp Rate (set): 10                Electronically Signed By: DEVIN Rowe CRNA  November 20, 2018  10:45 AM

## 2018-11-20 NOTE — IP AVS SNAPSHOT
MRN:[id number]                      After Visit Summary   11/20/2018    Barbie Deluca    MRN: [id number]           Thank you!     Thank you for choosing [facility] for your care. Our goal is always to provide you with excellent care. Hearing back from our patients is one way we can continue to improve our services. Please take a few minutes to complete the written survey that you may receive in the mail after you visit with us. Thank you!        Patient Information     Date Of Birth          1940        About your hospital stay     You were admitted on:  November 20, 2018 [facility]    You were discharged on:  November 20, 2018       Who to Call     For medical emergencies, please call 911.  For non-urgent questions about your medical care, please call your primary care provider or clinic, [phone]  For questions related to your surgery, please call your surgery clinic        Attending Provider     Provider Specialty    Anca Mock PA-C Physician Assistant       Primary Care Provider Office Phone # Fax #    Erik Reaves [phone]      Further instructions from your care team         A collaboration between [facility] and [facility]  Experts in minimally invasive, targeted treatments performed using imaging guidance    Venous Access Device,  Port Catheter or Tunneled or Non-Tunneled Central Line Placement    Today you had a procedure today to install a venous access device; either a tunneled central vein catheter or a subcutaneous port catheter.    One of our Radiology PAs performed this procedure for you today:  ? Jamie Nuñez PA-C  ? MISHA Solomon PA-C  ? Brice Serrano PA-C  ? Anca Mock PA-C   ? Denver Henderson PA-C    After you go home:  - Drink plenty of fluids.  Generally 6-8 (8 ounce) glasses a day is recommended.  - Resume your  regular diet unless otherwise ordered by a medical provider.  - Keep any applied tape/gauze dressings clean and dry.  Change tape/gauze dressings if they get wet or soiled.  - You may shower the following day after procedure, however cover and protect from moisture any tape/gauze dressings.  You may let water hit and run over dried skin glue, but do not scrub.  Pat the area dry after showering.  - Port placement incisions are closed with absorbable suture, meaning they do not need to be removed at a later date, and a topical skin adhesive (skin glue).  This glue will wear off in 7-14 days.  Do not remove before this time.  If 14 days have passed and residual glue is present, you may gently remove it.  - Do not apply gels, lotions, or ointments to the glue site for the first 10 days as this may cause the glue to prematurely soften and fail.  - Do not perform strenuous activities or lift greater than 10 pounds for the next three days.  - If there is bleeding or oozing from the procedure site, apply firm pressure to the area for 5-10 minutes.  If the bleeding continues seek medical advice at the numbers below.  - Mild procedure site discomfort can be treated with an ice pack and over-the-counter pain relievers.        For 24 hours after any sedation used:  - Relax and take it easy.  No strenuous activities.  - Do not drive or operate machines at home or at work.  - No alcohol consumption.  - Do not make any important or legal decisions.    Call our Interventional Radiology (IR) service if:  - If you start bleeding from the procedure site.  If you do start to bleed from the site, lie down and hold some pressure on the site.  Our radiology provider can help you decide if you need to return to the hospital.  - If you have new or worsening pain related to the procedure.  - If you have concerning swelling at the procedure site.  - If you develop persistent nausea or vomiting.  - If you develop hives or a rash or any  "unexplained itching.  - If you have a fever of greater than 100.5  F and chills in the first 5 days after procedure.  - Any other concerns related to your procedure.      [facility]  Interventional Radiology (IR)  [address]  2nd Floor, [facility] [place] [address]    Contact Number:  [phone]  (IR control desk)  - Monday - Friday 8:00 am - 4:30 pm    After hours for urgent concerns:  [phone]  - After 4:30 pm Monday - Friday, Weekends and Holidays.   - Ask for Interventional Radiology on-call.  Someone is available 24 hours a day.  - [facility] toll free number:  [phone]          Pending Results     Date and Time Order Name Status Description    11/20/2018 0910 IR CHEST PORT PLACEMENT > 5 YRS OF AGE In process     11/14/2018 0000 PD-L1 BY IMMUNOHISTOCHEMISTRY In process             Admission Information     Date & Time Provider Department Dept. Phone    11/20/2018 Anca Mock PA-C [facility] [phone]      Your Vitals Were     Blood Pressure Pulse Temperature Respirations Height Weight    135/71 98 98  F (36.7  C) 16 1.575 m (5' 2\") 54.9 kg (121 lb)    Pulse Oximetry BMI (Body Mass Index)                90% 22.13 kg/m2          [employer] Information     [employer] gives you secure access to your electronic health record. If you see a primary care provider, you can also send messages to your care team and make appointments. If you have questions, please call your primary care clinic.  If you do not have a primary care provider, please call [phone] and they will assist you.      [employer] is an electronic gateway that provides easy, online access to your medical records. With [employer], you can request a clinic appointment, read your test results, renew a prescription or communicate with your care team.     To access your existing account, please contact your [facility] or call [phone] for " assistance.        Care EveryWhere ID     This is your Care EveryWhere ID. This could be used by other organizations to access your [facility] medical records  [id number]        Equal Access to Services     MARCY MÁRQUEZ : Elena Verma, campbellshad garayerinha, fabita kakori castelan, waxhumberto brenda martinselvin weinerayla loza ismael starr. So [facility] [phone].    ATENCIÓN: Si habla español, tiene a alarcon disposición servicios gratuitos de asistencia lingüística. Llame al [phone].    We comply with applicable federal civil rights laws and [place] laws. We do not discriminate on the basis of race, color, national origin, age, disability, sex, sexual orientation, or gender identity.               Review of your medicines      UNREVIEWED medicines. Ask your doctor about these medicines        Dose / Directions    acetaminophen 500 MG tablet   Commonly known as:  TYLENOL        Dose:  500-1000 mg   Take 500-1,000 mg by mouth   Refills:  0       GAS-X PO        Take by mouth 4 times daily as needed for intestinal gas   Refills:  0       losartan 50 MG tablet   Commonly known as:  COZAAR        TK 1 T PO D   Refills:  11       ondansetron 4 MG ODT tab   Commonly known as:  ZOFRAN-ODT   Used for:  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)        Dose:  4 mg   Take 1 tablet (4 mg) by mouth every 6 hours as needed for nausea   Quantity:  50 tablet   Refills:  3       oxyCODONE 10 MG 12 hr tablet   Commonly known as:  OxyCONTIN   Used for:  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)        Dose:  10 mg   Take 1 tablet (10 mg) by mouth every 12 hours maximum 2 tablet(s) per day   Quantity:  60 tablet   Refills:  0       oxyCODONE-acetaminophen 5-325 MG per tablet   Commonly known as:  PERCOCET   Used for:  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)        Dose:  1 tablet   Take 1 tablet by mouth every 6 hours as needed for severe pain   Quantity:  50 tablet   Refills:  0       PEPCID 20 MG tablet    Generic drug:  famotidine        Dose:  20 mg   Take 20 mg by mouth   Refills:  0       ZOFRAN 4 MG tablet   Generic drug:  ondansetron        Dose:  4 mg   Take 4 mg by mouth   Refills:  0                Protect others around you: Learn how to safely use, store and throw away your medicines at www.disposemymeds.org.             Medication List: This is a list of all your medications and when to take them. Check marks below indicate your daily home schedule. Keep this list as a reference.      Medications           Morning Afternoon Evening Bedtime As Needed    acetaminophen 500 MG tablet   Commonly known as:  TYLENOL   Take 500-1,000 mg by mouth                                GAS-X PO   Take by mouth 4 times daily as needed for intestinal gas                                losartan 50 MG tablet   Commonly known as:  COZAAR   TK 1 T PO D                                ondansetron 4 MG ODT tab   Commonly known as:  ZOFRAN-ODT   Take 1 tablet (4 mg) by mouth every 6 hours as needed for nausea                                oxyCODONE 10 MG 12 hr tablet   Commonly known as:  OxyCONTIN   Take 1 tablet (10 mg) by mouth every 12 hours maximum 2 tablet(s) per day                                oxyCODONE-acetaminophen 5-325 MG per tablet   Commonly known as:  PERCOCET   Take 1 tablet by mouth every 6 hours as needed for severe pain                                PEPCID 20 MG tablet   Take 20 mg by mouth   Generic drug:  famotidine                                ZOFRAN 4 MG tablet   Take 4 mg by mouth   Generic drug:  ondansetron

## 2018-11-20 NOTE — LETTER
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  CT scan 10/24/2018:  A cystic mass involving the left side of the mediastinum extending from the left hilum towards the cardiac apex, abutting the pericardium over a long extent peripherally.  Enhancing soft tissue element as well surrounding atelectatic lingula.  MRA brain 10/31/2018, no brain metastases.  PET/CT scan 11/13/2018 and CT-guided biopsy 11/01/2018 consistent with squamous cell carcinoma.      TREATMENT:  The patient is initiating neoadjuvant treatment with carboplatin and Taxol.  Plan to complete 2-3 cycles followed by a CT scan and a plan for sequential radiation followed by maintenance immune therapy.      SUBJECTIVE:  The patient was seen as a followup today.  She came in with 3 of her family members.  She had some improvement in her left-sided shoulder pain.  She is taking 1 pill of Percocet almost every 6 hours.     REVIEW OF SYSTEMS:  A complete review of systems was performed and found to be negative other than pertinent positives mentioned in history of present illness.      Past medical, social histories reviewed.     Meds- Reviewed.     PHYSICAL EXAMINATION:   VITAL SIGNS:  Blood pressure 144/85, pulse 105, temperature 98.2.   CONSTITUTIONAL:  Sitting on chair, appears tired.   NEUROLOGIC:  Alert, awake.   PSYCHIATRIC:  Appears anxious.      LABORATORY DATA AND IMAGING REVIEWED DURING THIS VISIT:  No results for input(s): NA, POTASSIUM, CHLORIDE, CO2, ANIONGAP, BUN, CR, GLC, PANTERA, MAG, PHOS in the last 75531 hours.  Recent Labs   Lab Test  11/20/18   0925   WBC  10.7   HGB  11.3*   PLT  504*   MCV  89     No results for input(s): BILITOTAL, ALKPHOS, ALT, AST,  ALBUMIN, LDH in the last 50577 hours.      Results for orders placed or performed during the hospital encounter of 11/13/18   PET Oncology Whole Body    Narrative    Combined Report of:    PET and CT on  11/13/2018 8:30 AM :    1. PET of the neck, chest, abdomen, and pelvis.  2. PET CT Fusion for Attenuation Correction and Anatomical  Localization:    3. Diagnostic CT scan of the chest, abdomen, and pelvis with  intravenous contrast for interpretation.  3. CT of the chest, abdomen and pelvis obtained for diagnostic  interpretation.  4. 3D MIP and PET-CT fused images were processed on an independent  workstation and archived to PACS and reviewed by a radiologist.    Technique:  1. PET: The patient received 1.08 mCi of F-18-FDG; the serum glucose  was 104 prior to administration, body weight was 57 kg. Images were  evaluated in the axial, sagittal, and coronal planes as well as the  rotational whole body MIP. Images were acquired from the Vertex to the  Feet.    UPTAKE WAS MEASURED AT 67 MINUTES.     BACKGROUND:  Liver SUV max= 3.8,   Aorta Blood SUV Max: 2.97.     2. CT: Volumetric acquisition for clinical interpretation of the  chest, abdomen, and pelvis acquired at 3 mm sections . The chest,  abdomen, and pelvis were evaluated at 5 mm sections in bone, soft  tissue, and lung windows.      The patient received 77 cc. Of Isovue 370 intravenously for the  examination.      3. 3D MIP and PET-CT fused images were processed on an independent  workstation and archived to PACS and reviewed by a radiologist.    INDICATION: ; 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)    ADDITIONAL INFORMATION OBTAINED FROM EMR: Squamous cell cancer of the  lung     COMPARISON: Chest CT from outside center dated 10/24/2018 and outside  brain MRI 10/31/2018    FINDINGS:     HEAD/NECK: Misregistration of the PET and CT data due to patient  motion in the head and neck.    No abnormal intracranial enhancing lesion. No abnormal FDG  uptake  intracranially within the limits of this study. Periventricular  hypoattenuation secondary to chronic small vessel ischemic disease as  better seen on outside brain MRI. Clear paranasal sinuses and mastoid  air cells. Bilateral normal orbits.    The mucosal pharyngeal space, the , prevertebral and carotid  spaces are within normal limits.     No masses, mass effect or pathologically enlarged lymph nodes are  evident. The thyroid gland is normal.    Regarding the vasculature, bilateral calcified plaques at the carotid  bifurcations and calcified plaques at the aortic arch.     CHEST:     There is a centrally necrotic, peripherally  solid/hypermetabolic/enhancing (max SUV: 17.2, TL , MTV 13.3)  7.3 x 5.2 x 7.6 cm (AP by transverse by craniocaudal) mass in the left   lingular segment extending to mediastinum. Left lingular segmental  branches completely obstructed due to mass and there is associated  atelectasis. This mass invades the adjacent pericardium. 1 cm  pericardial effusion the inferior aspect of the pericardium. There is  a circumferential masslike focus in the superior mediastinum, encasing  the lateral margin of the ascending aorta (1.2 cm in max thickness x  3.4 cm axial length), demonstrating similar imaging characteristics to  the left lingular lesion, between superior vena cava and ascending  aorta (max SUV: 9.2, TL.4, MTV: 11.06), possibly representing a  metastatic deposit within a pericardial recess.  The max thickness on  prior CT is 1.1 cm.  There is a separate FDG avid 1.4 x 0.8 cm focus (max SUV 8.3) along  the right lateral aspect of the SVC, likely extraluminal deposit   (series 4 image 128). This was smaller on prior CT.   No pleural effusion. No consolidation. Few tiny calcified granulomas  less than 2 mm in size in the right lung.  No axillary lymph nodes. Bilateral breast prostheses.    ABDOMEN AND PELVIS:  There is no suspicious FDG uptake in the abdomen or  pelvis.    There are no suspicious hepatic lesions. Cholecystectomy. Prominent  appearance of intra and extrahepatic biliary ducts likely secondary to  cholecystectomy. There is no splenomegaly or evidence for splenic or  pancreatic mass lesion.  There are no suspicious adrenal mass lesions. There is symmetric  nephrographic renal phase without hydronephrosis.    Regarding the abdominal vasculature, there is a large ulcerated plaque  immediately at the origin of the renal arteries. There is a 4 cm long  3.3 cm wide infrarenal abdominal aortic aneurysm.    Age-appropriate uterus. Intact bilateral adnexa.    There is no evidence for diverticulitis, bowel obstruction or free  fluid.    LOWER EXTREMITIES:   No abnormal masses or hypermetabolic lesions.    BONES:   There are no suspicious lytic or blastic osseous lesions.There is no  abnormal FDG uptake in the skeleton. Mild lumbar scoliosis and  degenerative spondylosis. Most notably, there is moderate left neural  foraminal stenosis at L5-S1.       Impression    IMPRESSION:   1a. 7.3 x 5.3 x 7.6 cm centrally necrotic, peripherally  solid/hypermetabolic left upper lobe index mass, involving the  pericardium and extending into the mediastinum. This is compatible  with known diagnosis of pulmonary squamous cell cancer. It is slightly  larger when compared with 10/24/18, previously measuring 6.4 x 5.3 cm.    Ib. Hypermetabolic soft tissue lesion in the right side of the  superior mediastinum, between superior vena cava and ascending aorta,  circumferentially surrounding the ascending aorta suspicious for tumor  deposit. Smaller focus along the right lateral wall of the superior  vena cava. These are more conspicuous than prior CT.  2. No distant metastatic disease elsewhere in the body or  contralateral lung.  3. 3.3 cm Infrarenal abdominal aortic aneurysm.    I have personally reviewed the examination and initial interpretation  and I agree with the findings.    CHINYERE  MD RUKHSANA     ECOG PS: 1     ASSESSMENT:  This is a 78-year-old lady with squamous cell carcinoma, which appears to have originated from the main stem bronchus.  There is evidence of a pericardium-based mass and pericardial effusion.  There is a concern for intraluminal extension into the superior vena cava and a tumor deposit between the ascending aorta and superior vena cava.      Her case was reviewed in our multidisciplinary Thoracic Oncology conference on 11/13/2018 with a concern for M1 disease discussed; however, the consensus was to approach with a curative intent starting with chemotherapy and followed by assessment of response and doing sequential radiation to her chest as well.  This was discussed in detail with the patient.  I discussed the option of using chemotherapy with carboplatin and Taxol every 21 days.  Risks, benefits and the potential side effects were discussed in detail.  The patient asked appropriate questions and is willing to proceed.      Our existing plan is to complete 2-3 cycles of chemotherapy followed by a PET/CT scan.  In the meantime, we will have her see Radiation Oncology and will likely proceed with sequential radiation if she has a good response to treatment.      The patient has relatively better pain control, but still pain control is not optimal.  I will make changes to her pain regimen.  I will also have her see Palliative Care for compromised quality of life.  She is also nauseated, and we will start her on Zofran.      PLAN:   1.  Start OxyContin 10 mg q. 12 hours.  I asked the patient to start 1 pill at night for a few days, and if needed, then go up to twice a day of this pill.   2.  She will continue Percocet, but will use it every 6 hours as-needed basis.   3.  Port placement as scheduled.   4.  Start chemotherapy with carboplatin and Taxol every 21 days.  Chemo education today.   5.  During the first cycle of chemotherapy, she will be seeing a provider every week and  after that hopefully with every cycle.   6.  Zofran 4 mg sublingual every 6 hours as needed for nausea.   7.  Refer to Palliative Care.      cc:   Hesham Graham MD   [facility] Thoracic Surgery    [address] [place] [address]      Donita Pina MD   [facility] Radiation Oncology    [address] [place], [place] [address]         REUBEN JOAQUIN MD             D: 2018   T: 2018   MT: fco      Name:     RUBIN COHEN   MRN:      [id number]        Account:      [id number]   :      1940           Service Date: 2018      Document: [id number]

## 2018-11-20 NOTE — PROCEDURES
Interventional Radiology Brief Post Procedure Note    Procedure: Chest Port Placement    Proceduralist: Anca Mock PA-C    Assistant: None    Time Out: Prior to the start of the procedure and with procedural staff participation, I verbally confirmed the patient s identity using two indicators, relevant allergies, that the procedure was appropriate and matched the consent or emergent situation, and that the correct equipment/implants were available. Immediately prior to starting the procedure I conducted the Time Out with the procedural staff and re-confirmed the patient s name, procedure, and site/side. (The Joint Commission universal protocol was followed.)  Yes        Sedation: Monitored Anesthesia Care (MAC) administered and documented by Anesthesia Care Provider    Findings: Completed image-guided placement of 6 Romanian 25 cm single lumen power-injectable central venous chest port via right IJ. Aspirates and flushes freely, heparin locked and is ready for immediate use.    Estimated Blood Loss: Minimal    Fluoroscopy Time: less than 1 minute(s)    SPECIMENS: None    Complications: 1. None     Condition: Stable    Plan: Port ready for immediate use. Follow up per primary team.     Comments: See dictated procedure note for full details.    Anca Mock PA-C

## 2018-11-20 NOTE — PROGRESS NOTES
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  CT scan 10/24/2018:  A cystic mass involving the left side of the mediastinum extending from the left hilum towards the cardiac apex, abutting the pericardium over a long extent peripherally.  Enhancing soft tissue element as well surrounding atelectatic lingula.  MRA brain 10/31/2018, no brain metastases.  PET/CT scan 11/13/2018 and CT-guided biopsy 11/01/2018 consistent with squamous cell carcinoma.      TREATMENT:  The patient is initiating neoadjuvant treatment with carboplatin and Taxol.  Plan to complete 2-3 cycles followed by a CT scan and a plan for sequential radiation followed by maintenance immune therapy.      SUBJECTIVE:  The patient was seen as a followup today.  She came in with 3 of her family members.  She had some improvement in her left-sided shoulder pain.  She is taking 1 pill of Percocet almost every 6 hours.     REVIEW OF SYSTEMS:  A complete review of systems was performed and found to be negative other than pertinent positives mentioned in history of present illness.      Past medical, social histories reviewed.     Meds- Reviewed.     PHYSICAL EXAMINATION:   VITAL SIGNS:  Blood pressure 144/85, pulse 105, temperature 98.2.   CONSTITUTIONAL:  Sitting on chair, appears tired.   NEUROLOGIC:  Alert, awake.   PSYCHIATRIC:  Appears anxious.      LABORATORY DATA AND IMAGING REVIEWED DURING THIS VISIT:  No results for input(s): NA, POTASSIUM, CHLORIDE, CO2, ANIONGAP, BUN, CR, GLC, PANTERA, MAG, PHOS in the last 60792 hours.  Recent Labs   Lab Test  11/20/18   0925   WBC  10.7   HGB  11.3*   PLT  504*   MCV  89     No results for input(s): BILITOTAL, ALKPHOS, ALT, AST, ALBUMIN, LDH in the last 12769 hours.      Results for orders placed or performed during the hospital encounter of 11/13/18   PET Oncology Whole Body     Narrative    Combined Report of:    PET and CT on  11/13/2018 8:30 AM :    1. PET of the neck, chest, abdomen, and pelvis.  2. PET CT Fusion for Attenuation Correction and Anatomical  Localization:    3. Diagnostic CT scan of the chest, abdomen, and pelvis with  intravenous contrast for interpretation.  3. CT of the chest, abdomen and pelvis obtained for diagnostic  interpretation.  4. 3D MIP and PET-CT fused images were processed on an independent  workstation and archived to PACS and reviewed by a radiologist.    Technique:  1. PET: The patient received 1.08 mCi of F-18-FDG; the serum glucose  was 104 prior to administration, body weight was 57 kg. Images were  evaluated in the axial, sagittal, and coronal planes as well as the  rotational whole body MIP. Images were acquired from the Vertex to the  Feet.    UPTAKE WAS MEASURED AT 67 MINUTES.     BACKGROUND:  Liver SUV max= 3.8,   Aorta Blood SUV Max: 2.97.     2. CT: Volumetric acquisition for clinical interpretation of the  chest, abdomen, and pelvis acquired at 3 mm sections . The chest,  abdomen, and pelvis were evaluated at 5 mm sections in bone, soft  tissue, and lung windows.      The patient received 77 cc. Of Isovue 370 intravenously for the  examination.      3. 3D MIP and PET-CT fused images were processed on an independent  workstation and archived to PACS and reviewed by a radiologist.    INDICATION: ; 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)    ADDITIONAL INFORMATION OBTAINED FROM EMR: Squamous cell cancer of the  lung     COMPARISON: Chest CT from outside center dated 10/24/2018 and outside  brain MRI 10/31/2018    FINDINGS:     HEAD/NECK: Misregistration of the PET and CT data due to patient  motion in the head and neck.    No abnormal intracranial enhancing lesion. No abnormal FDG uptake  intracranially within the limits of this study. Periventricular  hypoattenuation secondary to chronic small vessel ischemic disease as  better seen  on outside brain MRI. Clear paranasal sinuses and mastoid  air cells. Bilateral normal orbits.    The mucosal pharyngeal space, the , prevertebral and carotid  spaces are within normal limits.     No masses, mass effect or pathologically enlarged lymph nodes are  evident. The thyroid gland is normal.    Regarding the vasculature, bilateral calcified plaques at the carotid  bifurcations and calcified plaques at the aortic arch.     CHEST:     There is a centrally necrotic, peripherally  solid/hypermetabolic/enhancing (max SUV: 17.2, TL , MTV 13.3)  7.3 x 5.2 x 7.6 cm (AP by transverse by craniocaudal) mass in the left   lingular segment extending to mediastinum. Left lingular segmental  branches completely obstructed due to mass and there is associated  atelectasis. This mass invades the adjacent pericardium. 1 cm  pericardial effusion the inferior aspect of the pericardium. There is  a circumferential masslike focus in the superior mediastinum, encasing  the lateral margin of the ascending aorta (1.2 cm in max thickness x  3.4 cm axial length), demonstrating similar imaging characteristics to  the left lingular lesion, between superior vena cava and ascending  aorta (max SUV: 9.2, TL.4, MTV: 11.06), possibly representing a  metastatic deposit within a pericardial recess.  The max thickness on  prior CT is 1.1 cm.  There is a separate FDG avid 1.4 x 0.8 cm focus (max SUV 8.3) along  the right lateral aspect of the SVC, likely extraluminal deposit   (series 4 image 128). This was smaller on prior CT.   No pleural effusion. No consolidation. Few tiny calcified granulomas  less than 2 mm in size in the right lung.  No axillary lymph nodes. Bilateral breast prostheses.    ABDOMEN AND PELVIS:  There is no suspicious FDG uptake in the abdomen or pelvis.    There are no suspicious hepatic lesions. Cholecystectomy. Prominent  appearance of intra and extrahepatic biliary ducts likely secondary  to  cholecystectomy. There is no splenomegaly or evidence for splenic or  pancreatic mass lesion.  There are no suspicious adrenal mass lesions. There is symmetric  nephrographic renal phase without hydronephrosis.    Regarding the abdominal vasculature, there is a large ulcerated plaque  immediately at the origin of the renal arteries. There is a 4 cm long  3.3 cm wide infrarenal abdominal aortic aneurysm.    Age-appropriate uterus. Intact bilateral adnexa.    There is no evidence for diverticulitis, bowel obstruction or free  fluid.    LOWER EXTREMITIES:   No abnormal masses or hypermetabolic lesions.    BONES:   There are no suspicious lytic or blastic osseous lesions.There is no  abnormal FDG uptake in the skeleton. Mild lumbar scoliosis and  degenerative spondylosis. Most notably, there is moderate left neural  foraminal stenosis at L5-S1.       Impression    IMPRESSION:   1a. 7.3 x 5.3 x 7.6 cm centrally necrotic, peripherally  solid/hypermetabolic left upper lobe index mass, involving the  pericardium and extending into the mediastinum. This is compatible  with known diagnosis of pulmonary squamous cell cancer. It is slightly  larger when compared with 10/24/18, previously measuring 6.4 x 5.3 cm.    Ib. Hypermetabolic soft tissue lesion in the right side of the  superior mediastinum, between superior vena cava and ascending aorta,  circumferentially surrounding the ascending aorta suspicious for tumor  deposit. Smaller focus along the right lateral wall of the superior  vena cava. These are more conspicuous than prior CT.  2. No distant metastatic disease elsewhere in the body or  contralateral lung.  3. 3.3 cm Infrarenal abdominal aortic aneurysm.    I have personally reviewed the examination and initial interpretation  and I agree with the findings.    CHINYERE MILIAN MD     ECOG PS: 1     ASSESSMENT:  This is a 78-year-old lady with squamous cell carcinoma, which appears to have originated from the main stem  bronchus.  There is evidence of a pericardium-based mass and pericardial effusion.  There is a concern for intraluminal extension into the superior vena cava and a tumor deposit between the ascending aorta and superior vena cava.      Her case was reviewed in our multidisciplinary Thoracic Oncology conference on 11/13/2018 with a concern for M1 disease discussed; however, the consensus was to approach with a curative intent starting with chemotherapy and followed by assessment of response and doing sequential radiation to her chest as well.  This was discussed in detail with the patient.  I discussed the option of using chemotherapy with carboplatin and Taxol every 21 days.  Risks, benefits and the potential side effects were discussed in detail.  The patient asked appropriate questions and is willing to proceed.      Our existing plan is to complete 2-3 cycles of chemotherapy followed by a PET/CT scan.  In the meantime, we will have her see Radiation Oncology and will likely proceed with sequential radiation if she has a good response to treatment.      The patient has relatively better pain control, but still pain control is not optimal.  I will make changes to her pain regimen.  I will also have her see Palliative Care for compromised quality of life.  She is also nauseated, and we will start her on Zofran.      PLAN:   1.  Start OxyContin 10 mg q. 12 hours.  I asked the patient to start 1 pill at night for a few days, and if needed, then go up to twice a day of this pill.   2.  She will continue Percocet, but will use it every 6 hours as-needed basis.   3.  Port placement as scheduled.   4.  Start chemotherapy with carboplatin and Taxol every 21 days.  Chemo education today.   5.  During the first cycle of chemotherapy, she will be seeing a provider every week and after that hopefully with every cycle.   6.  Zofran 4 mg sublingual every 6 hours as needed for nausea.   7.  Refer to Palliative Care.      cc:    Hesham Graham MD   [facility] Thoracic Surgery    [address] [place] [id number]      Donita Pina MD   [facility] Radiation Oncology    [address] [place] [id number]         REUBEN JOAQUIN MD             D: 2018   T: 2018   MT: fco      Name:     RUBIN COHEN   MRN:      [id number]        Account:      [id number]   :      1940           Service Date: 2018      Document: [id number]

## 2018-11-20 NOTE — PATIENT INSTRUCTIONS
1- Start oxycontin 10 mg Q 12 hours, start with one pill at night and then go up to twice daily if needed.   2- Percocet 5/325 every 6 hours as needed  3- Port placement as scheduled  4- Start chemotherapy with Carboplatin and Taxol every 21 days. Chemo education today  5- See NP/MD first day of chemotherapy and then weekly during the first cycle.   6- Zofran 4 mg SL every 6 hours as needed for nausea  7- Refer to palliative care

## 2018-11-20 NOTE — NURSING NOTE
"Oncology Rooming Note    November 20, 2018 8:31 AM   Barbie Deluca is a 78 year old female who presents for:    Chief Complaint   Patient presents with     Oncology Clinic Visit     Lung CA; return visit     Initial Vitals: /85  Pulse 105  Temp 98.2  F (36.8  C) (Oral)  Wt 55.1 kg (121 lb 6.4 oz)  SpO2 94%  BMI 22.2 kg/m2 Estimated body mass index is 22.2 kg/(m^2) as calculated from the following:    Height as of 11/13/18: 1.575 m (5' 2\").    Weight as of this encounter: 55.1 kg (121 lb 6.4 oz). Body surface area is 1.55 meters squared.  Mild Pain (2) Comment: L shoulder    No LMP recorded. Patient is postmenopausal.  Allergies reviewed: Yes  Medications reviewed: Yes    Medications: Medication refills not needed today.  Pharmacy name entered into [employer]: [facility] [address] [place] [address] [address] [facility] [address]    Clinical concerns:      8 minutes for nursing intake (face to face time)     Rosy Rico CMA              "

## 2018-11-20 NOTE — ANESTHESIA POSTPROCEDURE EVALUATION
Anesthesia POST Procedure Evaluation    Patient: Barbie Deluca   MRN:     [id number] Gender:   female   Age:    78 year old :      1940        Preoperative Diagnosis: Malignant Neoplasm of Upper Lobe of Left Lung   Procedure(s):  Chest Port Placement   Postop Comments: No value filed.       Anesthesia Type:  MAC    Reportable Event: NO     PAIN: Uncomplicated   Sign Out status: Comfortable, Well controlled pain     PONV: No PONV   Sign Out status:  No Nausea or Vomiting     Neuro/Psych: Uneventful perioperative course   Sign Out Status: Preoperative baseline; Age appropriate mentation     Airway/Resp.: Uneventful perioperative course   Sign Out Status: Non labored breathing, age appropriate RR; Resp. Status within EXPECTED Parameters     CV: Uneventful perioperative course   Sign Out status: Appropriate BP and perfusion indices; Appropriate HR/Rhythm     Disposition:   Sign Out in:  PACU  Disposition:  Phase II; Home  Recovery Course: Uneventful  Follow-Up: Not required           Last Anesthesia Record Vitals:  CRNA VITALS  2018 1007 - 2018 1107      2018             Pulse: 94    SpO2: 100 %    Resp Rate (set): 10          Last PACU/Preop Vitals:  Vitals:    18 0912 18 1042 18 1057   BP: 135/71 141/74 102/71   Pulse: 98     Resp: 16 16 16   Temp: 36.7  C (98  F) 36.7  C (98.1  F)    SpO2: 90% 93% 95%         Electronically Signed By: Scooby Kay MD, 2018, 11:26 AM

## 2018-11-21 NOTE — MR AVS SNAPSHOT
After Visit Summary   11/21/2018    Barbie Deluca    MRN: [id number]           Patient Information     Date Of Birth          1940        Visit Information        Provider Department      11/21/2018 10:00 AM  10 ATC;  ONCOLOGY INFUSION [facility]        Today's Diagnoses     Non-small cell cancer of left lung (H)    -  1    Encounter for antineoplastic chemotherapy        Encounter for other specified aftercare          Care Instructions    Neulasta injection will start on 11/22 at 5:30 PM, approximately 27 hours after application applied today.    When the dose delivery starts, it will take about 45 minutes to complete.  Neulasta Onpro On-Body should have green flashing light.  Call triage or on-call MD if injector flashes red or appears to be leaking.   Keep Onpro On-Body Neulasta 4 inches away from electrical equipment.  Avoid showering 4 hours prior to injection.       Contact Numbers  [facility]: [phone]    After Hours:  [phone]  Triage: [phone]    Please call the [facility] Triage line if you experience a temperature greater than or equal to 100.5, shaking chills, have uncontrolled nausea, vomiting and/or diarrhea, dizziness, shortness of breath, chest pain, bleeding, unexplained bruising, or if you have any other new/concerning symptoms, questions or concerns.     If it is after hours, weekends, or holidays, please call the main hospital  at  [phone] and ask to speak to the Oncology doctor on call.     If you are having any concerning symptoms or wish to speak to a provider before your next infusion visit, please call your care coordinator or triage to notify them so we can adequately serve you.     If you need a refill on a narcotic prescription or other medication, please call triage before your infusion appointment.         November 2018 Sunday Monday Tuesday Wednesday Thursday Friday Saturday                        1     2     3       4     5     6     [facility] FULL PULMONARY FUNCTION    7:30 AM   (60 min.)   UC PFL B   [facility] Pulmonary Function Testing 7     CARD CARDPUL STRESS TST ADULT    8:30 AM   (120 min.)   UUEKGS   [facility] ELECTROCARDIOLOGY     [facility] NEW    2:15 PM   (30 min.)   Hesham Graham MD   [facility] 8     9     LAB    5:00 PM   (15 min.)   SPECIMEN MGMT   [facility], Lab 10       11     12     13     PE EYE/TH ONCOLOGY    6:15 AM   (45 min.)   UUPET1   [facility] PET CT     UMP NEW    9:45 AM   (60 min.)   Tg Solares MD   [facility] 14     15     16     17       18     19     20     [facility] RETURN    7:45 AM   (30 min.)   Tg Solares MD   [facility]     Outpatient Visit    8:46 AM   [facility]     IR CHEST PORT PLACEMENT >5 YRS    9:00 AM   (75 min.)   [id number]   [facility]     INSERT PORT VASCULAR ACCESS   10:30 AM   Anca Mock PA-C   [facility] OR 21     [facility] [place] DRAW    9:00 AM   (15 min.)   [facility]     [facility] ONC INFUSION 180   10:00 AM   (180 min.)   UC ONCOLOGY INFUSION   [facility] 22     23     24       25     26     27     28     [facility] [place] DRAW    9:00 AM   (15 min.)   [facility] DRAW   [facility] Draw     [facility] RETURN    9:15 AM   (50 min.)   Atiya Houston PA-C   [facility] 29 30 December 2018 Sunday Monday Tuesday Wednesday Thursday Friday Saturday                                 1       2     3     4     5     [facility] [place] DRAW    9:45 AM   (15 min.)   [facility] DRAW   [facility] Draw     [facility] RETURN   10:05 AM   (50 min.)   Atiya Houston PA-C   [facility]     UMP NEW   12:30 PM   (75 min.)   Linus Tolentino MD   [facility] 6     7     8       9     10     11     12     [facility] [place] [facility]  DRAW    9:00 AM   (15 min.)   [facility] LAB DRAW   [facility] Lab Draw     [facility] RETURN    9:15 AM   (50 min.)   Atiya Houston PA-C   [facility]     [facility] ONC INFUSION 180   10:00 AM   (180 min.)    ONCOLOGY INFUSION   [facility] 13     14     15       16     17     18     19     20     21     22       23     24     25     26     27     28     29       30     31                                          Recent Results (from the past 24 hour(s))   CBC with platelets differential    Collection Time: 11/21/18  9:20 AM   Result Value Ref Range    WBC 13.3 (H) 4.0 - 11.0 10e9/L    RBC Count 3.84 3.8 - 5.2 10e12/L    Hemoglobin 11.2 (L) 11.7 - 15.7 g/dL    Hematocrit 34.6 (L) 35.0 - 47.0 %    MCV 90 78 - 100 fl    MCH 29.2 26.5 - 33.0 pg    MCHC 32.4 31.5 - 36.5 g/dL    RDW 13.2 10.0 - 15.0 %    Platelet Count 487 (H) 150 - 450 10e9/L    Diff Method Automated Method     % Neutrophils 84.3 %    % Lymphocytes 5.7 %    % Monocytes 8.8 %    % Eosinophils 0.4 %    % Basophils 0.4 %    % Immature Granulocytes 0.4 %    Nucleated RBCs 0 0 /100    Absolute Neutrophil 11.2 (H) 1.6 - 8.3 10e9/L    Absolute Lymphocytes 0.8 0.8 - 5.3 10e9/L    Absolute Monocytes 1.2 0.0 - 1.3 10e9/L    Absolute Eosinophils 0.1 0.0 - 0.7 10e9/L    Absolute Basophils 0.1 0.0 - 0.2 10e9/L    Abs Immature Granulocytes 0.1 0 - 0.4 10e9/L    Absolute Nucleated RBC 0.0    Comprehensive metabolic panel    Collection Time: 11/21/18  9:20 AM   Result Value Ref Range    Sodium 127 (L) 133 - 144 mmol/L    Potassium 4.6 3.4 - 5.3 mmol/L    Chloride 92 (L) 94 - 109 mmol/L    Carbon Dioxide 24 20 - 32 mmol/L    Anion Gap 11 3 - 14 mmol/L    Glucose 112 (H) 70 - 99 mg/dL    Urea Nitrogen 22 7 - 30 mg/dL    Creatinine 0.76 0.52 - 1.04 mg/dL    GFR Estimate 74 >60 mL/min/1.7m2    GFR Estimate If Black 90 >60 mL/min/1.7m2    Calcium 9.1 8.5 - 10.1 mg/dL    Bilirubin Total 0.6 0.2 - 1.3 mg/dL    Albumin 3.0 (L) 3.4 - 5.0  g/dL    Protein Total 7.7 6.8 - 8.8 g/dL    Alkaline Phosphatase 134 40 - 150 U/L    ALT 34 0 - 50 U/L    AST 30 0 - 45 U/L                 Follow-ups after your visit        Your next 10 appointments already scheduled     Nov 28, 2018  9:00 AM CST   [facility] Draw with [facility] [place] DRAW   [facility] Draw ([facility])    [address] [place] [address]  [address]  [facility] [address]   [phone]            Nov 28, 2018  9:30 AM CST   (Arrive by 9:15 AM)   Return Visit with tAiya Houston PA-C   [facility] ([facility])    [address] [place] [address]  [address]  [facility] [address]   [phone]            Dec 05, 2018  9:45 AM CST   [facility] Draw with [facility] DRAW   [facility] ([facility])    [address] [place] [address]  [address]  [facility] [address]   [phone]            Dec 05, 2018 10:20 AM CST   (Arrive by 10:05 AM)   Return Visit with Atiya Houston PA-C   [facility] ([facility])    [address] [place] [address]  [address]  [facility] [address]   [phone]            Dec 05, 2018 12:45 PM CST   (Arrive by 12:30 PM)   New Patient Visit with Linus Tolentino MD   [facility] ([facility])    [address] [place] [address]  [address]  [facility] [address]   [phone]            Dec 12, 2018  9:00 AM CST   [facility] Draw with [facility] DRAW   [facility] ([facility])    [address] [place] [address]  [address]  [facility] [address]   [phone]            Dec 12, 2018  9:30 AM CST   (Arrive by 9:15 AM)   Return Visit with MARY KATE Christianson [facility] ([facility])    [address] [place] [address]  [address]  [facility] [address]   [phone]            Dec 12, 2018 10:00 AM CST    Infusion 180 with UC ONCOLOGY INFUSION, UC 28 ATC   [facility] ([facility])    [address]  [address]  [facility] [address] [phone]              Who to contact     If you have questions or need follow up information about today's clinic visit or your schedule please contact [facility] [place] directly at [phone].  Normal or non-critical lab and imaging results will be communicated to you by [employer], letter or phone within 4 business days after the clinic has received the results. If you do not hear from us within 7 days, please contact the clinic through [employer] P[employer] or phone. If you have a critical or abnormal lab result, we will notify you by phone as soon as possible.  Submit refill requests through [employer] or call your pharmacy and they will forward the refill request to us. Please allow 3 business days for your refill to be completed.          Additional Information About Your Visit        [employer] Information     [employer] gives you secure access to your electronic health record. If you see a primary care provider, you can also send messages to your care team and make appointments. If you have questions, please call your primary care clinic.  If you do not have a primary care provider, please call [phone] and they will assist you.        Care EveryWhere ID     This is your Care EveryWhere ID. This could be used by other organizations to access your [place] medical records  [id number]        Your Vitals Were     Pulse Temperature Respirations Pulse Oximetry BMI (Body Mass Index)       100 97.5  F (36.4  C) (Oral) 16 93% 22.33 kg/m2        Blood Pressure from Last 3 Encounters:   11/21/18 124/66   11/20/18 102/71   11/20/18 144/85    Weight from Last 3 Encounters:   11/21/18 55.4 kg (122 lb 1.6 oz)   11/20/18 54.9 kg (121 lb)   11/20/18 55.1 kg (121 lb 6.4 oz)              We Performed the Following     CBC with platelets  differential     Comprehensive metabolic panel          Today's Medication Changes          These changes are accurate as of 11/21/18  2:51 PM.  If you have any questions, ask your nurse or doctor.               Start taking these medicines.        Dose/Directions    LORazepam 0.5 MG tablet   Commonly known as:  ATIVAN   Used for:  Non-small cell cancer of left lung (H), Encounter for antineoplastic chemotherapy, Encounter for other specified aftercare        Dose:  0.5 mg   Take 1 tablet (0.5 mg) by mouth every 4 hours as needed (Anxiety, Nausea/Vomiting or Sleep)   Quantity:  30 tablet   Refills:  2       prochlorperazine 10 MG tablet   Commonly known as:  COMPAZINE   Used for:  Non-small cell cancer of left lung (H), Encounter for antineoplastic chemotherapy, Encounter for other specified aftercare        Dose:  10 mg   Take 1 tablet (10 mg) by mouth every 6 hours as needed (Nausea/Vomiting)   Quantity:  30 tablet   Refills:  2         These medicines have changed or have updated prescriptions.        Dose/Directions    * ZOFRAN 4 MG tablet   This may have changed:  Another medication with the same name was added. Make sure you understand how and when to take each.   Generic drug:  ondansetron        Dose:  4 mg   Take 4 mg by mouth   Refills:  0       * ondansetron 8 MG tablet   Commonly known as:  ZOFRAN   This may have changed:  You were already taking a medication with the same name, and this prescription was added. Make sure you understand how and when to take each.   Used for:  Non-small cell cancer of left lung (H), Encounter for antineoplastic chemotherapy, Encounter for other specified aftercare        Dose:  8 mg   Take 1 tablet (8 mg) by mouth every 8 hours as needed (Nausea/Vomiting)   Quantity:  10 tablet   Refills:  2       * Notice:  This list has 2 medication(s) that are the same as other medications prescribed for you. Read the directions carefully, and ask your doctor or other care provider to  review them with you.         Where to get your medicines      These medications were sent to [place], [place] - [address]  [address], [facility] [address]    Hours:  TRANSPLANT PHONE NUMBER [phone] Phone:  [phone]     ondansetron 8 MG tablet    prochlorperazine 10 MG tablet         Some of these will need a paper prescription and others can be bought over the counter.  Ask your nurse if you have questions.     Bring a paper prescription for each of these medications     LORazepam 0.5 MG tablet                Primary Care Provider Office Phone # Fax #    Erik Reaves [phone]       [facility] [place] [address] [place] [facility] [address]        Equal Access to Services     MARCY MÁRQUEZ : Elena martinso Sodasha, waaxda luqadaha, qaybta kaalmada adeegyada, gita starr. So [facility] [phone].    ATENCIÓN: Si habla español, tiene a alarcon disposición servicios gratuitos de asistencia lingüística. Loida al [phone].    We comply with applicable federal civil rights laws and [place] laws. We do not discriminate on the basis of race, color, national origin, age, disability, sex, sexual orientation, or gender identity.            Thank you!     Thank you for choosing [facility]  for your care. Our goal is always to provide you with excellent care. Hearing back from our patients is one way we can continue to improve our services. Please take a few minutes to complete the written survey that you may receive in the mail after your visit with us. Thank you!             Your Updated Medication List - Protect others around you: Learn how to safely use, store and throw away your medicines at www.disposemymeds.org.          This list is accurate as of 11/21/18  2:51 PM.  Always use your most recent med list.                   Brand Name Dispense Instructions  for use Diagnosis    acetaminophen 500 MG tablet    TYLENOL     Take 500-1,000 mg by mouth        GAS-X PO      Take by mouth 4 times daily as needed for intestinal gas        LORazepam 0.5 MG tablet    ATIVAN    30 tablet    Take 1 tablet (0.5 mg) by mouth every 4 hours as needed (Anxiety, Nausea/Vomiting or Sleep)    Non-small cell cancer of left lung (H), Encounter for antineoplastic chemotherapy, Encounter for other specified aftercare       losartan 50 MG tablet    COZAAR     TK 1 T PO D        ondansetron 4 MG ODT tab    ZOFRAN-ODT    50 tablet    Take 1 tablet (4 mg) by mouth every 6 hours as needed for nausea    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), Non-small cell cancer of left lung (H), Other constipation       oxyCODONE 10 MG 12 hr tablet    oxyCONTIN    60 tablet    Take 1 tablet (10 mg) by mouth every 12 hours maximum 2 tablet(s) per day    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), Non-small cell cancer of left lung (H), Other constipation       oxyCODONE-acetaminophen 5-325 MG tablet    PERCOCET    50 tablet    Take 1 tablet by mouth every 6 hours as needed for severe pain    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), Non-small cell cancer of left lung (H), Other constipation       PEPCID 20 MG tablet   Generic drug:  famotidine      Take 20 mg by mouth        polyethylene glycol Packet    MIRALAX/GLYCOLAX    7 packet    Take 17 g by mouth daily    Non-small cell cancer of left lung (H), 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), Other constipation       prochlorperazine 10 MG tablet    COMPAZINE    30 tablet    Take 1 tablet (10 mg) by mouth every 6 hours as needed (Nausea/Vomiting)    Non-small cell cancer of left lung (H), Encounter for antineoplastic chemotherapy, Encounter for other specified aftercare       senna-docusate 8.6-50 MG per tablet    SENOKOT-S;PERICOLACE    120 tablet    Take 2 tablets by mouth 2 times daily    Non-small cell cancer of left lung  (H), 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), Other constipation       * ZOFRAN 4 MG tablet   Generic drug:  ondansetron      Take 4 mg by mouth        * ondansetron 8 MG tablet    ZOFRAN    10 tablet    Take 1 tablet (8 mg) by mouth every 8 hours as needed (Nausea/Vomiting)    Non-small cell cancer of left lung (H), Encounter for antineoplastic chemotherapy, Encounter for other specified aftercare       * Notice:  This list has 2 medication(s) that are the same as other medications prescribed for you. Read the directions carefully, and ask your doctor or other care provider to review them with you.

## 2018-11-21 NOTE — PROGRESS NOTES
"  Infusion Nursing Note:  Barbie Deluca presents today for C1 Taxol-Carboplatin-Onbody Neulasta.      Patient seen by provider today: Yes: Dr Solares, 11/20    Treatment Conditions:  Lab Results   Component Value Date    HGB 11.2 11/21/2018     Lab Results   Component Value Date    WBC 13.3 11/21/2018      Lab Results   Component Value Date    ANEU 11.2 11/21/2018     Lab Results   Component Value Date     11/21/2018      Lab Results   Component Value Date     11/21/2018                   Lab Results   Component Value Date    POTASSIUM 4.6 11/21/2018           No results found for: MAG         Lab Results   Component Value Date    CR 0.76 11/21/2018                   Lab Results   Component Value Date    PANTERA 9.1 11/21/2018                Lab Results   Component Value Date    BILITOTAL 0.6 11/21/2018           Lab Results   Component Value Date    ALBUMIN 3.0 11/21/2018                    Lab Results   Component Value Date    ALT 34 11/21/2018           Lab Results   Component Value Date    AST 30 11/21/2018       Results reviewed, labs MET treatment parameters, ok to proceed with treatment.    Intravenous Access:  Implanted Port.  Access dc'd at time of discharge.      Note:   TORB 11/21/18 @ 1000 Dr Solares-Harriet Lawrence, RN  --1 L NS IV for NA:127  Pt reporting no BM x 5 days.  Pt has not been eating or drinking \"very much\".  Dr Solares will order Miralax and Senokot-S for take home for pt.  PT verbalizes understanding to notify MD if no improvement in the next day.  Results reviewed, copy given to patient.  Proceed with treatment.    Taxol titrated.  Copy of AVS given to patient. + Blood return from PORT pre and post infusion.    Neulasta Onpro On-Body injector applied to R arm at 1430.  Writer discussed Neulasta injection would start on 11/22 at 1730, approximately 27 hours after application applied today.  Written and Verbal instruction reviewed with patient.  Pt instructed when the dose " delivery starts, it will take about 45 minutes to complete.  Pt aware Neulasta Onpro On-Body should have green flashing light and to call triage or on-call MD if injector flashes red or appears to be leaking. Pt aware to keep Onpro On-Body Neulasta 4 inches away from electrical equipment and to avoid showering 4 hours prior to injection.   Neulasta Onpro Lot number: [id number]    Tolerated infusion without incident.  Prescriptions filled today.   D/C in care of daughter.  Pt will return 11/28 to see BLAS Cortez.       Harriet Lawrence RN

## 2018-11-21 NOTE — PROGRESS NOTES
[id number]: Informed Consent Note     The patient was provided with a copy of the IRB-approved consent form. The form was reviewed, and all questions were answered before the patient signed the consent form. A copy of the signed form was provided to the patient. No procedures specific to this study were performed prior to the patient signing the consent form.  The specific study procedures that were completed today were:  Questionnaire: Completed on [facility] [place] database  Urine collection was collected at:   Brushing of the teeth occurred at: 9:13A  Oral Rinse was collected at: 9:28A  Buccal Swabs were collected at: R) 9:42A  L) 9:42A  Baseline Blood collected at: 9:15A    The patient will be bringing back the toenail collection next visit.  All samples are being processed and stored by [facility].     Consent Version Date: [id number]  Consent obtained by: Sharon Kramer    Date: 21NOV2018  HIPAA authorization signed?: yes  HIPAA authorization version date: 08JULY2016  IRB #: [id number]    Sharon Kramer    :    Darline Crocker  Pager: [phone]  Clinical Research Coordinator:  Sharon Kramer  Phone: [phone]  Pager: [phone]

## 2018-11-21 NOTE — TELEPHONE ENCOUNTER
Prior Authorization Approval    Authorization Effective Date: 10/22/2018  Authorization Expiration Date: 11/21/2019  Medication: Lorazepam-PA Approved, 0.5mg #30/5ds  Approved Dose/Quantity: 30/5ds  Reference #: [id number]   Insurance Company: [employer] - Phone [phone] Fax [phone]  Expected CoPay:     $6.56  CoPay Card Available: No   Foundation Assistance Needed: n/a  Which Pharmacy is filling the prescription (Not needed for infusion/clinic administered): [facility] [place], [place] - [address] [place] [address] [phone]  Pharmacy Notified: Yes  Patient Notified: Yes

## 2018-11-21 NOTE — NURSING NOTE
"Chief Complaint   Patient presents with     Port Draw     port accessed and labs drawn by rn.  vs taken.     Port accessed with 20g 3/4\" power needle, labs drawn, port flushed with saline and heparin, vitals checked, pt checked in for next appointment.  Izzy Nelson RN    "

## 2018-11-21 NOTE — PATIENT INSTRUCTIONS
Neulasta injection will start on 11/22 at 5:30 PM, approximately 27 hours after application applied today.    When the dose delivery starts, it will take about 45 minutes to complete.  Neulasta Onpro On-Body should have green flashing light.  Call triage or on-call MD if injector flashes red or appears to be leaking.   Keep Onpro On-Body Neulasta 4 inches away from electrical equipment.  Avoid showering 4 hours prior to injection.       Contact Numbers  [facility]: [phone]    After Hours:  [phone]  Triage: [phone]    Please call the [facility] Triage line if you experience a temperature greater than or equal to 100.5, shaking chills, have uncontrolled nausea, vomiting and/or diarrhea, dizziness, shortness of breath, chest pain, bleeding, unexplained bruising, or if you have any other new/concerning symptoms, questions or concerns.     If it is after hours, weekends, or holidays, please call the main hospital  at  [phone] and ask to speak to the Oncology doctor on call.     If you are having any concerning symptoms or wish to speak to a provider before your next infusion visit, please call your care coordinator or triage to notify them so we can adequately serve you.     If you need a refill on a narcotic prescription or other medication, please call triage before your infusion appointment.         November 2018 Sunday Monday Tuesday Wednesday Thursday Friday Saturday                       1     2     3       4     5     6     [facility] FULL PULMONARY FUNCTION    7:30 AM   (60 min.)   [facility] PFL B   [facility] Function Testing 7     CARD CARDPUL STRESS TST ADULT    8:30 AM   (120 min.)   UUEKGS   [facility] ELECTROCARDIOLOGY     [facility] NEW    2:15 PM   (30 min.)   Hesham Graham MD   [facility] 8     9     LAB    5:00 PM   (15 min.)   SPECIMEN MGMT   [facility], Lab 10       11     12     13     PE [facility]/ ONCOLOGY    6:15 AM   (45 min.)   UUPET1   [facility]  [place] PET CT     [facility] NEW    9:45 AM   (60 min.)   Tg Solares MD   [facility] 14     15     16     17       18     19     20     [facility] RETURN    7:45 AM   (30 min.)   Tg Solares MD   [facility]     Outpatient Visit    8:46 AM   [facility]     IR CHEST PORT PLACEMENT >5 YRS    9:00 AM   (75 min.)   [id number]   [facility] ASC Imaging     INSERT PORT VASCULAR ACCESS   10:30 AM   Anca Mock PA-C   [facility] OR 21     [facility] DRAW    9:00 AM   (15 min.)   [facility] DRAW   [facility] Lab Draw     [facility] ONC INFUSION 180   10:00 AM   (180 min.)   UC ONCOLOGY INFUSION   [facility] 22     23     24       25     26     27     28     [facility] DRAW    9:00 AM   (15 min.)   [facility]     [facility] RETURN    9:15 AM   (50 min.)   Atiya Houston PA-C   [facility] 29 30 December 2018 Sunday Monday Tuesday Wednesday Thursday Friday Saturday                                 1       2     3     4     5     [facility] DRAW    9:45 AM   (15 min.)   [facility] DRAW   [facility] Draw     [facility] RETURN   10:05 AM   (50 min.)   Atiya Houston PA-C   [facility]     [facility] NEW   12:30 PM   (75 min.)   Linus Tolentino MD   [facility] 6     7     8       9     10     11     12     [facility] DRAW    9:00 AM   (15 min.)   [facility]     [facility] RETURN    9:15 AM   (50 min.)   Atiya Houston PA-C   [facility]     [facility] ONC INFUSION 180   10:00 AM   (180 min.)   UC ONCOLOGY INFUSION   [facility] 13     14     15       16     17     18     19     20     21     22       23     24     25     26     27     28     29       30     31                                          Recent Results (from the past 24  hour(s))   CBC with platelets differential    Collection Time: 11/21/18  9:20 AM   Result Value Ref Range    WBC 13.3 (H) 4.0 - 11.0 10e9/L    RBC Count 3.84 3.8 - 5.2 10e12/L    Hemoglobin 11.2 (L) 11.7 - 15.7 g/dL    Hematocrit 34.6 (L) 35.0 - 47.0 %    MCV 90 78 - 100 fl    MCH 29.2 26.5 - 33.0 pg    MCHC 32.4 31.5 - 36.5 g/dL    RDW 13.2 10.0 - 15.0 %    Platelet Count 487 (H) 150 - 450 10e9/L    Diff Method Automated Method     % Neutrophils 84.3 %    % Lymphocytes 5.7 %    % Monocytes 8.8 %    % Eosinophils 0.4 %    % Basophils 0.4 %    % Immature Granulocytes 0.4 %    Nucleated RBCs 0 0 /100    Absolute Neutrophil 11.2 (H) 1.6 - 8.3 10e9/L    Absolute Lymphocytes 0.8 0.8 - 5.3 10e9/L    Absolute Monocytes 1.2 0.0 - 1.3 10e9/L    Absolute Eosinophils 0.1 0.0 - 0.7 10e9/L    Absolute Basophils 0.1 0.0 - 0.2 10e9/L    Abs Immature Granulocytes 0.1 0 - 0.4 10e9/L    Absolute Nucleated RBC 0.0    Comprehensive metabolic panel    Collection Time: 11/21/18  9:20 AM   Result Value Ref Range    Sodium 127 (L) 133 - 144 mmol/L    Potassium 4.6 3.4 - 5.3 mmol/L    Chloride 92 (L) 94 - 109 mmol/L    Carbon Dioxide 24 20 - 32 mmol/L    Anion Gap 11 3 - 14 mmol/L    Glucose 112 (H) 70 - 99 mg/dL    Urea Nitrogen 22 7 - 30 mg/dL    Creatinine 0.76 0.52 - 1.04 mg/dL    GFR Estimate 74 >60 mL/min/1.7m2    GFR Estimate If Black 90 >60 mL/min/1.7m2    Calcium 9.1 8.5 - 10.1 mg/dL    Bilirubin Total 0.6 0.2 - 1.3 mg/dL    Albumin 3.0 (L) 3.4 - 5.0 g/dL    Protein Total 7.7 6.8 - 8.8 g/dL    Alkaline Phosphatase 134 40 - 150 U/L    ALT 34 0 - 50 U/L    AST 30 0 - 45 U/L       Atiya

## 2018-11-28 NOTE — MR AVS SNAPSHOT
After Visit Summary   11/28/2018    Barbie Deluca    MRN: [id number]           Patient Information     Date Of Birth          1940        Visit Information        Provider Department      11/28/2018 9:30 AM Atiya Houston PA-C formerly [facility]        Today's Diagnoses     Non-small cell cancer of left lung (H)    -  1    Anorexia        Nausea          Care Instructions    Try Gas-X (simethicone) for gas pains.   Try Benecalorie 2-3 per day for increased calorie/protein intake.   Start Marinol 2.5 mg twice/day before two biggest meals of the day for appetite stimulation.   Try increasing fluid intake to 48-64 oz per day.           Follow-ups after your visit        Your next 10 appointments already scheduled     Dec 12, 2018  9:00 AM CST   [facility] Draw with [facility] DRAW   [facility] Draw ([facility])    [address] [place] [address]  [address]  [facility] [id number] [phone]            Dec 12, 2018  9:30 AM CST   (Arrive by 9:15 AM)   Return Visit with Atiya Houston PA-C   [facility] ([facility])    [address] [place] [address]  [address]  [facility] [address] [phone]            Dec 12, 2018 10:00 AM CST   Infusion 180 with  ONCOLOGY INFUSION,  28 ATC   [facility] ([facility])    [address] [place] [address]  [address]  [facility] [id number] [phone]              Who to contact     If you have questions or need follow up information about today's clinic visit or your schedule please contact [facility] directly at [phone].  Normal or non-critical lab and imaging results will be communicated to you by MyChart, letter or phone within 4 business days after the clinic has received the results. If you do not hear from us within 7 days, please contact the clinic through MyChart or phone. If you have  "a critical or abnormal lab result, we will notify you by phone as soon as possible.  Submit refill requests through [employer] or call your pharmacy and they will forward the refill request to us. Please allow 3 business days for your refill to be completed.          Additional Information About Your Visit        [employer] Information     [employer] gives you secure access to your electronic health record. If you see a primary care provider, you can also send messages to your care team and make appointments. If you have questions, please call your primary care clinic.  If you do not have a primary care provider, please call [phone] and they will assist you.        Care EveryWhere ID     This is your Care EveryWhere ID. This could be used by other organizations to access your [facility] medical records  [id number]        Your Vitals Were     Pulse Temperature Respirations Height Pulse Oximetry BMI (Body Mass Index)    104 98.9  F (37.2  C) (Oral) 16 1.575 m (5' 2\") 91% 22.66 kg/m2       Blood Pressure from Last 3 Encounters:   12/07/18 128/80   12/05/18 117/75   11/28/18 129/79    Weight from Last 3 Encounters:   12/06/18 58.3 kg (128 lb 8 oz)   12/05/18 55.4 kg (122 lb 1.6 oz)   11/28/18 56.2 kg (123 lb 14.4 oz)              We Performed the Following     *CBC with platelets differential     Comprehensive metabolic panel          Today's Medication Changes          These changes are accurate as of 11/28/18 11:59 PM.  If you have any questions, ask your nurse or doctor.               Start taking these medicines.        Dose/Directions    dronabinol 2.5 MG capsule   Commonly known as:  MARINOL   Used for:  Non-small cell cancer of left lung (H), Anorexia   Started by:  Atiya Houston PA-C        Dose:  2.5 mg   Take 1 capsule (2.5 mg) by mouth 2 times daily (before meals)   Quantity:  60 capsule   Refills:  0         These medicines have changed or have updated prescriptions.        Dose/Directions    * " ondansetron 8 MG tablet   Commonly known as:  ZOFRAN   This may have changed:  Another medication with the same name was added. Make sure you understand how and when to take each.   Used for:  Non-small cell cancer of left lung (H), Encounter for antineoplastic chemotherapy, Encounter for other specified aftercare   Changed by:  Atiya Houston PA-C        Dose:  8 mg   Take 1 tablet (8 mg) by mouth every 8 hours as needed (Nausea/Vomiting)   Quantity:  10 tablet   Refills:  2       * ondansetron 8 MG tablet   Commonly known as:  ZOFRAN   This may have changed:  You were already taking a medication with the same name, and this prescription was added. Make sure you understand how and when to take each.   Used for:  Non-small cell cancer of left lung (H), Nausea   Changed by:  Atiya Houston PA-C        Dose:  8 mg   Take 1 tablet (8 mg) by mouth every 8 hours as needed for nausea   Quantity:  60 tablet   Refills:  3       * Notice:  This list has 2 medication(s) that are the same as other medications prescribed for you. Read the directions carefully, and ask your doctor or other care provider to review them with you.         Where to get your medicines      These medications were sent to [address] [address]    Hours:  TRANSPLANT PHONE NUMBER [phone] Phone:  [phone]     ondansetron 8 MG tablet         Some of these will need a paper prescription and others can be bought over the counter.  Ask your nurse if you have questions.     Bring a paper prescription for each of these medications     dronabinol 2.5 MG capsule                Primary Care Provider Office Phone # Fax #    Erik Reaves [phone]       [facility] [address] [place] [facility] [address]        Equal Access to Services     MARCY MÁRQUEZ AH: therese Marcelo  kris ricardo oneliagita mcmillan ah. So [facility] [phone].    ATENCIÓN: Si glynn bonner, tiene a alarcon disposición servicios gratuitos de asistencia lingüística. Loida al [phone].    We comply with applicable federal civil rights laws and [place] laws. We do not discriminate on the basis of race, color, national origin, age, disability, sex, sexual orientation, or gender identity.            Thank you!     Thank you for choosing [facility]  for your care. Our goal is always to provide you with excellent care. Hearing back from our patients is one way we can continue to improve our services. Please take a few minutes to complete the written survey that you may receive in the mail after your visit with us. Thank you!             Your Updated Medication List - Protect others around you: Learn how to safely use, store and throw away your medicines at www.disposemymeds.org.          This list is accurate as of 11/28/18 11:59 PM.  Always use your most recent med list.                   Brand Name Dispense Instructions for use Diagnosis    dronabinol 2.5 MG capsule    MARINOL    60 capsule    Take 1 capsule (2.5 mg) by mouth 2 times daily (before meals)    Non-small cell cancer of left lung (H), Anorexia       LORazepam 0.5 MG tablet    ATIVAN    30 tablet    Take 1 tablet (0.5 mg) by mouth every 4 hours as needed (Anxiety, Nausea/Vomiting or Sleep)    Non-small cell cancer of left lung (H), Encounter for antineoplastic chemotherapy, Encounter for other specified aftercare       losartan 50 MG tablet    COZAAR     take 1 tablet by mouth daily        * ondansetron 8 MG tablet    ZOFRAN    10 tablet    Take 1 tablet (8 mg) by mouth every 8 hours as needed (Nausea/Vomiting)    Non-small cell cancer of left lung (H), Encounter for antineoplastic chemotherapy, Encounter for other specified aftercare       * ondansetron 8 MG tablet    ZOFRAN    60 tablet    Take  1 tablet (8 mg) by mouth every 8 hours as needed for nausea    Non-small cell cancer of left lung (H), Nausea       oxyCODONE 10 MG 12 hr tablet    oxyCONTIN    60 tablet    Take 1 tablet (10 mg) by mouth every 12 hours maximum 2 tablet(s) per day    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), Non-small cell cancer of left lung (H), Other constipation       oxyCODONE-acetaminophen 5-325 MG tablet    PERCOCET    50 tablet    Take 1 tablet by mouth every 6 hours as needed for severe pain    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), Non-small cell cancer of left lung (H), Other constipation       polyethylene glycol packet    MIRALAX/GLYCOLAX    7 packet    Take 17 g by mouth daily    Non-small cell cancer of left lung (H), 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), Other constipation       prochlorperazine 10 MG tablet    COMPAZINE    30 tablet    Take 1 tablet (10 mg) by mouth every 6 hours as needed (Nausea/Vomiting)    Non-small cell cancer of left lung (H), Encounter for antineoplastic chemotherapy, Encounter for other specified aftercare       senna-docusate 8.6-50 MG tablet    SENOKOT-S/PERICOLACE    120 tablet    Take 2 tablets by mouth 2 times daily    Non-small cell cancer of left lung (H), 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), Other constipation       * Notice:  This list has 2 medication(s) that are the same as other medications prescribed for you. Read the directions carefully, and ask your doctor or other care provider to review them with you.

## 2018-11-28 NOTE — NURSING NOTE
Chief Complaint   Patient presents with     Port Draw     [facility] labs drawn from port by RN. Line flushed with saline and heparin. IB sent to [facility] 11/27. Message sent as a reminder to place orders and notify lab. vs taken - HR elevated, O2 low 90s. Left port accessed in case needs add'l labs drawn.     [facility] labs drawn from port by RN. Line flushed with saline and heparin. IB sent to [facility] 11/27. Message sent as a reminder to place orders and notify lab. vs taken - HR elevated, O2 low 90s. Left port accessed in case needs add'l labs drawn    Jacinda Cruz RN

## 2018-11-28 NOTE — NURSING NOTE
Provider order received to administer NS x1 liter to this patient following provider visit.  Pt reports understanding of rationale for this add-on infusion today.  PT reports poor oral intake at home.  NS x1 liter infused, via CVC, over 1.5 hours.  PT tolerated well.  Port flushed with heparin and needle de-accessed prior to discharge.  PT discharged, via wheelchair transport, accompanied by female cargiver.  PT remains alert and oriented to plan of care and current surroundings.

## 2018-11-28 NOTE — Clinical Note
11/28/2018       RE: Barbie Deluca  [address] [facility] [address]     Dear Colleague,    Thank you for referring your patient, Barbie Deluca, to the [facility]. Please see a copy of my visit note below.    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  CT scan 10/24/2018:  A cystic mass involving the left side of the mediastinum extending from the left hilum towards the cardiac apex, abutting the pericardium over a long extent peripherally.  Enhancing soft tissue element as well surrounding atelectatic lingula.  MRA brain 10/31/2018, no brain metastases.  PET/CT scan 11/13/2018 and CT-guided biopsy 11/01/2018 consistent with squamous cell carcinoma.      TREATMENT:  The patient is initiating neoadjuvant treatment with carboplatin and Taxol.  Plan to complete 2-3 cycles followed by a CT scan and a plan for sequential radiation followed by maintenance immune therapy.      SUBJECTIVE:      REVIEW OF SYSTEMS:  A complete review of systems was performed and found to be negative other than pertinent positives mentioned in history of present illness.      Past medical, social histories reviewed.     Meds  Current Outpatient Prescriptions   Medication Sig Dispense Refill     LORazepam (ATIVAN) 0.5 MG tablet Take 1 tablet (0.5 mg) by mouth every 4 hours as needed (Anxiety, Nausea/Vomiting or Sleep) 30 tablet 2     losartan (COZAAR) 50 MG tablet TK 1 T PO D  11     ondansetron (ZOFRAN) 8 MG tablet Take 1 tablet (8 mg) by mouth every 8 hours as needed (Nausea/Vomiting) 10 tablet 2     oxyCODONE (OXYCONTIN) 10 MG 12 hr tablet Take 1 tablet (10 mg) by mouth every 12 hours maximum 2 tablet(s) per day 60 tablet 0     oxyCODONE-acetaminophen (PERCOCET) 5-325 MG per tablet Take 1 tablet by mouth every 6 hours as needed for severe pain 50  tablet 0     polyethylene glycol (MIRALAX/GLYCOLAX) Packet Take 17 g by mouth daily 7 packet 3     prochlorperazine (COMPAZINE) 10 MG tablet Take 1 tablet (10 mg) by mouth every 6 hours as needed (Nausea/Vomiting) 30 tablet 2     senna-docusate (SENOKOT-S;PERICOLACE) 8.6-50 MG per tablet Take 2 tablets by mouth 2 times daily 120 tablet 1        PHYSICAL EXAMINATION:   General: The patient is a pleasant female in no acute distress.  There were no vitals taken for this visit.  Wt Readings from Last 10 Encounters:   11/21/18 55.4 kg (122 lb 1.6 oz)   11/20/18 54.9 kg (121 lb)   11/20/18 55.1 kg (121 lb 6.4 oz)   11/13/18 56.5 kg (124 lb 9 oz)   11/07/18 57 kg (125 lb 11.2 oz)     HEENT: EOMI, PERRL. Sclerae are anicteric. Oral mucosa is pink and moist with no lesions or thrush.   Lymph: Neck is supple with no lymphadenopathy in the cervical or supraclavicular areas.   Heart: Regular rate and rhythm.   Lungs: Clear to auscultation bilaterally.   Abdomen: Bowel sounds present, soft, nontender with no palpable hepatosplenomegaly or masses.   Extremities: No lower extremity edema noted bilaterally.   Neuro: Cranial nerves II through XII are grossly intact.  Skin: No rashes, petechiae, or bruising noted on exposed skin.     LABORATORY DATA AND IMAGING REVIEWED DURING THIS VISIT:    ECOG PS: 1     ASSESSMENT:  This is a 78-year-old lady with squamous cell carcinoma, which appears to have originated from the main stem bronchus.  There is evidence of a pericardium-based mass and pericardial effusion.  There is a concern for intraluminal extension into the superior vena cava and a tumor deposit between the ascending aorta and superior vena cava.      Her case was reviewed in our multidisciplinary Thoracic Oncology conference on 11/13/2018 with a concern for M1 disease discussed; however, the consensus was to approach with a curative intent starting with chemotherapy and followed by assessment of response and doing sequential  radiation to her chest as well.  This was discussed in detail with the patient.  I discussed the option of using chemotherapy with carboplatin and Taxol every 21 days.  Risks, benefits and the potential side effects were discussed in detail.  The patient asked appropriate questions and is willing to proceed.      Our existing plan is to complete 2-3 cycles of chemotherapy followed by a PET/CT scan.  In the meantime, we will have her see Radiation Oncology and will likely proceed with sequential radiation if she has a good response to treatment.      The patient has relatively better pain control, but still pain control is not optimal.  I will make changes to her pain regimen.  I will also have her see Palliative Care for compromised quality of life.  She is also nauseated, and we will start her on Zofran.      PLAN:   1.  Start OxyContin 10 mg q. 12 hours.  I asked the patient to start 1 pill at night for a few days, and if needed, then go up to twice a day of this pill.   2.  She will continue Percocet, but will use it every 6 hours as-needed basis.   3.  Port placement as scheduled.   4.  Start chemotherapy with carboplatin and Taxol every 21 days.  Chemo education today.   5.  During the first cycle of chemotherapy, she will be seeing a provider every week and after that hopefully with every cycle.   6.  Zofran 4 mg sublingual every 6 hours as needed for nausea.   7.  Refer to Palliative Care.      Atiya Houston PA-C  [facility]  [address] [place] [address] [phone]      Again, thank you for allowing me to participate in the care of your patient.      Sincerely,    Atiya Houston PA-C

## 2018-11-28 NOTE — LETTER
11/28/2018      RE: Barbie Deluca  [address] [facility] [address]       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  CT scan 10/24/2018:  A cystic mass involving the left side of the mediastinum extending from the left hilum towards the cardiac apex, abutting the pericardium over a long extent peripherally.  Enhancing soft tissue element as well surrounding atelectatic lingula.  MRA brain 10/31/2018, no brain metastases.  PET/CT scan 11/13/2018 and CT-guided biopsy 11/01/2018 consistent with squamous cell carcinoma.      TREATMENT:  The patient is initiated neoadjuvant treatment with carboplatin and Taxol.  Plan to complete 2-3 cycles followed by a CT scan and a plan for sequential radiation followed by maintenance immune therapy. Cycle 1 carboplatin and Taxol given on 11/21/18.      SUBJECTIVE:    Patient reports that her first 2 days following chemotherapy went fairly well.  On day 3, she had an increase of in her chest pain after receiving Neulasta.  She feels this is improving.  She was taking OxyContin once a day and just increased it to twice a day yesterday.  She is taking about 4 Percocet per day.  She reports that her pain is in her left shoulder, jaw, and neck.  She feels the pain is slightly better.  She reports nausea, but very little vomiting.  She reports the nausea comes in waves.  She reports that her eating is down due to poor taste.  She is not taking any MiraLAX, but is using 2 senna S twice a day and having bowel movements every 3-4 days.  She finds that Ensure, Boost, and [place] instant breakfast are all too sweet for her.  She is sleeping okay with the use of Ativan.  She does have some abdominal gas in the evenings.  She has not been checking her blood pressure at home.  She reports that her fluid intake is fair.  She is  "getting up to 50 ounces of fluid per day.  She denies other concerns.    Meds  Current Outpatient Prescriptions   Medication Sig Dispense Refill     dronabinol (MARINOL) 2.5 MG capsule Take 1 capsule (2.5 mg) by mouth 2 times daily (before meals) (Patient not taking: Reported on 12/5/2018) 60 capsule 0     LORazepam (ATIVAN) 0.5 MG tablet Take 1 tablet (0.5 mg) by mouth every 4 hours as needed (Anxiety, Nausea/Vomiting or Sleep) 30 tablet 2     losartan (COZAAR) 50 MG tablet TK 1 T PO D  11     ondansetron (ZOFRAN) 8 MG tablet Take 1 tablet (8 mg) by mouth every 8 hours as needed for nausea 60 tablet 3     ondansetron (ZOFRAN) 8 MG tablet Take 1 tablet (8 mg) by mouth every 8 hours as needed (Nausea/Vomiting) 10 tablet 2     oxyCODONE (OXYCONTIN) 10 MG 12 hr tablet Take 1 tablet (10 mg) by mouth every 12 hours maximum 2 tablet(s) per day 60 tablet 0     oxyCODONE-acetaminophen (PERCOCET) 5-325 MG per tablet Take 1 tablet by mouth every 6 hours as needed for severe pain 50 tablet 0     polyethylene glycol (MIRALAX/GLYCOLAX) Packet Take 17 g by mouth daily 7 packet 3     prochlorperazine (COMPAZINE) 10 MG tablet Take 1 tablet (10 mg) by mouth every 6 hours as needed (Nausea/Vomiting) 30 tablet 2     senna-docusate (SENOKOT-S;PERICOLACE) 8.6-50 MG per tablet Take 2 tablets by mouth 2 times daily 120 tablet 1     PHYSICAL EXAMINATION:   General: The patient is a pleasant female in no acute distress. She appears moderately fatigued. She lies on the exam table for the duration of the visit, mostly with her eyes closed. History of taken primarily from her daughter.   /79  Pulse 104  Temp 98.9  F (37.2  C) (Oral)  Resp 16  Ht 1.575 m (5' 2\")  Wt 56.2 kg (123 lb 14.4 oz)  SpO2 91%  BMI 22.66 kg/m2  Wt Readings from Last 10 Encounters:   12/05/18 55.4 kg (122 lb 1.6 oz)   11/28/18 56.2 kg (123 lb 14.4 oz)   11/21/18 55.4 kg (122 lb 1.6 oz)   11/20/18 54.9 kg (121 lb)   11/20/18 55.1 kg (121 lb 6.4 oz)   11/13/18 " 56.5 kg (124 lb 9 oz)   11/07/18 57 kg (125 lb 11.2 oz)   HEENT: EOMI, PERRL. Sclerae are anicteric. Oral mucosa is pink and moist with no lesions or thrush.   Lymph: Neck is supple with no lymphadenopathy in the cervical or supraclavicular areas.   Heart: Mildly tachycardic with regular rhythm.   Lungs: Clear to auscultation bilaterally.   Abdomen: Bowel sounds present, soft, nontender with no palpable hepatosplenomegaly or masses.   Extremities: No lower extremity edema noted bilaterally.   Neuro: Cranial nerves II through XII are grossly intact.  Skin: No rashes, petechiae, or bruising noted on exposed skin.     LABORATORY DATA AND IMAGING REVIEWED DURING THIS VISIT:   11/28/2018 09:12   Sodium 128 (L)   Potassium 4.0   Chloride 93 (L)   Carbon Dioxide 23   Urea Nitrogen 10   Creatinine 0.62   GFR Estimate >90   GFR Estimate If Black >90   Calcium 8.3 (L)   Anion Gap 12   Albumin 2.6 (L)   Protein Total 6.5 (L)   Bilirubin Total 0.4   Alkaline Phosphatase 149   ALT 33   AST 28   Glucose 84   WBC 17.5 (H)   Hemoglobin 10.5 (L)   Hematocrit 32.6 (L)   Platelet Count 329   RBC Count 3.67 (L)   MCV 89   MCH 28.6   MCHC 32.2   RDW 13.3   Diff Method Manual Differential   % Neutrophils 72.6   % Lymphocytes 8.8   % Monocytes 14.1   % Eosinophils 0.9   % Basophils 0.0   % Metamyelocytes 1.8   % Myelocytes 1.8   Absolute Neutrophil 12.7 (H)   Absolute Lymphocytes 1.5   Absolute Monocytes 2.5 (H)   Absolute Eosinophils 0.2   Absolute Basophils 0.0   Absolute Metamyelocytes 0.3 (H)   Absolute Myelocytes 0.3 (H)   Poikilocytosis Moderate   Brandi Cells Slight   Platelet Estimate Confirming automa...     ECOG PS: 3     ASSESSMENT AND PLAN:    This is a 78-year-old lady with squamous cell carcinoma, which appears to have originated from the main stem bronchus.  There is evidence of a pericardium-based mass and pericardial effusion.  There is a concern for intraluminal extension into the superior vena cava and a tumor deposit  between the ascending aorta and superior vena cava.      SCC of the lung. Patient started on treatment with carboplatin and Taxol on 11/21/18 with Neulasta support. She is having difficulty with nausea and dysgeusia, leading to poor po intake. I will see her back next week in close follow up. She will be due for cycle 2 in 2 weeks. Plan is for 2-3 cycles of carboplatin and Taxol, followed by radiation.     Pain. Improved from last week. Located in her left shoulder, jaw, and neck. Will continue on Oxycontin 10 mg bid and Percocet prn, currently about 4/day. As she just increased the OxyContin yesterday to bid, I will not make further adjustments. She will be getting set up with palliative care as well.    FEN  -Weight loss. She does not tolerate the sweet nutritional supplements. Recommend trying Benecalorie 2-3/day mixed with other foods like soups. She will also start on Marinol 2.5 mg bid to try to stimulate her appetite. I will also refer her to see a dietician.   -Dehydration. She will receive 1L IV NS today. She will work on pushing fluids at home with a goal of 48-64 oz/day.    GI  -Abdominal gas pain. She will try Gas-X.   -Constipation. Under fairly good control with 2 Senna-S bid, which she will continue.      Neutrophilia. Suspect secondary to inflammation from her cancer, worse after receiving Neulasta last week. She has no evidence of infection on H&P.      Atiya Houston PA-C  [facility]  [address], [place] [id number] [phone]

## 2018-11-28 NOTE — PROGRESS NOTES
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  CT scan 10/24/2018:  A cystic mass involving the left side of the mediastinum extending from the left hilum towards the cardiac apex, abutting the pericardium over a long extent peripherally.  Enhancing soft tissue element as well surrounding atelectatic lingula.  MRA brain 10/31/2018, no brain metastases.  PET/CT scan 11/13/2018 and CT-guided biopsy 11/01/2018 consistent with squamous cell carcinoma.      TREATMENT:  The patient is initiated neoadjuvant treatment with carboplatin and Taxol.  Plan to complete 2-3 cycles followed by a CT scan and a plan for sequential radiation followed by maintenance immune therapy. Cycle 1 carboplatin and Taxol given on 11/21/18.      SUBJECTIVE:    Patient reports that her first 2 days following chemotherapy went fairly well.  On day 3, she had an increase of in her chest pain after receiving Neulasta.  She feels this is improving.  She was taking OxyContin once a day and just increased it to twice a day yesterday.  She is taking about 4 Percocet per day.  She reports that her pain is in her left shoulder, jaw, and neck.  She feels the pain is slightly better.  She reports nausea, but very little vomiting.  She reports the nausea comes in waves.  She reports that her eating is down due to poor taste.  She is not taking any MiraLAX, but is using 2 senna S twice a day and having bowel movements every 3-4 days.  She finds that Ensure, Boost, and [place] instant breakfast are all too sweet for her.  She is sleeping okay with the use of Ativan.  She does have some abdominal gas in the evenings.  She has not been checking her blood pressure at home.  She reports that her fluid intake is fair.  She is getting up to 50 ounces of fluid per day.  She denies other concerns.    Meds  Current Outpatient  "Prescriptions   Medication Sig Dispense Refill     dronabinol (MARINOL) 2.5 MG capsule Take 1 capsule (2.5 mg) by mouth 2 times daily (before meals) (Patient not taking: Reported on 12/5/2018) 60 capsule 0     LORazepam (ATIVAN) 0.5 MG tablet Take 1 tablet (0.5 mg) by mouth every 4 hours as needed (Anxiety, Nausea/Vomiting or Sleep) 30 tablet 2     losartan (COZAAR) 50 MG tablet TK 1 T PO D  11     ondansetron (ZOFRAN) 8 MG tablet Take 1 tablet (8 mg) by mouth every 8 hours as needed for nausea 60 tablet 3     ondansetron (ZOFRAN) 8 MG tablet Take 1 tablet (8 mg) by mouth every 8 hours as needed (Nausea/Vomiting) 10 tablet 2     oxyCODONE (OXYCONTIN) 10 MG 12 hr tablet Take 1 tablet (10 mg) by mouth every 12 hours maximum 2 tablet(s) per day 60 tablet 0     oxyCODONE-acetaminophen (PERCOCET) 5-325 MG per tablet Take 1 tablet by mouth every 6 hours as needed for severe pain 50 tablet 0     polyethylene glycol (MIRALAX/GLYCOLAX) Packet Take 17 g by mouth daily 7 packet 3     prochlorperazine (COMPAZINE) 10 MG tablet Take 1 tablet (10 mg) by mouth every 6 hours as needed (Nausea/Vomiting) 30 tablet 2     senna-docusate (SENOKOT-S;PERICOLACE) 8.6-50 MG per tablet Take 2 tablets by mouth 2 times daily 120 tablet 1     PHYSICAL EXAMINATION:   General: The patient is a pleasant female in no acute distress. She appears moderately fatigued. She lies on the exam table for the duration of the visit, mostly with her eyes closed. History of taken primarily from her daughter.   /79  Pulse 104  Temp 98.9  F (37.2  C) (Oral)  Resp 16  Ht 1.575 m (5' 2\")  Wt 56.2 kg (123 lb 14.4 oz)  SpO2 91%  BMI 22.66 kg/m2  Wt Readings from Last 10 Encounters:   12/05/18 55.4 kg (122 lb 1.6 oz)   11/28/18 56.2 kg (123 lb 14.4 oz)   11/21/18 55.4 kg (122 lb 1.6 oz)   11/20/18 54.9 kg (121 lb)   11/20/18 55.1 kg (121 lb 6.4 oz)   11/13/18 56.5 kg (124 lb 9 oz)   11/07/18 57 kg (125 lb 11.2 oz)   HEENT: EOMI, PERRL. Sclerae are " anicteric. Oral mucosa is pink and moist with no lesions or thrush.   Lymph: Neck is supple with no lymphadenopathy in the cervical or supraclavicular areas.   Heart: Mildly tachycardic with regular rhythm.   Lungs: Clear to auscultation bilaterally.   Abdomen: Bowel sounds present, soft, nontender with no palpable hepatosplenomegaly or masses.   Extremities: No lower extremity edema noted bilaterally.   Neuro: Cranial nerves II through XII are grossly intact.  Skin: No rashes, petechiae, or bruising noted on exposed skin.     LABORATORY DATA AND IMAGING REVIEWED DURING THIS VISIT:   11/28/2018 09:12   Sodium 128 (L)   Potassium 4.0   Chloride 93 (L)   Carbon Dioxide 23   Urea Nitrogen 10   Creatinine 0.62   GFR Estimate >90   GFR Estimate If Black >90   Calcium 8.3 (L)   Anion Gap 12   Albumin 2.6 (L)   Protein Total 6.5 (L)   Bilirubin Total 0.4   Alkaline Phosphatase 149   ALT 33   AST 28   Glucose 84   WBC 17.5 (H)   Hemoglobin 10.5 (L)   Hematocrit 32.6 (L)   Platelet Count 329   RBC Count 3.67 (L)   MCV 89   MCH 28.6   MCHC 32.2   RDW 13.3   Diff Method Manual Differential   % Neutrophils 72.6   % Lymphocytes 8.8   % Monocytes 14.1   % Eosinophils 0.9   % Basophils 0.0   % Metamyelocytes 1.8   % Myelocytes 1.8   Absolute Neutrophil 12.7 (H)   Absolute Lymphocytes 1.5   Absolute Monocytes 2.5 (H)   Absolute Eosinophils 0.2   Absolute Basophils 0.0   Absolute Metamyelocytes 0.3 (H)   Absolute Myelocytes 0.3 (H)   Poikilocytosis Moderate   [place] Cells Slight   Platelet Estimate Confirming automa...     ECOG PS: 3     ASSESSMENT AND PLAN:    This is a 78-year-old lady with squamous cell carcinoma, which appears to have originated from the main stem bronchus.  There is evidence of a pericardium-based mass and pericardial effusion.  There is a concern for intraluminal extension into the superior vena cava and a tumor deposit between the ascending aorta and superior vena cava.      SCC of the lung. Patient started on  treatment with carboplatin and Taxol on 11/21/18 with Neulasta support. She is having difficulty with nausea and dysgeusia, leading to poor po intake. I will see her back next week in close follow up. She will be due for cycle 2 in 2 weeks. Plan is for 2-3 cycles of carboplatin and Taxol, followed by radiation.     Pain. Improved from last week. Located in her left shoulder, jaw, and neck. Will continue on Oxycontin 10 mg bid and Percocet prn, currently about 4/day. As she just increased the OxyContin yesterday to bid, I will not make further adjustments. She will be getting set up with palliative care as well.    FEN  -Weight loss. She does not tolerate the sweet nutritional supplements. Recommend trying Benecalorie 2-3/day mixed with other foods like soups. She will also start on Marinol 2.5 mg bid to try to stimulate her appetite. I will also refer her to see a dietician.   -Dehydration. She will receive 1L IV NS today. She will work on pushing fluids at home with a goal of 48-64 oz/day.    GI  -Abdominal gas pain. She will try Gas-X.   -Constipation. Under fairly good control with 2 Senna-S bid, which she will continue.      Neutrophilia. Suspect secondary to inflammation from her cancer, worse after receiving Neulasta last week. She has no evidence of infection on H&P.      Atiya Houston PA-C  [facility]  [address], [place] [id number] [phone]

## 2018-11-28 NOTE — NURSING NOTE
"Oncology Rooming Note    November 28, 2018 9:29 AM   Barbie Deluca is a 78 year old female who presents for:    Chief Complaint   Patient presents with     Port Draw     [facility] labs drawn from port by RN. Line flushed with saline and heparin. IB sent to Atiya Celso RAGINI 11/27. Message sent as a reminder to place orders and notify lab. vs taken - HR elevated, O2 low 90s. Left port accessed in case needs add'l labs drawn.     Oncology Clinic Visit     Mediastinal Mass     Initial Vitals: /84 (BP Location: Left arm, Cuff Size: Adult Regular)  Pulse 114  Temp 98.9  F (37.2  C) (Oral)  Resp 16  Ht 1.575 m (5' 2\")  Wt 56.2 kg (123 lb 14.4 oz)  SpO2 91%  BMI 22.66 kg/m2 Estimated body mass index is 22.66 kg/(m^2) as calculated from the following:    Height as of this encounter: 1.575 m (5' 2\").    Weight as of this encounter: 56.2 kg (123 lb 14.4 oz). Body surface area is 1.57 meters squared.  No Pain (0) Comment: Data Unavailable   No LMP recorded. Patient is postmenopausal.  Allergies reviewed: Yes  Medications reviewed: Yes    Medications: Medication refills not needed today.  Pharmacy name entered into [email or web address]: [facility] [address] [place] [address] [address] AT [facility] [address]    Clinical concerns: No New Concerns    5 minutes for nursing intake (face to face time)     CHRISTOPHE Lopez      "

## 2018-11-28 NOTE — PATIENT INSTRUCTIONS
Try Gas-X (simethicone) for gas pains.   Try Benecalorie 2-3 per day for increased calorie/protein intake.   Start Marinol 2.5 mg twice/day before two biggest meals of the day for appetite stimulation.   Try increasing fluid intake to 48-64 oz per day.

## 2018-11-29 NOTE — TELEPHONE ENCOUNTER
"Patient's daughter calling reporting patient is feeling weak. States patient was seen in clinic today and was given Normal Saline Bolus and started an appetite booster medication dronabinol (MARINOL) 2.5 MG capsule. States patient was feeling dizzy today and currently too weak to stand. States \"we had to carry her to go to the bathroom\". Was having severe nerve pain radiating from upper arm to the hands earlier as well. Had difficulty of breathing earlier. Denies difficulty of breathing at rest currently. States patient is not able to stand on her own. Patient was able to walk at the clinic this morning. Per guideline, advised caller to call 911. Caller verbalized understanding. Denies further questions.      Gama Hurtado RN  [facility]    Reason for Disposition    Shock suspected (e.g., cold/pale/clammy skin, too weak to stand, low BP, rapid pulse)    Additional Information    Negative: Severe difficulty breathing (e.g., struggling for each breath, speaks in single words)    Protocols used: WEAKNESS (GENERALIZED) AND FATIGUE-ADULT-AH      "

## 2018-12-05 PROBLEM — E86.0 ACUTE DEHYDRATION: Status: ACTIVE | Noted: 2018-01-01

## 2018-12-05 NOTE — IP AVS SNAPSHOT
MRN:[id number]                      After Visit Summary   12/5/2018    Barbie Deluca    MRN: [id number]           Thank you!     Thank you for choosing [facility] for your care. Our goal is always to provide you with excellent care. Hearing back from our patients is one way we can continue to improve our services. Please take a few minutes to complete the written survey that you may receive in the mail after you visit with us. Thank you!        Patient Information     Date Of Birth          1940        Designated Caregiver       Most Recent Value    Caregiver    Will someone help with your care after discharge? yes    Name of designated caregiver Izzy    Phone number of caregiver [phone]    Caregiver address NA      About your hospital stay     You were admitted on:  December 5, 2018 You last received care in the:  Unit [address] [facility]    You were discharged on:  December 8, 2018        Reason for your hospital stay       You were hospitalized for evaluation of nausea and vomiting. This improved after starting steroids. No additional intervention was needed.                  Who to Call     For medical emergencies, please call 911.  For non-urgent questions about your medical care, please call your primary care provider or clinic, [phone]          Attending Provider     Provider Specialty    Tg Solares MD Internal Medicine-Hematology & Oncology    Kelsey Bell MD Hematology & Oncology       Primary Care Provider Office Phone # Fax #    Erik Reaves [phone]       When to contact your care team       Call the triage line if you develop worsening nausea/vomiting and are unable to keep fluids down.                  After Care Instructions     Activity       Your activity upon discharge: activity as tolerated and no driving for today            Diet       Follow this diet upon discharge: regular diet as tolerated                  Follow-up Appointments      Adult [facility] Follow-up and recommended labs and tests       Follow-up with Dr. Solares within the next 1-2 weeks.    Appointments on [place] and/or [facility] (with [facility] or [facility] provider or service). Call [phone] if you haven't heard regarding these appointments within 7 days of discharge.                  Your next 10 appointments already scheduled     Dec 12, 2018  9:00 AM CST   [facility] Draw with [facility] LAB DRAW   [facility] Lab Draw ([facility])    [address] [place] [address]  [address]  [facility] [id number] [phone]            Dec 12, 2018  9:30 AM CST   (Arrive by 9:15 AM)   Return Visit with Atiya Houston PA-C   [facility] Cancer [facility] ([facility])    [address] [place] [address]  [address]  [facility] [id number] [phone]            Dec 12, 2018 10:00 AM CST   Infusion 180 with  ONCOLOGY INFUSION, UC 28 ATC   [facility] ([facility])    [address] [place] [address]  [address]  [facility] [id number] [phone]              Additional Services     Home Care OT Referral for Hospital Discharge       OT to eval and treat  Agency:  [facility]  Phone # [phone]  Fax # [phone]    Your provider has ordered home care - physical therapy. If you have not been contacted within 2 days of your discharge please call the department phone number listed on the top of this document.            Home Care PT Referral for Hospital Discharge       PT to eval and treat  Agency:  [facility]  Phone # [phone]  Fax # [phone]    Your provider has ordered home care - physical therapy. If you have not been contacted within 2 days of your discharge please call the department phone number listed on the top of this document.            Home care nursing referral       Home Care:  Agency:  [facility]  Phone # [phone]  Fax # [phone]    To  Provide:  Skilled nurse visits - frequency per home care evaluation   RN to assess --- hydration, nutrition and bowel status, signs/symptoms of infection, neurologic status, skin integrity, respiratory and cardiac status, pain level and activity tolerance, vital signs and weight, lab draws if ordered, home safety.     RN to teach/reinforce teaching ---medication, disease, and symptom management    RN to assist with communication to --- Primary Care Provider    ______________________________________________________    Your provider has ordered home care nursing services. If you have not been contacted within 2 days of your discharge please call the inpatient department phone number at [phone] .                  Further instructions from your care team       Discharging RN Please fax orders to [facility]: [phone]    Pending Results     No orders found from 12/3/2018 to 12/6/2018.            Statement of Approval     Ordered          12/08/18 1308  I have reviewed and agree with all the recommendations and orders detailed in this document.  EFFECTIVE NOW     Approved and electronically signed by:  Kavitha Garcia MD             Admission Information     Date & Time Provider Department Dept. Phone    12/5/2018 Kelsey Bell MD Unit 7D [facility] [phone]      Your Vitals Were     Blood Pressure Pulse Temperature Respirations Weight Pulse Oximetry    138/87 (BP Location: Left arm) 73 96.5  F (35.8  C) (Axillary) 18 60.3 kg (133 lb) 95%    BMI (Body Mass Index)                   24.33 kg/m2           MyChart Information     [email or web address] gives you secure access to your electronic health record. If you see a primary care provider, you can also send messages to your care team and make appointments. If you have questions, please call your primary care clinic.  If you do not have a primary care provider, please call [phone] and they will assist you.        Care EveryWhere ID     This is your Care  EveryWhere ID. This could be used by other organizations to access your [facility] medical records  [id number]        Equal Access to Services     MARCY MÁRQUEZ : Elena Verma, wagueritada lujustinerinha, fabita kalianada husamfran, waxhumberto brenda martinselvin weinerayla loza ismael starr. So [facility] [phone].    ATENCIÓN: Si habla español, tiene a alarcon disposición servicios gratuitos de asistencia lingüística. Llame al [phone].    We comply with applicable federal civil rights laws and [place] laws. We do not discriminate on the basis of race, color, national origin, age, disability, sex, sexual orientation, or gender identity.               Review of your medicines      START taking        Dose / Directions    dexamethasone 4 MG tablet   Commonly known as:  DECADRON   Used for:  Malignant cachexia (H)        Dose:  4 mg   Start taking on:  12/9/2018   Take 4 mg by mouth daily.   Quantity:  30 tablet   Refills:  1       metoprolol tartrate 25 MG tablet   Commonly known as:  LOPRESSOR   Used for:  Atrial fibrillation, unspecified type (H)        Dose:  12.5 mg   Take 0.5 tablets (12.5 mg) by mouth 2 times daily   Quantity:  60 tablet   Refills:  0       OLANZapine 2.5 MG tablet   Commonly known as:  zyPREXA   Used for:  Nausea and vomiting, intractability of vomiting not specified, unspecified vomiting type        Dose:  2.5 mg   Take 1 tablet (2.5 mg) by mouth At Bedtime   Quantity:  30 tablet   Refills:  1       senna-docusate 8.6-50 MG tablet   Commonly known as:  SENOKOT-S/PERICOLACE   Used for:  Drug-induced constipation        Dose:  2-4 tablet   Take 2-4 tablets by mouth 2 times daily   Quantity:  60 tablet   Refills:  1         CONTINUE these medicines which have NOT CHANGED        Dose / Directions    CLARITIN 10 MG tablet   Generic drug:  loratadine        Dose:  10 mg   Take 1 tablet (10 mg) by mouth daily Take day of chemotherapy and for 4 days afterwards.   Refills:  0       LORazepam 0.5 MG tablet    Commonly known as:  ATIVAN   Used for:  Non-small cell cancer of left lung (H), Encounter for antineoplastic chemotherapy, Encounter for other specified aftercare        Dose:  0.5 mg   Take 1 tablet (0.5 mg) by mouth every 4 hours as needed (Anxiety, Nausea/Vomiting or Sleep)   Quantity:  30 tablet   Refills:  2       losartan 50 MG tablet   Commonly known as:  COZAAR        take 1 tablet by mouth daily   Refills:  11       mirtazapine 7.5 MG tablet   Commonly known as:  REMERON        Dose:  7.5 mg   Take 1 tablet (7.5 mg) by mouth At Bedtime   Refills:  0       ondansetron 8 MG tablet   Commonly known as:  ZOFRAN   Used for:  Non-small cell cancer of left lung (H), Nausea        Dose:  4 mg   Take 0.5 tablets (4 mg) by mouth every 8 hours as needed for nausea   Refills:  0       oxyCODONE 10 MG 12 hr tablet   Commonly known as:  oxyCONTIN   Used for:  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), Non-small cell cancer of left lung (H), Other constipation        Dose:  10 mg   Take 1 tablet (10 mg) by mouth every 12 hours maximum 2 tablet(s) per day   Quantity:  60 tablet   Refills:  0       oxyCODONE-acetaminophen 5-325 MG tablet   Commonly known as:  PERCOCET   Used for:  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), Non-small cell cancer of left lung (H), Other constipation        Dose:  1 tablet   Take 1 tablet by mouth every 6 hours as needed for severe pain   Quantity:  50 tablet   Refills:  0       polyethylene glycol packet   Commonly known as:  MIRALAX/GLYCOLAX   Used for:  Non-small cell cancer of left lung (H), 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), Other constipation        Dose:  1 packet   Take 17 g by mouth daily   Quantity:  7 packet   Refills:  3            Where to get your medicines      These medications were sent to [facility] [place], [place] - [address]  [address] [address]     Phone:  [phone]     dexamethasone 4  MG tablet    metoprolol tartrate 25 MG tablet    OLANZapine 2.5 MG tablet    senna-docusate 8.6-50 MG tablet                Protect others around you: Learn how to safely use, store and throw away your medicines at www.disposemymeds.org.             Medication List: This is a list of all your medications and when to take them. Check marks below indicate your daily home schedule. Keep this list as a reference.      Medications           Morning Afternoon Evening Bedtime As Needed    CLARITIN 10 MG tablet   Take 1 tablet (10 mg) by mouth daily Take day of chemotherapy and for 4 days afterwards.   Generic drug:  loratadine                                dexamethasone 4 MG tablet   Commonly known as:  DECADRON   Take 4 mg by mouth daily.   Start taking on:  12/9/2018   Last time this was given:  8 mg on 12/8/2018  8:39 AM                                LORazepam 0.5 MG tablet   Commonly known as:  ATIVAN   Take 1 tablet (0.5 mg) by mouth every 4 hours as needed (Anxiety, Nausea/Vomiting or Sleep)   Last time this was given:  0.5 mg on 12/8/2018 12:12 AM                                losartan 50 MG tablet   Commonly known as:  COZAAR   take 1 tablet by mouth daily   Last time this was given:  50 mg on 12/8/2018  8:40 AM                                metoprolol tartrate 25 MG tablet   Commonly known as:  LOPRESSOR   Take 0.5 tablets (12.5 mg) by mouth 2 times daily   Last time this was given:  12.5 mg on 12/7/2018  8:34 PM                                mirtazapine 7.5 MG tablet   Commonly known as:  REMERON   Take 1 tablet (7.5 mg) by mouth At Bedtime                                OLANZapine 2.5 MG tablet   Commonly known as:  zyPREXA   Take 1 tablet (2.5 mg) by mouth At Bedtime   Last time this was given:  2.5 mg on 12/7/2018  9:58 PM                                ondansetron 8 MG tablet   Commonly known as:  ZOFRAN   Take 0.5 tablets (4 mg) by mouth every 8 hours as needed for nausea                                 oxyCODONE 10 MG 12 hr tablet   Commonly known as:  oxyCONTIN   Take 1 tablet (10 mg) by mouth every 12 hours maximum 2 tablet(s) per day   Last time this was given:  10 mg on 12/8/2018  8:40 AM                                oxyCODONE-acetaminophen 5-325 MG tablet   Commonly known as:  PERCOCET   Take 1 tablet by mouth every 6 hours as needed for severe pain                                polyethylene glycol packet   Commonly known as:  MIRALAX/GLYCOLAX   Take 17 g by mouth daily   Last time this was given:  17 g on 12/8/2018  8:41 AM                                senna-docusate 8.6-50 MG tablet   Commonly known as:  SENOKOT-S/PERICOLACE   Take 2-4 tablets by mouth 2 times daily   Last time this was given:  4 tablets on 12/8/2018  8:41 AM

## 2018-12-05 NOTE — PATIENT INSTRUCTIONS
[facility] Main Line: [phone]    Call triage nurse with chills and/or temperature greater than or equal to 100.4, uncontrolled nausea/vomiting, diarrhea, constipation, dizziness, shortness of breath, chest pain, bleeding, unexplained bruising, or any new/concerning symptoms, questions/concerns.   If you are having any concerning symptoms or wish to speak to a provider before your next infusion visit, please call your care coordinator or triage to notify them so we can adequately serve you.   Triage Nurse Line: [phone]    If after hours, weekends, or holidays [phone]               December 2018 Sunday Monday Tuesday Wednesday Thursday Friday Saturday                                 1       2     3     4     5     [facility] DRAW    9:45 AM   (15 min.)    [facility] DRAW   [facility] Lab Draw     [facility] RETURN   10:05 AM   (50 min.)   Atiya Houston PA-C   [facility] ONC INFUSION 120   11:30 AM   (120 min.)   [facility]   12:30 PM   (75 min.)   Linus Tolentino MD   [facility]     6     7     8       9     10     11     12     [facility] DRAW    9:00 AM   (15 min.)    [facility] DRAW   [facility] Lab Draw     [facility] RETURN    9:15 AM   (50 min.)   Atiya Houston PA-C   [facility] ONC INFUSION 180   10:00 AM   (180 min.)    ONCOLOGY INFUSION   [facility] 13     14     15       16     17     18     19     20     21     22       23     24     25     26     27     28     29       30     31 January 2019 Sunday Monday Tuesday Wednesday Thursday Friday Saturday             1     2     3     4     5       6     7     8     9     10     11     12       13     14     15     16     17     18     19       20     21     22     23     24     25     26       27      28     29     30     31                            Lab Results:  Recent Results (from the past 12 hour(s))   Comprehensive metabolic panel    Collection Time: 12/05/18 11:13 AM   Result Value Ref Range    Sodium 130 (L) 133 - 144 mmol/L    Potassium 4.4 3.4 - 5.3 mmol/L    Chloride 94 94 - 109 mmol/L    Carbon Dioxide 25 20 - 32 mmol/L    Anion Gap 11 3 - 14 mmol/L    Glucose 81 70 - 99 mg/dL    Urea Nitrogen 10 7 - 30 mg/dL    Creatinine 0.59 0.52 - 1.04 mg/dL    GFR Estimate >90 >60 mL/min/1.7m2    GFR Estimate If Black >90 >60 mL/min/1.7m2    Calcium 8.4 (L) 8.5 - 10.1 mg/dL    Bilirubin Total 0.4 0.2 - 1.3 mg/dL    Albumin 2.6 (L) 3.4 - 5.0 g/dL    Protein Total 6.7 (L) 6.8 - 8.8 g/dL    Alkaline Phosphatase 145 40 - 150 U/L    ALT 18 0 - 50 U/L    AST 17 0 - 45 U/L   CBC with platelets differential    Collection Time: 12/05/18 11:13 AM   Result Value Ref Range    WBC 28.7 (H) 4.0 - 11.0 10e9/L    RBC Count 3.73 (L) 3.8 - 5.2 10e12/L    Hemoglobin 10.8 (L) 11.7 - 15.7 g/dL    Hematocrit 33.1 (L) 35.0 - 47.0 %    MCV 89 78 - 100 fl    MCH 29.0 26.5 - 33.0 pg    MCHC 32.6 31.5 - 36.5 g/dL    RDW 14.1 10.0 - 15.0 %    Platelet Count 372 150 - 450 10e9/L    Diff Method Manual Differential     % Neutrophils 93.9 %    % Lymphocytes 1.7 %    % Monocytes 3.5 %    % Eosinophils 0.0 %    % Basophils 0.0 %    % Myelocytes 0.9 %    Absolute Neutrophil 26.9 (H) 1.6 - 8.3 10e9/L    Absolute Lymphocytes 0.5 (L) 0.8 - 5.3 10e9/L    Absolute Monocytes 1.0 0.0 - 1.3 10e9/L    Absolute Eosinophils 0.0 0.0 - 0.7 10e9/L    Absolute Basophils 0.0 0.0 - 0.2 10e9/L    Absolute Myelocytes 0.3 (H) 0 10e9/L    Anisocytosis Slight     Poikilocytosis Slight     Brandi Cells Slight     Platelet Estimate Confirming automated cell count

## 2018-12-05 NOTE — NURSING NOTE
Chief Complaint   Patient presents with     Port Draw     No lab orders. VS taken by RN.      Suri Neil, RN

## 2018-12-05 NOTE — NURSING NOTE
RN unable to chart heparin locked port in MAR due to epic issue.  Port was heparin locked, with 500 units heparin at 1120 by this RN

## 2018-12-05 NOTE — NURSING NOTE
"Oncology Rooming Note    December 5, 2018 10:35 AM   Barbie Deluca is a 78 year old female who presents for:    Chief Complaint   Patient presents with     Port Draw     No lab orders. VS taken by RN.      Oncology Clinic Visit     Mediastinal Mass , Lung Ca , Labs      Initial Vitals: /75 (BP Location: Right arm, Patient Position: Sitting, Cuff Size: Adult Regular)  Pulse 121  Temp 98.8  F (37.1  C) (Oral)  Resp 18  Wt 55.4 kg (122 lb 1.6 oz)  SpO2 91%  BMI 22.33 kg/m2 Estimated body mass index is 22.33 kg/(m^2) as calculated from the following:    Height as of 11/28/18: 1.575 m (5' 2\").    Weight as of this encounter: 55.4 kg (122 lb 1.6 oz). Body surface area is 1.56 meters squared.  Mild Pain (2) Comment: Data Unavailable   No LMP recorded. Patient is postmenopausal.  Allergies reviewed: Yes  Medications reviewed: Yes    Medications: Medication refills not needed today.  [facility] into [facility] [address] [place] [address] [address] AT [facility] [place] [address]    Clinical concerns:Discuss  Medications  Cel[place]  was notified.    6  minutes for nursing intake (face to face time)     Nita Macario MA              "

## 2018-12-05 NOTE — MR AVS SNAPSHOT
After Visit Summary   12/5/2018    Barbie Deluca    MRN: [id number]           Patient Information     Date Of Birth          1940        Visit Information        Provider Department      12/5/2018 9:45 AM  [facility] DRAW [facility] Lab Draw        [facility] Main Line: [phone]    Call triage nurse with chills and/or temperature greater than or equal to 100.4, uncontrolled nausea/vomiting, diarrhea, constipation, dizziness, shortness of breath, chest pain, bleeding, unexplained bruising, or any new/concerning symptoms, questions/concerns.   If you are having any concerning symptoms or wish to speak to a provider before your next infusion visit, please call your care coordinator or triage to notify them so we can adequately serve you.   Triage Nurse Line: [phone]    If after hours, weekends, or holidays [phone]               December 2018 Sunday Monday Tuesday Wednesday Thursday Friday Saturday                                 1       2     3     4     5     [facility] DRAW    9:45 AM   (15 min.)    [facility] DRAW   [facility] Lab Draw     [facility] RETURN   10:05 AM   (50 min.)   Atiya Houston PA-C   [facility] ONC INFUSION 120   11:30 AM   (120 min.)    ONCOLOGY INFUSION   [facility] NEW   12:30 PM   (75 min.)   Linus Tolentino MD   [facility]     6     7     8       9     10     11     12     [facility] DRAW    9:00 AM   (15 min.)    [facility]   [facility]     [facility] RETURN    9:15 AM   (50 min.)   Atiya Houston PA-C   [facility] ONC INFUSION 180   10:00 AM   (180 min.)    ONCOLOGY INFUSION   [facility] 13     14     15       16     17     18     19     20     21     22       23     24     25     26     27     28     29       30     31                                          January 2019 Sunday Monday Tuesday Wednesday Thursday Friday Saturday             1     2     3     4     5       6     7     8     9     10     11     12       13     14     15     16     17     18     19       20     21     22     23     24     25     26       27     28     29     30     31                            Lab Results:  Recent Results (from the past 12 hour(s))   Comprehensive metabolic panel    Collection Time: 12/05/18 11:13 AM   Result Value Ref Range    Sodium 130 (L) 133 - 144 mmol/L    Potassium 4.4 3.4 - 5.3 mmol/L    Chloride 94 94 - 109 mmol/L    Carbon Dioxide 25 20 - 32 mmol/L    Anion Gap 11 3 - 14 mmol/L    Glucose 81 70 - 99 mg/dL    Urea Nitrogen 10 7 - 30 mg/dL    Creatinine 0.59 0.52 - 1.04 mg/dL    GFR Estimate >90 >60 mL/min/1.7m2    GFR Estimate If Black >90 >60 mL/min/1.7m2    Calcium 8.4 (L) 8.5 - 10.1 mg/dL    Bilirubin Total 0.4 0.2 - 1.3 mg/dL    Albumin 2.6 (L) 3.4 - 5.0 g/dL    Protein Total 6.7 (L) 6.8 - 8.8 g/dL    Alkaline Phosphatase 145 40 - 150 U/L    ALT 18 0 - 50 U/L    AST 17 0 - 45 U/L   CBC with platelets differential    Collection Time: 12/05/18 11:13 AM   Result Value Ref Range    WBC 28.7 (H) 4.0 - 11.0 10e9/L    RBC Count 3.73 (L) 3.8 - 5.2 10e12/L    Hemoglobin 10.8 (L) 11.7 - 15.7 g/dL    Hematocrit 33.1 (L) 35.0 - 47.0 %    MCV 89 78 - 100 fl    MCH 29.0 26.5 - 33.0 pg    MCHC 32.6 31.5 - 36.5 g/dL    RDW 14.1 10.0 - 15.0 %    Platelet Count 372 150 - 450 10e9/L    Diff Method Manual Differential     % Neutrophils 93.9 %    % Lymphocytes 1.7 %    % Monocytes 3.5 %    % Eosinophils 0.0 %    % Basophils 0.0 %    % Myelocytes 0.9 %    Absolute Neutrophil 26.9 (H) 1.6 - 8.3 10e9/L    Absolute Lymphocytes 0.5 (L) 0.8 - 5.3 10e9/L    Absolute Monocytes 1.0 0.0 - 1.3 10e9/L    Absolute Eosinophils 0.0 0.0 - 0.7 10e9/L    Absolute Basophils 0.0 0.0 - 0.2 10e9/L    Absolute Myelocytes 0.3 (H) 0 10e9/L    Anisocytosis Slight      Poikilocytosis Slight     [place] Cells Slight     Platelet Estimate Confirming automated cell count                Follow-ups after your visit        Your next 10 appointments already scheduled     Dec 05, 2018 12:45 PM CST   (Arrive by 12:30 PM)   New Patient Visit with Linus Tolentino MD   [facility]    [address] [place] [address]  [address]  [facility] [address]   [phone]            Dec 12, 2018  9:00 AM CST   [facility] Draw with [facility] LAB DRAW   [facility] Draw [facility]    [address]  [address]  [facility] [address]   [phone]            Dec 12, 2018  9:30 AM CST   (Arrive by 9:15 AM)   Return Visit with Atiya Houston PA-C   [facility]    [address] [place] [address]  [address]  [facility] [address]   [phone]            Dec 12, 2018 10:00 AM CST   Infusion 180 with  ONCOLOGY INFUSION, UC 28 ATC   [facility]    [address]  [address]  [facility] [address]   [phone]              Who to contact     If you have questions or need follow up information about today's clinic visit or your schedule please contact [facility] LAB DRAW directly at [phone].  Normal or non-critical lab and imaging results will be communicated to you by MyChart, letter or phone within 4 business days after the clinic has received the results. If you do not hear from us within 7 days, please contact the clinic through MyChart or phone. If you have a critical or abnormal lab result, we will notify you by phone as soon as possible.  Submit refill requests through [employer] or call your pharmacy and they will forward the refill request to us. Please allow 3 business days for your refill to be completed.          Additional Information About Your Visit         [employer] Information     [employer] gives you secure access to your electronic health record. If you see a primary care provider, you can also send messages to your care team and make appointments. If you have questions, please call your primary care clinic.  If you do not have a primary care provider, please call [phone] and they will assist you.        Care EveryWhere ID     This is your Care EveryWhere ID. This could be used by other organizations to access your [facility] medical records  [id number]         Blood Pressure from Last 3 Encounters:   12/05/18 117/75   11/28/18 129/79   11/21/18 124/66    Weight from Last 3 Encounters:   12/05/18 55.4 kg (122 lb 1.6 oz)   11/28/18 56.2 kg (123 lb 14.4 oz)   11/21/18 55.4 kg (122 lb 1.6 oz)              Today, you had the following     No orders found for display         Today's Medication Changes          These changes are accurate as of 12/5/18 12:29 PM.  If you have any questions, ask your nurse or doctor.               These medicines have changed or have updated prescriptions.        Dose/Directions    ondansetron 8 MG tablet   Commonly known as:  ZOFRAN   This may have changed:    - how much to take  - Another medication with the same name was removed. Continue taking this medication, and follow the directions you see here.   Used for:  Non-small cell cancer of left lung (H), Nausea   Changed by:  Atiya Houston PA-C        Dose:  4 mg   Take 0.5 tablets (4 mg) by mouth every 8 hours as needed for nausea   Refills:  0         Stop taking these medicines if you haven't already. Please contact your care team if you have questions.     dronabinol 2.5 MG capsule   Commonly known as:  MARINOL   Stopped by:  Atiya Houston PA-C           prochlorperazine 10 MG tablet   Commonly known as:  COMPAZINE   Stopped by:  Atiya Houston PA-C           senna-docusate 8.6-50 MG tablet   Commonly known as:  SENOKOT-S/PERICOLACE   Stopped by:  Atiya Houston  MARY KATE Gregorio                    Primary Care Provider Office Phone # Fax #    Erik Reaves [phone]       [facility] FABIAN MARIO [address] [place] [address] DR LOGAN PERALTA [facility] [address]        Equal Access to Services     MARCY MÁRQUEZ : Hadfranchesca trevin ku hadjosefinao Soomaali, waaxda luqadaha, qaybta kaalmada adeegyada, gita salazarn husamayla loza ismael starr. So [facility] [phone].    ATENCIÓN: Si habla español, tiene a alarcon disposición servicios gratuitos de asistencia lingüística. Llame al [phone].    We comply with applicable federal civil rights laws and [place] laws. We do not discriminate on the basis of race, color, national origin, age, disability, sex, sexual orientation, or gender identity.            Thank you!     Thank you for choosing [facility]  for your care. Our goal is always to provide you with excellent care. Hearing back from our patients is one way we can continue to improve our services. Please take a few minutes to complete the written survey that you may receive in the mail after your visit with us. Thank you!             Your Updated Medication List - Protect others around you: Learn how to safely use, store and throw away your medicines at www.disposemymeds.org.          This list is accurate as of 12/5/18 12:29 PM.  Always use your most recent med list.                   Brand Name Dispense Instructions for use Diagnosis    CLARITIN 10 MG tablet   Generic drug:  loratadine      Take 1 tablet (10 mg) by mouth daily Take day of chemotherapy and for 4 days afterwards.        LORazepam 0.5 MG tablet    ATIVAN    30 tablet    Take 1 tablet (0.5 mg) by mouth every 4 hours as needed (Anxiety, Nausea/Vomiting or Sleep)    Non-small cell cancer of left lung (H), Encounter for antineoplastic chemotherapy, Encounter for other specified aftercare       losartan 50 MG tablet    COZAAR     TK 1 T PO D        mirtazapine 7.5 MG tablet    REMERON     Take 1 tablet (7.5 mg) by  mouth At Bedtime        ondansetron 8 MG tablet    ZOFRAN     Take 0.5 tablets (4 mg) by mouth every 8 hours as needed for nausea    Non-small cell cancer of left lung (H), Nausea       oxyCODONE 10 MG 12 hr tablet    oxyCONTIN    60 tablet    Take 1 tablet (10 mg) by mouth every 12 hours maximum 2 tablet(s) per day    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), Non-small cell cancer of left lung (H), Other constipation       oxyCODONE-acetaminophen 5-325 MG tablet    PERCOCET    50 tablet    Take 1 tablet by mouth every 6 hours as needed for severe pain    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), Non-small cell cancer of left lung (H), Other constipation       polyethylene glycol packet    MIRALAX/GLYCOLAX    7 packet    Take 17 g by mouth daily    Non-small cell cancer of left lung (H), 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), Other constipation

## 2018-12-05 NOTE — LETTER
12/5/2018      RE: Barbie Deluca  [address] 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  CT scan on 10/24/2018 showed a cystic mass involving the left side of the mediastinum extending from the left hilum towards the cardiac apex, abutting the pericardium over a long extent peripherally.  Enhancing soft tissue element as well surrounding atelectatic lingula.  MRA brain 10/31/2018 showed no brain metastases.  PET/CT scan 11/13/2018 and CT-guided biopsy 11/01/2018 consistent with squamous cell carcinoma. She started on neoadjuvant treatment with carboplatin and Taxol on 11/21/18.  Plan is to complete 2-3 cycles followed by a CT scan and a plan for sequential radiation followed by maintenance immune therapy. She comes in today for routine follow up.    Interval History:  Patient reports that she feels tired she spends her entire day laying down and is in and out of catnaps.  She is not really eating.  Yesterday she ate 6 spoonfuls of broth, tomato soup, and Ensure combined.  She is drinking about 12 ounces of fluids per day.  She tried Marinol last week, but did not notice a benefit and felt it made her tired.  She stopped the senna as she felt it was contributing to gas pains.  She did get some relief from Gas-X.  Her last bowel movement was about a week ago.  She is continuing to pass gas.  She does have mild abdominal gas pain now.  She reports some hot flashes, but no fevers or chills.  She denies chest pain.  She does still have dyspnea on exertion, but does feel a little less winded than she did prior to starting chemotherapy.  She has mild nausea and does vomit up most of her foods.  She does have ups and downs in terms of how she feels overall,  but her family feels the general trajectory has been that she has been declining, in particular over the last 3-4 days.  She is urinating about 2-3 times a day and her urine is very concentrated.  She reports that her feet have been more swollen over the last 4 days.  She has been continuing to use ice packs for her back and had.  She was getting headaches from 8 mg Zofran and so has decreased the Zofran to 4 mg today.  Thus far, she does not have a headache.  She describes her mood is frustrated.  It is unclear if her symptoms seem to get worse with chemotherapy.  She saw her primary care provider yesterday who prescribed her 7.5 mg mirtazapine to take to try to help her appetite.  She reports that her blood pressure was elevated when she was seeing her primary care provider, but improved later on.  She was unclear on why she was taking Claritin which we discussed was for Neulasta associated body aches.  Last week, after her appointment with me she took Marinol and then went home and took a Percocet.  Her family felt she was quite sedated with that combination.  She is taking Ativan at night to help her sleep.  She has not been taking any MiraLAX.  She feels Compazine made her tired, dizzy, and seemed to worsen her nausea.  Her pain is fairly well controlled on OxyContin and Percocet 4-5 tablets/day.  She denies other concerns.      Remainder of review of systems is otherwise negative.    Current Outpatient Medications   Medication Sig Dispense Refill     loratadine (CLARITIN) 10 MG tablet Take 1 tablet (10 mg) by mouth daily Take day of chemotherapy and for 4 days afterwards.       LORazepam (ATIVAN) 0.5 MG tablet Take 1 tablet (0.5 mg) by mouth every 4 hours as needed (Anxiety, Nausea/Vomiting or Sleep) 30 tablet 2     losartan (COZAAR) 50 MG tablet take 1 tablet by mouth daily  11     mirtazapine (REMERON) 7.5 MG tablet Take 1 tablet (7.5 mg) by mouth At Bedtime       ondansetron (ZOFRAN) 8 MG tablet Take 0.5  tablets (4 mg) by mouth every 8 hours as needed for nausea       polyethylene glycol (MIRALAX/GLYCOLAX) Packet Take 17 g by mouth daily 7 packet 3     dexamethasone (DECADRON) 4 MG tablet Take 4 mg by mouth daily. 30 tablet 1     metoprolol tartrate (LOPRESSOR) 25 MG tablet Take 0.5 tablets (12.5 mg) by mouth 2 times daily 60 tablet 0     OLANZapine (ZYPREXA) 2.5 MG tablet Take 1 tablet (2.5 mg) by mouth At Bedtime 30 tablet 1     oxyCODONE (OXYCONTIN) 10 MG 12 hr tablet Take 1 tablet (10 mg) by mouth every 12 hours maximum 2 tablet(s) per day 60 tablet 0     oxyCODONE-acetaminophen (PERCOCET) 5-325 MG tablet Take 1-2 tablets by mouth every 4 hours as needed for severe pain (max 9/day) 120 tablet 0     senna-docusate (SENOKOT-S/PERICOLACE) 8.6-50 MG tablet Take 2-4 tablets by mouth 2 times daily 60 tablet 1      Physical Exam:  General: The patient is a pleasant female. She appears moderately cachetic.  /75 (BP Location: Right arm, Patient Position: Sitting, Cuff Size: Adult Regular)   Pulse 121   Temp 98.8  F (37.1  C) (Oral)   Resp 18   Wt 55.4 kg (122 lb 1.6 oz)   SpO2 91%   BMI 22.33 kg/m     Wt Readings from Last 10 Encounters:   12/12/18 54.3 kg (119 lb 11.2 oz)   12/07/18 60.3 kg (133 lb)   12/05/18 55.4 kg (122 lb 1.6 oz)   11/28/18 56.2 kg (123 lb 14.4 oz)   11/21/18 55.4 kg (122 lb 1.6 oz)   11/20/18 54.9 kg (121 lb)   11/20/18 55.1 kg (121 lb 6.4 oz)   11/13/18 56.5 kg (124 lb 9 oz)   11/07/18 57 kg (125 lb 11.2 oz)   HEENT: EOMI, PERRL. Sclerae are anicteric. Oral mucosa is pink and mildly dry with no lesions or thrush.   Lymph: Neck is supple with no lymphadenopathy in the cervical or supraclavicular areas.   Heart: Moderately tachycardic with regular rhythm.   Lungs: Left lower lobe crackles, otherwise clear to auscultation bilaterally.   Abdomen: Bowel sounds present, soft, nontender with no palpable hepatosplenomegaly or masses.   Extremities: No lower extremity edema noted bilaterally.    Neuro: Cranial nerves II through XII are grossly intact.  Skin: No rashes, petechiae, or bruising noted on exposed skin.    Labs:   12/5/2018 11:13   Sodium 130 (L)   Potassium 4.4   Chloride 94   Carbon Dioxide 25   Urea Nitrogen 10   Creatinine 0.59   GFR Estimate >90   GFR Estimate If Black >90   Calcium 8.4 (L)   Anion Gap 11   Albumin 2.6 (L)   Protein Total 6.7 (L)   Bilirubin Total 0.4   Alkaline Phosphatase 145   ALT 18   AST 17   Glucose 81   WBC 28.7 (H)   Hemoglobin 10.8 (L)   Hematocrit 33.1 (L)   Platelet Count 372   RBC Count 3.73 (L)   MCV 89   MCH 29.0   MCHC 32.6   RDW 14.1   Diff Method Manual Differential   % Neutrophils 93.9   % Lymphocytes 1.7   % Monocytes 3.5   % Eosinophils 0.0   % Basophils 0.0   % Myelocytes 0.9   Absolute Neutrophil 26.9 (H)   Absolute Lymphocytes 0.5 (L)   Absolute Monocytes 1.0   Absolute Eosinophils 0.0   Absolute Basophils 0.0   Absolute Myelocytes 0.3 (H)   Anisocytosis Slight   Poikilocytosis Slight   Br[place] Cells Slight   Platelet Estimate Confirming automa...     Assessment and plan:  Patient will be admitted today for failure to thrive.  She has had a difficult time maintaining nutrition and hydration.  She will receive 1 L of IV fluids prior to going to the hospital.  She did have some crackles in her left lower lobe and I will obtain a chest x-ray prior to sending her over to the hospital.  Her neutrophils are elevated, but were elevated prior to her cancer diagnosis and she did receive Neulasta associated with her chemotherapy 2 weeks ago.  I would recommend that she see palliative care while in the hospital as well as meeting with the dietitian to see if we can find a nutritional supplement that she is able to tolerate.  She finds most sweet nutritional supplements difficult to tolerate and she also did not like been a calorie.  It is unclear to me if her symptoms are related to her disease or related to chemotherapy or some combination thereof.  Typically by  the time someone is 2 weeks out from their last cycle of carboplatin and Taxol, other symptoms would have been improved by now.  With her vomiting after eating and lack of bowel movement, I would consider getting an abdominal x-ray or CT if her symptoms persist.      Atiya Houston PA-C

## 2018-12-05 NOTE — PATIENT INSTRUCTIONS
[facility] Main Line: [phone]    Call triage nurse with chills and/or temperature greater than or equal to 100.4, uncontrolled nausea/vomiting, diarrhea, constipation, dizziness, shortness of breath, chest pain, bleeding, unexplained bruising, or any new/concerning symptoms, questions/concerns.   If you are having any concerning symptoms or wish to speak to a provider before your next infusion visit, please call your care coordinator or triage to notify them so we can adequately serve you.   Triage Nurse Line: [phone]    If after hours, weekends, or holidays [phone]               December 2018 Sunday Monday Tuesday Wednesday Thursday Friday Saturday                                 1       2     3     4     5     [facility] LAB DRAW    9:45 AM   (15 min.)    [facility] LAB DRAW   [facility] Lab Draw     [facility] RETURN   10:05 AM   (50 min.)   Atiya Houston PA-C   [facility] ONC INFUSION 120   11:30 AM   (120 min.)    ONCOLOGY INFUSION   [facility]     [facility] NEW   12:30 PM   (75 min.)   Linus Tolentino MD   [facility]     XR CHEST 2 VIEWS    3:30 PM   (15 min.)   UCXR1   [facility] Xray     6     7     8       9     10     11     12     [facility] LAB DRAW    9:00 AM   (15 min.)    [facility] LAB DRAW   [facility] Lab Draw     [facility] RETURN    9:15 AM   (50 min.)   Atiya Houston PA-C   [facility] ONC INFUSION 180   10:00 AM   (180 min.)    ONCOLOGY INFUSION   [facility] 13     14     15       16     17     18     19     20     21     22       23     24     25     26     27     28     29       30     31 January 2019 Sunday Monday Tuesday Wednesday Thursday Friday Saturday             1     2     3     4     5       6     7     8     9     10     11     12       13     14     15     16      17     18     19       20     21     22     23     24     25     26       27     28     29     30     31                            Lab Results:  Recent Results (from the past 12 hour(s))   Comprehensive metabolic panel    Collection Time: 12/05/18 11:13 AM   Result Value Ref Range    Sodium 130 (L) 133 - 144 mmol/L    Potassium 4.4 3.4 - 5.3 mmol/L    Chloride 94 94 - 109 mmol/L    Carbon Dioxide 25 20 - 32 mmol/L    Anion Gap 11 3 - 14 mmol/L    Glucose 81 70 - 99 mg/dL    Urea Nitrogen 10 7 - 30 mg/dL    Creatinine 0.59 0.52 - 1.04 mg/dL    GFR Estimate >90 >60 mL/min/1.7m2    GFR Estimate If Black >90 >60 mL/min/1.7m2    Calcium 8.4 (L) 8.5 - 10.1 mg/dL    Bilirubin Total 0.4 0.2 - 1.3 mg/dL    Albumin 2.6 (L) 3.4 - 5.0 g/dL    Protein Total 6.7 (L) 6.8 - 8.8 g/dL    Alkaline Phosphatase 145 40 - 150 U/L    ALT 18 0 - 50 U/L    AST 17 0 - 45 U/L   CBC with platelets differential    Collection Time: 12/05/18 11:13 AM   Result Value Ref Range    WBC 28.7 (H) 4.0 - 11.0 10e9/L    RBC Count 3.73 (L) 3.8 - 5.2 10e12/L    Hemoglobin 10.8 (L) 11.7 - 15.7 g/dL    Hematocrit 33.1 (L) 35.0 - 47.0 %    MCV 89 78 - 100 fl    MCH 29.0 26.5 - 33.0 pg    MCHC 32.6 31.5 - 36.5 g/dL    RDW 14.1 10.0 - 15.0 %    Platelet Count 372 150 - 450 10e9/L    Diff Method Manual Differential     % Neutrophils 93.9 %    % Lymphocytes 1.7 %    % Monocytes 3.5 %    % Eosinophils 0.0 %    % Basophils 0.0 %    % Myelocytes 0.9 %    Absolute Neutrophil 26.9 (H) 1.6 - 8.3 10e9/L    Absolute Lymphocytes 0.5 (L) 0.8 - 5.3 10e9/L    Absolute Monocytes 1.0 0.0 - 1.3 10e9/L    Absolute Eosinophils 0.0 0.0 - 0.7 10e9/L    Absolute Basophils 0.0 0.0 - 0.2 10e9/L    Absolute Myelocytes 0.3 (H) 0 10e9/L    Anisocytosis Slight     Poikilocytosis Slight     Brandi Cells Slight     Platelet Estimate Confirming automated cell count

## 2018-12-05 NOTE — IP AVS SNAPSHOT
[address] [address]    Phone:  [phone]                                       After Visit Summary   12/5/2018    Barbie Deluca    MRN: [id number]           After Visit Summary Signature Page     I have received my discharge instructions, and my questions have been answered. I have discussed any challenges I see with this plan with the nurse or doctor.    ..........................................................................................................................................  Patient/Patient Representative Signature      ..........................................................................................................................................  Patient Representative Print Name and Relationship to Patient    ..................................................               ................................................  Date                                   Time    ..........................................................................................................................................  Reviewed by Signature/Title    ...................................................              ..............................................  Date                                               Time          22EPIC Rev 08/18

## 2018-12-05 NOTE — Clinical Note
12/5/2018       RE: Barbie Deluca  [address] [facility] [address]     Dear Colleague,    Thank you for referring your patient, Barbie Deluca, to the [facility]. Please see a copy of my visit note below.    Interval History:  Patient reports that she feels tired she spends her entire day laying down and is in and out of catnaps.  She is not really eating.  Yesterday she ate 6 spoonfuls of broth, tomato soup, and Ensure combined.  She is drinking about 12 ounces of fluids per day.  She tried Marinol last week, but did not notice a benefit and felt it made her tired.  She stopped the senna as she felt it was contributing to gas pains.  She did get some relief from Gas-X.  Her last bowel movement was about a week ago.  She is continuing to pass gas.  She does have mild abdominal gas pain now.  She reports some hot flashes, but no fevers or chills.  She denies chest pain.  She does still have dyspnea on exertion, but does feel a little less winded than she did prior to starting chemotherapy.  She has mild nausea and does vomit up most of her foods.  She does have ups and downs in terms of how she feels overall, but her family feels the general trajectory has been that she has been declining, in particular over the last 3-4 days.  She is urinating about 2-3 times a day and her urine is very concentrated.  She reports that her feet have been more swollen over the last 4 days.  She has been continuing to use ice packs for her back and had.  She was getting headaches from 8 mg Zofran and so has decreased the Zofran to 4 mg today.  Thus far, she does not have a headache.  She describes her mood is frustrated.  It is unclear if her symptoms seem to get worse with chemotherapy.  She saw her primary care provider yesterday who prescribed her 7.5 mg mirtazapine to take to try to help her appetite.  She reports that her blood pressure was elevated when she was seeing her primary  care provider, but improved later on.  She was unclear on why she was taking Claritin which we discussed was for Neulasta associated body aches.  Last week, after her appointment with me she took Marinol and then went home and took a Percocet.  Her family felt she was quite sedated with that combination.  She is taking Ativan at night to help her sleep.  She has not been taking any MiraLAX.  She feels Compazine made her tired, dizzy, and seemed to worsen her nausea.  Her pain is fairly well controlled on OxyContin and Percocet 4-5 tablets/day.  She denies other concerns.      Remainder of review of systems is otherwise negative.        Assessment and plan:  Patient will be admitted today for failure to thrive.  She has had a difficult time maintaining nutrition and hydration.  She will receive 1 L of IV fluids prior to going to the hospital.  She did have some crackles in her left lower lobe and I will obtain a chest x-ray prior to sending her over to the hospital.  Her neutrophils are elevated, but were elevated prior to her cancer diagnosis and she did receive Neulasta associated with her chemotherapy 2 weeks ago.  I would recommend that she see palliative care while in the hospital as well as meeting with the dietitian to see if we can find a nutritional supplement that she is able to tolerate.  She finds most sweet nutritional supplements difficult to tolerate and she also did not like been a calorie.  It is unclear to me if her symptoms are related to her disease or related to chemotherapy or some combination thereof.  Typically by the time someone is 2 weeks out from their last cycle of carboplatin and Taxol, other symptoms would have been improved by now.  With her vomiting after eating and lack of bowel movement, I would consider getting an abdominal x-ray or CT if her symptoms persist.    Atiya Houston PA-C  [facility]  [address], [place] [id number] [phone]      Again, thank  you for allowing me to participate in the care of your patient.      Sincerely,    Atiya Houston PA-C

## 2018-12-05 NOTE — PROGRESS NOTES
Infusion Nursing Note:  Barbie Deluca presents today for IVF.    Patient seen by provider today: Yes: Akshat    present during visit today: Not Applicable.    Note: Patient reported to clinic after seeing provider. Patient generally not feeling well, tired and has generalized body pain, rating at a 2. Patient declines any interventions for pain at this time. Given warm blanket and used positioning to help with pain. IVF given over 1.5 hours via port. Patient stated after she felt a little better, she slept through infusion. Patient to go to XRay after infusion for chest films per Akshat Allan PA-C before heading over to hospital. Family will take her down and then take her over by shuttle, ok'd by Akshat Allan PA-C. Report called to Nurse on 7D, Jen.      Intravenous Access:  Implanted Port.    Treatment Conditions:  Lab Results   Component Value Date    HGB 10.8 12/05/2018     Lab Results   Component Value Date    WBC 28.7 12/05/2018      Lab Results   Component Value Date    ANEU 26.9 12/05/2018     Lab Results   Component Value Date     12/05/2018      Lab Results   Component Value Date     12/05/2018                   Lab Results   Component Value Date    POTASSIUM 4.4 12/05/2018           No results found for: MAG         Lab Results   Component Value Date    CR 0.59 12/05/2018                   Lab Results   Component Value Date    PANTERA 8.4 12/05/2018                Lab Results   Component Value Date    BILITOTAL 0.4 12/05/2018           Lab Results   Component Value Date    ALBUMIN 2.6 12/05/2018                    Lab Results   Component Value Date    ALT 18 12/05/2018           Lab Results   Component Value Date    AST 17 12/05/2018           Post Infusion Assessment:  Patient tolerated infusion without incident.  Blood return noted pre and post infusion.  Site patent and intact, free from redness, edema or discomfort.  No evidence of extravasations.  PORT  flushed with heparin and left in for hospital admit.    Discharge Plan:   Patient declined prescription refills.  Discharge instructions reviewed with: Patient and Family.  Patient and/or family verbalized understanding of discharge instructions and all questions answered.  Copy of AVS reviewed with patient and/or family.  Patient will return 12/25/18  for next appointment.  AVS to patient via [employer].  Patient will return 12/25/18 for next appointment.   Patient discharged in stable condition accompanied by: self, son and daughter.  Departure Mode: Ambulatory.  Face to Face time: 0 minutes.    Andres Olmos RN

## 2018-12-05 NOTE — MR AVS SNAPSHOT
After Visit Summary   12/5/2018    Barbie Deluca    MRN: [id number]           Patient Information     Date Of Birth          1940        Visit Information        Provider Department      12/5/2018 11:30 AM  14 ATC;  ONCOLOGY INFUSION [facility]        Today's Diagnoses     Non-small cell cancer of left lung (H)    -  1      [facility] [place] Main Line: [phone]    Call triage nurse with chills and/or temperature greater than or equal to 100.4, uncontrolled nausea/vomiting, diarrhea, constipation, dizziness, shortness of breath, chest pain, bleeding, unexplained bruising, or any new/concerning symptoms, questions/concerns.   If you are having any concerning symptoms or wish to speak to a provider before your next infusion visit, please call your care coordinator or triage to notify them so we can adequately serve you.   Triage Nurse Line: [phone]    If after hours, weekends, or holidays [phone]               December 2018 Sunday Monday Tuesday Wednesday Thursday Friday Saturday                                 1       2     3     4     5     [facility] DRAW    9:45 AM   (15 min.)   [facility]   [facility] Lab Draw     [facility] RETURN   10:05 AM   (50 min.)   Atiya Houston PA-C   [facility] ONC INFUSION 120   11:30 AM   (120 min.)    ONCOLOGY INFUSION   [facility] NEW   12:30 PM   (75 min.)   Linus Tolentino MD   [facility]     XR CHEST 2 VIEWS    3:30 PM   (15 min.)   UCXR1   [facility] Xray     6     7     8       9     10     11     12     [facility] DRAW    9:00 AM   (15 min.)   [facility]   [facility] Lab Draw     [facility] RETURN    9:15 AM   (50 min.)   Atiya Houston PA-C   [facility] ONC INFUSION 180   10:00 AM   (180 min.)    ONCOLOGY  [facility] 13     14     15       16     17     18     19     20     21     22       23     24     25     26     27     28     29       30     31 January 2019 Sunday Monday Tuesday Wednesday Thursday Friday Saturday             1     2     3     4     5       6     7     8     9     10     11     12       13     14     15     16     17     18     19       20     21     22     23     24     25     26       27     28     29     30     31                            Lab Results:  Recent Results (from the past 12 hour(s))   Comprehensive metabolic panel    Collection Time: 12/05/18 11:13 AM   Result Value Ref Range    Sodium 130 (L) 133 - 144 mmol/L    Potassium 4.4 3.4 - 5.3 mmol/L    Chloride 94 94 - 109 mmol/L    Carbon Dioxide 25 20 - 32 mmol/L    Anion Gap 11 3 - 14 mmol/L    Glucose 81 70 - 99 mg/dL    Urea Nitrogen 10 7 - 30 mg/dL    Creatinine 0.59 0.52 - 1.04 mg/dL    GFR Estimate >90 >60 mL/min/1.7m2    GFR Estimate If Black >90 >60 mL/min/1.7m2    Calcium 8.4 (L) 8.5 - 10.1 mg/dL    Bilirubin Total 0.4 0.2 - 1.3 mg/dL    Albumin 2.6 (L) 3.4 - 5.0 g/dL    Protein Total 6.7 (L) 6.8 - 8.8 g/dL    Alkaline Phosphatase 145 40 - 150 U/L    ALT 18 0 - 50 U/L    AST 17 0 - 45 U/L   CBC with platelets differential    Collection Time: 12/05/18 11:13 AM   Result Value Ref Range    WBC 28.7 (H) 4.0 - 11.0 10e9/L    RBC Count 3.73 (L) 3.8 - 5.2 10e12/L    Hemoglobin 10.8 (L) 11.7 - 15.7 g/dL    Hematocrit 33.1 (L) 35.0 - 47.0 %    MCV 89 78 - 100 fl    MCH 29.0 26.5 - 33.0 pg    MCHC 32.6 31.5 - 36.5 g/dL    RDW 14.1 10.0 - 15.0 %    Platelet Count 372 150 - 450 10e9/L    Diff Method Manual Differential     % Neutrophils 93.9 %    % Lymphocytes 1.7 %    % Monocytes 3.5 %    % Eosinophils 0.0 %    % Basophils 0.0 %    % Myelocytes 0.9 %    Absolute Neutrophil 26.9 (H) 1.6 - 8.3 10e9/L    Absolute Lymphocytes 0.5 (L) 0.8 - 5.3 10e9/L    Absolute  Monocytes 1.0 0.0 - 1.3 10e9/L    Absolute Eosinophils 0.0 0.0 - 0.7 10e9/L    Absolute Basophils 0.0 0.0 - 0.2 10e9/L    Absolute Myelocytes 0.3 (H) 0 10e9/L    Anisocytosis Slight     Poikilocytosis Slight     Erin Cells Slight     Platelet Estimate Confirming automated cell count                Follow-ups after your visit        Your next 10 appointments already scheduled     Dec 05, 2018  3:30 PM CST   XR CHEST 2 VIEWS with UCXR1   [facility] Xray ([facility])    [address]  1st Floor  [facility] [id number] [phone]           How do I prepare for my exam? (Food and drink instructions) No Food and Drink Restrictions.  How do I prepare for my exam? (Other instructions) You do not need to do anything special for this exam.  What should I wear: Wear comfortable clothes.  How long does the exam take: Most scans take less than 5 minutes.  What should I bring: Bring a list of your medicines, including vitamins, minerals and over-the-counter drugs. It is safest to leave personal items at home.  Do I need a :  No  is needed.  What do I need to tell my doctor: Tell your doctor if there s any chance you are pregnant.  What should I do after the exam: No restrictions, You may resume normal activities.  What is this test: An image of a specific body part shown in shades of black and white.  Who should I call with questions: If you have any questions, please call the Imaging Department where you will have your exam. Directions, parking instructions, and other information is available on our website, [email or web address].            Dec 12, 2018  9:00 AM CST   [facility] Draw with  [facility] DRAW   [facility] Draw ([facility])    [address]  [address]  [facility] [id number] [phone]            Dec 12, 2018  9:30 AM CST   (Arrive by 9:15 AM)   Return Visit with Atiya Houston PA-C   [facility] ([facility])    [address]  [address]  [facility] [id number] [phone]            Dec 12, 2018 10:00 AM CST   Infusion 180 with UC ONCOLOGY INFUSION, UC 28 ATC   [facility] ([facility])    [address]  [address]  [facility] [place] [id number] [phone]              Who to contact     If you have questions or need follow up information about today's clinic visit or your schedule please contact [facility] directly at [phone].  Normal or non-critical lab and imaging results will be communicated to you by [employer], letter or phone within 4 business days after the clinic has received the results. If you do not hear from us within 7 days, please contact the clinic through [employer] or phone. If you have a critical or abnormal lab result, we will notify you by phone as soon as possible.  Submit refill requests through [employer] or call your pharmacy and they will forward the refill request to us. Please allow 3 business days for your refill to be completed.          Additional Information About Your Visit        [employer] Information     [employer] gives you secure access to your electronic health record. If you see a primary care provider, you can also send messages to your care team and make appointments. If you have questions, please call your primary care clinic.  If you do not have a primary care provider, please call [phone] and they will assist you.        Care EveryWhere ID     This is your Care EveryWhere ID. This could be used by other organizations to access your [place] medical records  [id number]         Blood Pressure from Last 3 Encounters:   12/05/18 117/75   11/28/18 129/79   11/21/18 124/66    Weight from Last 3 Encounters:   12/05/18 55.4 kg (122 lb 1.6 oz)   11/28/18 56.2 kg (123 lb 14.4 oz)   11/21/18 55.4 kg (122 lb 1.6 oz)              Today, you had the  following     No orders found for display         Today's Medication Changes          These changes are accurate as of 12/5/18  2:15 PM.  If you have any questions, ask your nurse or doctor.               These medicines have changed or have updated prescriptions.        Dose/Directions    ondansetron 8 MG tablet   Commonly known as:  ZOFRAN   This may have changed:    - how much to take  - Another medication with the same name was removed. Continue taking this medication, and follow the directions you see here.   Used for:  Non-small cell cancer of left lung (H), Nausea   Changed by:  Atiya Houston PA-C        Dose:  4 mg   Take 0.5 tablets (4 mg) by mouth every 8 hours as needed for nausea   Refills:  0         Stop taking these medicines if you haven't already. Please contact your care team if you have questions.     dronabinol 2.5 MG capsule   Commonly known as:  MARINOL   Stopped by:  Atiya Houston PA-C           prochlorperazine 10 MG tablet   Commonly known as:  COMPAZINE   Stopped by:  Atiya Houston PA-C           senna-docusate 8.6-50 MG tablet   Commonly known as:  SENOKOT-S/PERICOLACE   Stopped by:  Atiya Houston PA-C                    Primary Care Provider Office Phone # Fax #    Erik Reaves [phone]       [facility] [place] [address] [place] [facility] [address]        Equal Access to Services     [facility]: Hadii trevin martinso Sodasha, waaxda luqadaha, qaybta kaalmada jonnyada, gita guevara . So [facility] [phone].    ATENCIÓN: Si habla español, tiene a alarcon disposición servicios gratuitos de asistencia lingüística. Hardeepame al [phone].    We comply with applicable federal civil rights laws and [place] laws. We do not discriminate on the basis of race, color, national origin, age, disability, sex, sexual orientation, or gender identity.            Thank you!     Thank you for choosing [facility]  for your care. Our goal is always to provide you with excellent care. Hearing back from our patients is one way we can continue to improve our services. Please take a few minutes to complete the written survey that you may receive in the mail after your visit with us. Thank you!             Your Updated Medication List - Protect others around you: Learn how to safely use, store and throw away your medicines at www.disposemymeds.org.          This list is accurate as of 12/5/18  2:15 PM.  Always use your most recent med list.                   Brand Name Dispense Instructions for use Diagnosis    CLARITIN 10 MG tablet   Generic drug:  loratadine      Take 1 tablet (10 mg) by mouth daily Take day of chemotherapy and for 4 days afterwards.        LORazepam 0.5 MG tablet    ATIVAN    30 tablet    Take 1 tablet (0.5 mg) by mouth every 4 hours as needed (Anxiety, Nausea/Vomiting or Sleep)    Non-small cell cancer of left lung (H), Encounter for antineoplastic chemotherapy, Encounter for other specified aftercare       losartan 50 MG tablet    COZAAR     TK 1 T PO D        mirtazapine 7.5 MG tablet    REMERON     Take 1 tablet (7.5 mg) by mouth At Bedtime        ondansetron 8 MG tablet    ZOFRAN     Take 0.5 tablets (4 mg) by mouth every 8 hours as needed for nausea    Non-small cell cancer of left lung (H), Nausea       oxyCODONE 10 MG 12 hr tablet    oxyCONTIN    60 tablet    Take 1 tablet (10 mg) by mouth every 12 hours maximum 2 tablet(s) per day    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), Non-small cell cancer of left lung (H), Other constipation       oxyCODONE-acetaminophen 5-325 MG tablet    PERCOCET    50 tablet    Take 1 tablet by mouth every 6 hours as needed for severe pain    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), Non-small cell cancer of left lung (H), Other constipation       polyethylene glycol packet    MIRALAX/GLYCOLAX    7 packet    Take 17 g by mouth daily     Non-small cell cancer of left lung (H), 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), Other constipation

## 2018-12-05 NOTE — PROGRESS NOTES
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  CT scan on 10/24/2018 showed a cystic mass involving the left side of the mediastinum extending from the left hilum towards the cardiac apex, abutting the pericardium over a long extent peripherally.  Enhancing soft tissue element as well surrounding atelectatic lingula.  MRA brain 10/31/2018 showed no brain metastases.  PET/CT scan 11/13/2018 and CT-guided biopsy 11/01/2018 consistent with squamous cell carcinoma. She started on neoadjuvant treatment with carboplatin and Taxol on 11/21/18.  Plan is to complete 2-3 cycles followed by a CT scan and a plan for sequential radiation followed by maintenance immune therapy. She comes in today for routine follow up.    Interval History:  Patient reports that she feels tired she spends her entire day laying down and is in and out of catnaps.  She is not really eating.  Yesterday she ate 6 spoonfuls of broth, tomato soup, and Ensure combined.  She is drinking about 12 ounces of fluids per day.  She tried Marinol last week, but did not notice a benefit and felt it made her tired.  She stopped the senna as she felt it was contributing to gas pains.  She did get some relief from Gas-X.  Her last bowel movement was about a week ago.  She is continuing to pass gas.  She does have mild abdominal gas pain now.  She reports some hot flashes, but no fevers or chills.  She denies chest pain.  She does still have dyspnea on exertion, but does feel a little less winded than she did prior to starting chemotherapy.  She has mild nausea and does vomit up most of her foods.  She does have ups and downs in terms of how she feels overall, but her family feels the general trajectory has been that she has been declining, in  particular over the last 3-4 days.  She is urinating about 2-3 times a day and her urine is very concentrated.  She reports that her feet have been more swollen over the last 4 days.  She has been continuing to use ice packs for her back and had.  She was getting headaches from 8 mg Zofran and so has decreased the Zofran to 4 mg today.  Thus far, she does not have a headache.  She describes her mood is frustrated.  It is unclear if her symptoms seem to get worse with chemotherapy.  She saw her primary care provider yesterday who prescribed her 7.5 mg mirtazapine to take to try to help her appetite.  She reports that her blood pressure was elevated when she was seeing her primary care provider, but improved later on.  She was unclear on why she was taking Claritin which we discussed was for Neulasta associated body aches.  Last week, after her appointment with me she took Marinol and then went home and took a Percocet.  Her family felt she was quite sedated with that combination.  She is taking Ativan at night to help her sleep.  She has not been taking any MiraLAX.  She feels Compazine made her tired, dizzy, and seemed to worsen her nausea.  Her pain is fairly well controlled on OxyContin and Percocet 4-5 tablets/day.  She denies other concerns.      Remainder of review of systems is otherwise negative.    Current Outpatient Medications   Medication Sig Dispense Refill     loratadine (CLARITIN) 10 MG tablet Take 1 tablet (10 mg) by mouth daily Take day of chemotherapy and for 4 days afterwards.       LORazepam (ATIVAN) 0.5 MG tablet Take 1 tablet (0.5 mg) by mouth every 4 hours as needed (Anxiety, Nausea/Vomiting or Sleep) 30 tablet 2     losartan (COZAAR) 50 MG tablet take 1 tablet by mouth daily  11     mirtazapine (REMERON) 7.5 MG tablet Take 1 tablet (7.5 mg) by mouth At Bedtime       ondansetron (ZOFRAN) 8 MG tablet Take 0.5 tablets (4 mg) by mouth every 8 hours as needed for nausea       polyethylene glycol  (MIRALAX/GLYCOLAX) Packet Take 17 g by mouth daily 7 packet 3     dexamethasone (DECADRON) 4 MG tablet Take 4 mg by mouth daily. 30 tablet 1     metoprolol tartrate (LOPRESSOR) 25 MG tablet Take 0.5 tablets (12.5 mg) by mouth 2 times daily 60 tablet 0     OLANZapine (ZYPREXA) 2.5 MG tablet Take 1 tablet (2.5 mg) by mouth At Bedtime 30 tablet 1     oxyCODONE (OXYCONTIN) 10 MG 12 hr tablet Take 1 tablet (10 mg) by mouth every 12 hours maximum 2 tablet(s) per day 60 tablet 0     oxyCODONE-acetaminophen (PERCOCET) 5-325 MG tablet Take 1-2 tablets by mouth every 4 hours as needed for severe pain (max 9/day) 120 tablet 0     senna-docusate (SENOKOT-S/PERICOLACE) 8.6-50 MG tablet Take 2-4 tablets by mouth 2 times daily 60 tablet 1      Physical Exam:  General: The patient is a pleasant female. She appears moderately cachetic.  /75 (BP Location: Right arm, Patient Position: Sitting, Cuff Size: Adult Regular)   Pulse 121   Temp 98.8  F (37.1  C) (Oral)   Resp 18   Wt 55.4 kg (122 lb 1.6 oz)   SpO2 91%   BMI 22.33 kg/m    Wt Readings from Last 10 Encounters:   12/12/18 54.3 kg (119 lb 11.2 oz)   12/07/18 60.3 kg (133 lb)   12/05/18 55.4 kg (122 lb 1.6 oz)   11/28/18 56.2 kg (123 lb 14.4 oz)   11/21/18 55.4 kg (122 lb 1.6 oz)   11/20/18 54.9 kg (121 lb)   11/20/18 55.1 kg (121 lb 6.4 oz)   11/13/18 56.5 kg (124 lb 9 oz)   11/07/18 57 kg (125 lb 11.2 oz)   HEENT: EOMI, PERRL. Sclerae are anicteric. Oral mucosa is pink and mildly dry with no lesions or thrush.   Lymph: Neck is supple with no lymphadenopathy in the cervical or supraclavicular areas.   Heart: Moderately tachycardic with regular rhythm.   Lungs: Left lower lobe crackles, otherwise clear to auscultation bilaterally.   Abdomen: Bowel sounds present, soft, nontender with no palpable hepatosplenomegaly or masses.   Extremities: No lower extremity edema noted bilaterally.   Neuro: Cranial nerves II through XII are grossly intact.  Skin: No rashes, petechiae,  or bruising noted on exposed skin.    Labs:   12/5/2018 11:13   Sodium 130 (L)   Potassium 4.4   Chloride 94   Carbon Dioxide 25   Urea Nitrogen 10   Creatinine 0.59   GFR Estimate >90   GFR Estimate If Black >90   Calcium 8.4 (L)   Anion Gap 11   Albumin 2.6 (L)   Protein Total 6.7 (L)   Bilirubin Total 0.4   Alkaline Phosphatase 145   ALT 18   AST 17   Glucose 81   WBC 28.7 (H)   Hemoglobin 10.8 (L)   Hematocrit 33.1 (L)   Platelet Count 372   RBC Count 3.73 (L)   MCV 89   MCH 29.0   MCHC 32.6   RDW 14.1   Diff Method Manual Differential   % Neutrophils 93.9   % Lymphocytes 1.7   % Monocytes 3.5   % Eosinophils 0.0   % Basophils 0.0   % Myelocytes 0.9   Absolute Neutrophil 26.9 (H)   Absolute Lymphocytes 0.5 (L)   Absolute Monocytes 1.0   Absolute Eosinophils 0.0   Absolute Basophils 0.0   Absolute Myelocytes 0.3 (H)   Anisocytosis Slight   Poikilocytosis Slight   Callaway Cells Slight   Platelet Estimate Confirming automa...     Assessment and plan:  Patient will be admitted today for failure to thrive.  She has had a difficult time maintaining nutrition and hydration.  She will receive 1 L of IV fluids prior to going to the hospital.  She did have some crackles in her left lower lobe and I will obtain a chest x-ray prior to sending her over to the hospital.  Her neutrophils are elevated, but were elevated prior to her cancer diagnosis and she did receive Neulasta associated with her chemotherapy 2 weeks ago.  I would recommend that she see palliative care while in the hospital as well as meeting with the dietitian to see if we can find a nutritional supplement that she is able to tolerate.  She finds most sweet nutritional supplements difficult to tolerate and she also did not like been a calorie.  It is unclear to me if her symptoms are related to her disease or related to chemotherapy or some combination thereof.  Typically by the time someone is 2 weeks out from their last cycle of carboplatin and Taxol, other  symptoms would have been improved by now.  With her vomiting after eating and lack of bowel movement, I would consider getting an abdominal x-ray or CT if her symptoms persist.    Atiya Houston PA-C  [facility] [address] [address] [phone]

## 2018-12-05 NOTE — H&P
[facility], [place]  History and Physical  Hematology / Oncology     Date of Admission: 12/5/2018    Assessment & Plan   Barbie Deluca is a 78 year old female with PMH of hypertension and recently diagnosed squamous cell carcinoma of the left lung who is admitted with weakness, nausea/vomiting, and pain.    #Generalized weakness  #Anorexia with associated weight loss (malnutrition present, severity unspecified)  #Dehydration  -Generally has declined since receiving first cycle of chemo on 11/21. Sleeping most of the day, tolerating just a few sips of liquids, vomits after eating. Saw PCP 12/4 who prescribed Remeron 7.5 at bedtime, took it last night along with Ativan and oxycodone, so unclear if it was beneficial. May also consider short course of dexamethasone for nausea, appetite, energy level.  -Consider abd XR due to constipation and post-prandial vomiting  -Consult RD - has not been able to find nutritional supplement/shake that she enjoys (too sweet), poor PO intake, weight loss  -PT/OT consult  -Palliative care consult for assistance with symptom management, appreciate their assistance  -Check UA/UC to rule out infection. CXR as below.    #Nausea/vomiting  #Constipation  -Nausea/vomiting may be secondary to extent of tumor involvement vs continued chemo effect vs other. Had brain MRI as part of staging workup on 10/31 and it was negative for metastasis. If not improving with medical therapy, consider repeating brain MRI.  -Does not like taste of ODT Zofran, has been using 4mg Zofran at home because she feels 8mg dose gives her headaches. Tried Compazine but it made her more tired, dizzy, and felt it made her nausea worse. Using Ativan at night for sleep.  -Will continue lower dose Zofran PRN, may try low dose Ativan for nausea. Consider scheduling Zofran and/or adding low-dose Zyprexa, although would be cautious with potential sedation. May also consider dexamethasone  "as above.  -Obtain 2-view abd XR to rule out obstruction as cause of symptoms (reports passing flatus, but no BM x 7 days)  -Consult GI for possible endoscopy in the morning    #Tachycardia  -Check EKG, may be due to dehydration but also need to consider A.fib with RVR, possible PE (also newly hypoxic). Hospitalist to follow up on this and will order CT for PE protocol if needed.    #Cancer-related pain  -Having significant pain, mostly located in area of tumor on L side of chest and underneath L breast. Also reports \"all over\" pain in body, suspect secondary to Neulasta.   -Recently started OxyContin 10mg BID with Percocet 5-325mg approx 4 times per day. Will continue OxyContin, and use Oxycodone 5mg q4h PRN along with IV Dilaudid as backup (0.2mg q2h PRN). Add topical ketamine-gabapentin-lidocaine cream TID for localized L chest pain. Schedule Tylenol 650mg TID.    #Leukocytosis  #Anemia  -Suspect leukocytosis is due to Neulasta, may be a component of infection as well. Anemia likely secondary to underlying disease and chemotherapy.  -No transfusions needed at this time    #Possible pneumonia of LLL  -Noted with crackles on exam, new intermittent cough. Question infiltrate on CXR. Will continue to observe as she is not febrile, only slightly hypoxic, and cough is very infrequent. If symptoms worsening, will start antibiotics.    #Hyponatremia  -Present on recent labs, as low as 127. Suspect dehydration +/- paraneoplastic syndrome. Giving IVF, will check urine studies.    #Squamous cell carcinoma of the L lung (T4NXM0 or possibly M1)  -Follows with Dr. Solares.  -Presented with left arm pain. CT scan 10/24/2018:  A cystic mass involving the left side of the mediastinum extending from the left hilum towards the cardiac apex, abutting the pericardium over a long extent peripherally.  Enhancing soft tissue element as well surrounding atelectatic lingula.  MRA brain 10/31/2018, no brain metastases.  PET/CT scan 11/13/2018 " "and CT-guided biopsy 11/01/2018 c/w squamous cell carcinoma.   -Treatment plan was for 2-3 cycles of neoadjuvant Carbo/Taxol followed by CT scan and sequential radiation, followed by maintenance immune therapy. Received C1D1 Carbo/Taxol on 11/21/18 with Neulasta support.    #Lower extremity edema  -Very mild, but new per patient and her daughter. Suspect secondary to malnutrition (albumin 2.6).     #FEN  -MIVF at 100ml/hr  -Lyte replacement PRN per protocol  -NPO for possible EGD in the AM    #Prophy  -Lovenox 40mg daily    Code Status   Full Code    Disposition Plan   Expected discharge: 2 - 3 days, recommended to prior living arrangement vs TCU once symptoms improved.     Entered: Daniela Araujo 12/05/2018, 4:40 PM        Patient and plan of care discussed with staff attending, Dr. Bell.     Daniela Araujo PA-C  Hematology/Oncology  [phone]    Primary Care Physician   Erik Reaves    Chief Complaint   Weakness    History is obtained from the patient and review of electronic medical record    History of Present Illness   Barbie Deluca is a 78 year old female with PMH of hypertension and recently diagnosed squamous cell carcinoma of the left lung who presents with weakness, nausea/vomiting, and pain. She reports she has generally been feeling poorly for the past 6 months, family reports some of these symptoms are what led her to seek medical evaluation and obtain initial cancer diagnosis. Got chemo 11/21 (Carbo/Taxol). Has not been doing well since then, symptoms have gotten progressively worse over the past week. Lays down most of the day with frequent naps. Poor appetite, tries to drink sips of liquids or soups and reports \"they come right back up\".  Has tried a few different supplements/shakes but feels they are too swee. Notes vomiting after eating. She occasionally has some nausea. Has tried Zofran (got headaches) and Compazine (reports fatigue, dizziness, and worsened nausea). Using Ativan " "for sleep. Did see PCP yesterday for similar issues and was prescribed Remeron. She tried it last night, but also took Ativan and oxycodone at the same time. Daughter reports that Barbie slept, but did have an episode where it seemed like she \"blacked out\" after taking these. Urinating infrequently and reports urine is concentrated. Unsure if urine has had blood in it. More constipated, reports last BM was 6-7 days ago. She states she is passing flatus, but daughter reports she has to massage abdomen in order for her to pass gas.     Past Medical History    I have reviewed this patient's medical history and updated it with pertinent information if needed.   Past Medical History:   Diagnosis Date     Hypertension        Past Surgical History   I have reviewed this patient's surgical history and updated it with pertinent information if needed.  Past Surgical History:   Procedure Laterality Date     INSERT PORT VASCULAR ACCESS Right 11/20/2018    Procedure: Chest Port Placement;  Surgeon: Anca Mock PA-C;  Location: [facility] OR       Prior to Admission Medications   Prior to Admission Medications   Prescriptions Last Dose Informant Patient Reported? Taking?   LORazepam (ATIVAN) 0.5 MG tablet   No No   Sig: Take 1 tablet (0.5 mg) by mouth every 4 hours as needed (Anxiety, Nausea/Vomiting or Sleep)   loratadine (CLARITIN) 10 MG tablet   Yes No   Sig: Take 1 tablet (10 mg) by mouth daily Take day of chemotherapy and for 4 days afterwards.   losartan (COZAAR) 50 MG tablet   Yes No   Sig: TK 1 T PO D   mirtazapine (REMERON) 7.5 MG tablet   Yes No   Sig: Take 1 tablet (7.5 mg) by mouth At Bedtime   ondansetron (ZOFRAN) 8 MG tablet   Yes No   Sig: Take 0.5 tablets (4 mg) by mouth every 8 hours as needed for nausea   oxyCODONE (OXYCONTIN) 10 MG 12 hr tablet   No No   Sig: Take 1 tablet (10 mg) by mouth every 12 hours maximum 2 tablet(s) per day   oxyCODONE-acetaminophen (PERCOCET) 5-325 MG per tablet   No No   Sig: Take 1 " tablet by mouth every 6 hours as needed for severe pain   polyethylene glycol (MIRALAX/GLYCOLAX) Packet   No No   Sig: Take 17 g by mouth daily      Facility-Administered Medications: None     Allergies   Allergies   Allergen Reactions     No Clinical Screening - See Comments Rash     Nicotine Patch       Social History   I have reviewed this patient's social history and updated it with pertinent information if needed. Barbie Deluca  reports that she has been smoking Cigarettes.  She started smoking about 58 years ago. She has been smoking about 0.50 packs per day. She has never used smokeless tobacco. She reports that she does not drink alcohol or use illicit drugs.    Family History   I have reviewed this patient's family history and updated it with pertinent information if needed.     Review of Systems   The 10 point Review of Systems is negative other than noted in the HPI.    Physical Exam   Temp: 98.7  F (37.1  C) Temp src: Oral BP: (!) 145/91 Pulse: 119 Heart Rate: 112 Resp: 18 SpO2: 92 % O2 Device: Nasal cannula Oxygen Delivery: 2 LPM  Vital Signs with Ranges  Temp:  [98.7  F (37.1  C)-98.8  F (37.1  C)] 98.7  F (37.1  C)  Pulse:  [119-121] 119  Heart Rate:  [112] 112  Resp:  [18] 18  BP: (117-148)/(75-94) 145/91  SpO2:  [88 %-92 %] 92 %  122 lbs 2.16 oz    Constitutional: Thin, elderly female seen lying in bed. Appears chronically ill, fatigued.  Eyes: Lids and lashes normal, pupils equal, round and reactive to light, extra ocular muscles intact, sclera clear, conjunctiva normal.  ENT: Normocephalic, without obvious abnormality, atraumatic, oral pharynx with moist mucus membranes, tonsils without erythema or exudates. No thrush or lesions. Upper denture in place.  Respiratory: Oxygen in place via NC. Increased work of breathing is noted with exertion. Crackles auscultated to LLL.   Cardiovascular: Tachycardic. Regular rhythm. No murmurs.  GI: Normal bowel sounds, soft, non-distended. Mild  generalized tenderness to palpation.  Musculoskeletal: Trace sockline edema, equal bilateral lower extremities.  Neurologic: Cranial nerves II-XII are grossly intact as tested. No focal deficits.  Neuropsychiatric: Calm, normal eye contact, alert, normal affect, oriented to self, place, time and situation, memory for past and recent events intact and thought process normal.    Data   Results for orders placed or performed in visit on 12/05/18 (from the past 24 hour(s))   X-ray Chest 2 vws*    Narrative    EXAM:  XR CHEST 2 VW    INDICATION: left lower lobe crackles, history of lung cancer, rule out  pneumonia; Lung crackles    COMPARISON:  11/1/2018    FINDINGS:  PA and lateral views of the chest. Right chest port with tip at the  low SVC. Stably enlarged cardiomediastinal silhouette with abnormal  contour along the left heart border corresponding to a known mass.  Left hemidiaphragm is stably elevated with small left pleural effusion  and adjacent opacities. Right lung is clear. Upper abdomen is  unremarkable. No new bone abnormalities. Pulmonary vasculature is  within normal limits.      Impression    IMPRESSION:  1. Small left-sided pleural effusion with adjacent opacities, favored  to represent atelectasis, though infection is not excluded. No new  focal consolidative opacity.  2. Left upper lobe mass better characterized on comparison PET CT.    I have personally reviewed the examination and initial interpretation  and I agree with the findings.    JOLENE REARDON, DO

## 2018-12-06 NOTE — PLAN OF CARE
Problem: Patient Care Overview  Goal: Plan of Care/Patient Progress Review  Outcome: Improving  Neuro: A&Ox4.   Cardiac: Currently SR. Has been in and out of Aflutter with rates into 160s overnight. Metoprolol 12.5 mg PO ordered BID. VSS.    Respiratory: Sating 97% on 2L NC. Denies SOB.   GI/: Minimal urine output overnight still awaiting UA. No BM overnight. Reports moderate abdominal pain, cramping, and no BM for about a week. Miralax and suppository offered and patient refused.   Diet/appetite: Tolerating NPO diet for possible scope in AM.  Activity:  Assist of 1, up to bathroom.   Pain: At acceptable level on current regimen. Oxy scheduled and PRN. Tylenol scheduled and PRN.   Skin: No new deficits noted.  LDA's: R chest port.     Plan: Continue with POC. Notify primary team with changes.

## 2018-12-06 NOTE — PROGRESS NOTES
Patient admitted at 1530 hours. Blood pressures were elevated and patient triggered the sepsis protocol right away. Lactic acid was 0.9. Patient had new onset of atrial fibrillation. EKG obtained with atrial fibrillation noted with AV block. MD notified. Patient went for abdominal x-ray due to abdominal pain. No bowel movement x 7 days but passing gas. Active bowel sounds. CT PE ordered negative for PE. Admission assessment started. Safety questions still need to be asked, but family present during assessment. Up with stand by assist. Recommend bed alarm overnight. Will need urine samples for UA/UC, random sodium and 24 hour osmolality collection. Port infusing with liter bolus followed by D5W with 1/2 NS at 100 mL/hr.     Patient transferred to  for telemetry monitoring at 1900 hours. Metoprolol 5 mg IV given prior to transfer.

## 2018-12-06 NOTE — PROVIDER NOTIFICATION
Heme/Onc aldo DODGE notified that patient reverted back into A-flutter with rates 150. PO Metoprolol 12.5 mg ordered. Continuing to monitor patient and will notify team of any new changes.

## 2018-12-06 NOTE — PROGRESS NOTES
CLINICAL NUTRITION SERVICES - ASSESSMENT NOTE     Nutrition Prescription    RECOMMENDATIONS FOR MDs/PROVIDERS TO ORDER:  If pt is unable to meet at least 75% of estimated needs over the next 2-3 days recommend nutrition support.    Malnutrition Status:    Severe malnutrition in the context of chronic illness    Recommendations already ordered by Registered Dietitian (RD):  Calorie counts ( no supplements)    Future/Additional Recommendations:  Highly recommend nutrition support if PO intakes do not improve in the next 2-3 days.   En: Isosource 1.5 @ goal 60 ml/hr (1440 ml/day) to provide 2160 kcals (37 kcal/kg/day), 98 g PRO (1.7 g/kg/day), 1094 ml free H2O, 253 g CHO and 22 g Fiber daily.     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    started with lack of appetite approximately 2-3 months ago, then developed reflux type symptoms which improved with LORIN medications. N/V started with chemo. Food does not taste good. When she does eat, she often times gets sick after a few bites.     More constipated, reports last BM was 6-7 days ago. She states she is passing flatus, but daughter reports she has to massage abdomen in order for her to pass gas.     Per patient and family, reports that over the past 6 months patient has had sips and bits of food. Her meals have usually been soups but that even when she did eat, often times it would result is emesis. She reports that she was having a metallic taste in her mouth but after a medication was switched that resolved however food just [place] not taste good to her at  "all. She has been craving some food items while in the hospital and RD encouraged this. She ate pudding today and reported that it actually tasted good and her son reported that was the first time she has eaten that much in a long time. Patient highly dislikes supplements.     CURRENT NUTRITION ORDERS  Diet: Regular  Intake/Tolerance: Poor PO intakes per RN flowsheets    LABS  Na: 132 (L), Labs reviewed    MEDICATIONS   Has tried Zofran (got headaches) and Compazine (reports fatigue, dizziness, and worsened nausea). Using Ativan for sleep. Medications reviewed    ANTHROPOMETRICS  Height: 5' 2\"  Most Recent Weight: 58.3 kg (128 lb 8 oz)    IBW: 50 kg  BMI: Normal BMI  Weight History: Pt and family report that patient has lost about 60# in the past 6 months. This would be ~ 32% wt loss, which is significant.   Wt Readings from Last 30 Encounters:   12/06/18 58.3 kg (128 lb 8 oz)   12/05/18 55.4 kg (122 lb 1.6 oz)   11/28/18 56.2 kg (123 lb 14.4 oz)   11/21/18 55.4 kg (122 lb 1.6 oz)   11/20/18 54.9 kg (121 lb)   11/20/18 55.1 kg (121 lb 6.4 oz)   11/13/18 56.5 kg (124 lb 9 oz)   11/07/18 57 kg (125 lb 11.2 oz)     Dosing Weight: 58 kg (actual)    ASSESSED NUTRITION NEEDS  Estimated Energy Needs: 3538-2237 kcals/day (35 - 40+ kcals/kg)  Justification: Increased needs and Repletion  Estimated Protein Needs:  grams protein/day (1.5 - 2+ grams of pro/kg)  Justification: Hypercatabolism with acute illness, Increased needs and Repletion  Estimated Fluid Needs: 1 mL/kcal/day  Justification: Maintenance    PHYSICAL FINDINGS  See malnutrition section below.    MALNUTRITION  % Intake: </=50% for >/= 1 month (severe)  % Weight Loss: > 30% in 6 months (severe)  Subcutaneous Fat Loss: Facial region, Upper arm, Lower arm and Thoracic/intercostal: moderate  Muscle Loss: moderate-severe  Fluid Accumulation/Edema: None noted  Malnutrition Diagnosis: Severe malnutrition in the context of chronic illness    NUTRITION " DIAGNOSIS  Inadequate protein-energy intake related to decreased appetite, poor PO intakes, n/v, taste changes as evidenced by 32% wt losd in the past 6 months and pt report with moderate- severe muscle and fat wasting       INTERVENTIONS  Implementation  Nutrition Education: Provided education on role of RD an nutrition POC   Calorie counts    Goals  Total avg nutritional intake to meet a minimum of 35 kcal/kg and 1.5 g PRO/kg daily (per dosing wt 58 kg).     Monitoring/Evaluation  Progress toward goals will be monitored and evaluated per protocol.    Benita Jenkins RD, MS, LD  6B- Pager: [phone]

## 2018-12-06 NOTE — PROGRESS NOTES
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 She is admitted to the hospital for evaluation of weakness, nausea/vomiting, poor PO intake, and pain.     Generalized weakness.  Nausea, vomiting, and anorexia with associated weight loss (malnutrition present, severity unspecified).  Generally has declined since receiving first cycle of chemo on 11/21/18. Sleeping most of the day, tolerating just a few sips of liquids, and vomits often shortly after eating. Saw PCP on 12/4/18 who prescribed Remeron 7.5 mg at bedtime. AXR on admission showed moderate stool burden, and she admits to no major BMs for many days now. Still passing gas.   - Nutrition services consulted, as she has not been able to find nutritional supplements that she enjoys (too sweet).  - PT/OT consulted.  - Palliative care consulted for assistance with symptom management, appreciate their assistance. Starting dexamethasone 8 mg PO daily and schedule ondansetron 8 mg Q6H.  - GI consulted for work-up of N/V. Possible extrinsic esophageal obstruction, possible esophageal candidiasis. Planning EGD versus upper GI series pending discussion with patient and her family. Will keep her NPO after midnight in case EGD is planned.  - Schedule Miralax and Senna per palliative care recommendations.      A.fib/flutter with RVR (resolved).  Developed A.fib/flutter w/RVR shortly after admission. No prior history of A.fib. CT PE was negative on 12/15. Was given IV fluids and metoprolol 5 mg IV x 1 on 12/5. Was also started on metoprolol 12.5 mg PO BID. She is in NSR this AM, with HRs in the 80s-90s. BP is adequate.    Small pericardial effusion.  Echocardiogram from 12/6/18  "showed a small circumferential pericardial effusion with prominent fibrinous material and thickened pericardium, concerning for mixed constrictive effusive physiology. She is hemodynamically stable, and asymptomatic.  - Monitor for now.     Cancer-related pain.  Pain mostly located in area of tumor on left side of chest and underneath the left breast. Had been using ice packs on this region at home. She also reports \"all over\" pain in body, which may be secondary to Neulasta. Recently started OxyContin 10 mg BID with Percocet 5-325 mg approx 4 times per day.   - Will continue OxyContin, and use Oxycodone 5 mg Q4H PRN.  - Added topical ketamine-gabapentin-lidocaine cream TID PRN for localized left chest-wall pain.   - Scheduled Tylenol 650 mg TID.  - Appreciate assistance of palliative care team as well.     Leukocytosis + Anemia.  Suspect leukocytosis is due to Neulasta, may be a component of infection as well. Anemia likely secondary to underlying disease and chemotherapy.  - No transfusions needed at this time.  - Defer antibiotics given lack of fever and other infectious symptoms. No evidence of PNA on recent CT.      Hyponatremia.  Present on recent labs, as low as 127. Suspect dehydration +/- paraneoplastic syndrome. Urine studies were unremarkable. Improved after IV fluids.  - Monitor for now.  - May consider switching IV fluids from 1/2 NS to NS if still low tomorrow.    Squamous cell carcinoma of the L lung (T4NXM0 or possibly M1).  Follows with Dr. Solares. Presented with left arm pain. CT scan on 10/24/2018 showed a cystic mass involving the left side of the mediastinum extending from the left hilum towards the cardiac apex, abutting the pericardium over a long extent peripherally. There is an enhancing soft tissue element as well surrounding atelectatic lingula. MRA brain on 10/31/2018 showed no evidence of brain metastases. PET/CT on 11/13/2018 and CT-guided biopsy on 11/01/2018 were consistent with " squamous cell carcinoma.   - Initial treatment plan was for 2-3 cycles of neoadjuvant Carbo/Taxol followed by CT scan and sequential radiation, followed by maintenance immune therapy. She received C1D1 Carbo/Taxol on 11/21/18 with Neulasta support.     Lower extremity edema.  Very mild, but new per patient and her daughter. Suspect secondary to malnutrition (albumin 2.6).   - Encourage elevation of the legs.    CODE: FULL - discussed with patient at the time of admission.  DVT: enoxaparin  Diet/fluids: regular diet as tolerated, D5 1/2 NS at 100 ml/hr  Disposition: Admitted to the oncology service for evaluation of N/V and poor PO tolerance. Requires evaluation by GI and consideration of endoscopy versus upper GI series. Anticipate discharge in the next couple of days, pending GI/PT/OT evaluations.    Patient was seen and plan of care was discussed with attending physician Dr. Bell.    Kavitha Garcia MD/PhD  Heme/Onc Fellow  ___________________________________________________________________    Subjective & Interval History:    Transferred to  overnight for A.fib/flutter with RVR (rates up to 160s). Was given IV metoprolol followed by starting PO metoprolol. Converted to NSR, and rates now 80s-90s. No chest pain or SOB. O2 weaned down to 2 L NC. Has not eaten anything yet. Tolerating small sips. No pain. Energy is poor. Son at bedside this AM.    Physical Exam:    Blood pressure (!) 159/107, pulse 119, temperature 98  F (36.7  C), temperature source Oral, resp. rate 18, weight 58.3 kg (128 lb 8 oz), SpO2 96 %.    General: elderly woman, appears tired but not in acute distress, alert and cooperative  HEENT: sclera anicteric, EOMI, MMM  Neck: supple, normal ROM, JVD not appreciated  CV: RRR, normal S1/S2, no m/r/g  Resp: decreased at the left base, no wheezing/crackles, normal respiratory effort on 2 L NC  GI: soft, non-tender, non-distended, bowel sounds present and normoactive  MSK: warm and well-perfused,  trace edema bilaterally  Skin: no rashes on limited exam, no cyanosis, no jaundice  Neuro: AOx3, moves all extremities equally, no focal deficits    Labs & Studies: I personally reviewed the following studies:  ROUTINE LABS (Last four results):  CMP  Recent Labs  Lab 12/06/18  1224 12/06/18  0513 12/05/18  1750 12/05/18  1113   NA  --  132*  --  130*   POTASSIUM  --  4.2  --  4.4   CHLORIDE  --  97  --  94   CO2  --  23  --  25   ANIONGAP  --  11  --  11   GLC  --  120*  --  81   BUN  --  10  --  10   CR  --  0.63 0.66 0.59   GFRESTIMATED  --  >90 86 >90   GFRESTBLACK  --  >90 >90 >90   PANTERA  --  8.0*  --  8.4*   MAG 2.4* 1.5*  --   --    PHOS  --  2.9  --   --    PROTTOTAL  --  5.9*  --  6.7*   ALBUMIN  --  2.1*  --  2.6*   BILITOTAL  --  0.3  --  0.4   ALKPHOS  --  136  --  145   AST  --  18  --  17   ALT  --  17  --  18     CBC  Recent Labs  Lab 12/06/18  0513 12/05/18  1750 12/05/18  1113   WBC 26.8*  --  28.7*   RBC 3.48*  --  3.73*   HGB 10.1*  --  10.8*   HCT 32.2*  --  33.1*   MCV 93  --  89   MCH 29.0  --  29.0   MCHC 31.4*  --  32.6   RDW 14.5  --  14.1    331 372     INRNo lab results found in last 7 days.    Medications list for reference:  Current Facility-Administered Medications   Medication     acetaminophen (TYLENOL) tablet 650 mg     acetaminophen (TYLENOL) tablet 650 mg     bisacodyl (DULCOLAX) Suppository 10 mg     dexamethasone (DECADRON) tablet 8 mg     dextrose 5% and 0.45% NaCl infusion     enoxaparin (LOVENOX) injection 40 mg     heparin 100 UNIT/ML injection 5 mL     heparin lock flush 10 UNIT/ML injection 5-10 mL     heparin lock flush 10 UNIT/ML injection 5-10 mL     influenza Vac Split High-Dose (FLUZONE) injection 0.5 mL     ketamine 5% gabapentin 8% lidocaine 2.5% topical PLO cream     lidocaine (LMX4) cream     lidocaine 1 % 1 mL     LORazepam (ATIVAN) tablet 0.5 mg     losartan (COZAAR) tablet 50 mg     magnesium sulfate 4 g in 100 mL sterile water (premade)     Medication  Instruction     metoprolol tartrate (LOPRESSOR) half-tab 12.5 mg     naloxone (NARCAN) injection 0.1-0.4 mg     OLANZapine (zyPREXA) tablet 2.5 mg     ondansetron (ZOFRAN-ODT) ODT tab 8 mg     oxyCODONE (oxyCONTIN) 12 hr tablet 10 mg     oxyCODONE (ROXICODONE) tablet 5 mg     polyethylene glycol (MIRALAX/GLYCOLAX) Packet 17 g     potassium chloride (KLOR-CON) Packet 20-40 mEq     potassium chloride 10 mEq in 100 mL intermittent infusion with 10 mg lidocaine     potassium chloride 10 mEq in 100 mL sterile water intermittent infusion (premix)     potassium chloride 20 mEq in 50 mL intermittent infusion     potassium chloride ER (K-DUR/KLOR-CON M) CR tablet 20-40 mEq     potassium phosphate 15 mmol in D5W 250 mL intermittent infusion     potassium phosphate 20 mmol in D5W 250 mL intermittent infusion     potassium phosphate 20 mmol in D5W 500 mL intermittent infusion     potassium phosphate 25 mmol in D5W 500 mL intermittent infusion     prochlorperazine (COMPAZINE) injection 5 mg    Or     prochlorperazine (COMPAZINE) tablet 5 mg    Or     prochlorperazine (COMPAZINE) Suppository 12.5 mg     senna-docusate (SENOKOT-S/PERICOLACE) 8.6-50 MG per tablet 2-4 tablet     sodium chloride (PF) 0.9% PF flush 10-20 mL     sodium chloride (PF) 0.9% PF flush 10-20 mL     Facility-Administered Medications Ordered in Other Encounters   Medication     heparin 100 UNIT/ML injection 5 mL     heparin 100 UNIT/ML injection 5 mL     sodium chloride (PF) 0.9% PF flush 20 mL

## 2018-12-06 NOTE — CONSULTS
[facility] [place] [facility]  Palliative Care Consultation Note    Patient: Barbie Deluca  Date of Admission:  12/5/2018    Requesting provider/team: Daniela Araujo PA-C with Heme/Onc  Reason for consult: Symptom management    Recommendations:  Nausea/Vomiting/lack of appetite:  - Dexamethasone 8 mg daily  - Discontinue prn Ondansetron and schedule 8 mg q6h  - Olanzapine 2.5 mg at bedtime  - Schedule Miralax 17 g BID and Senna 2-4 tabs BID     Pain:  - Continue Oxycontin 10 mg BID  - Encourage use of prn Oxycodone     Goals of care:  - Requested ongoing nia discussions about prognosis and expectations   - Patient is willing to undergo burdensome treatment for more meaningful time, and also wants to be realistic about things.      These recommendations have been discussed with primary team.    Thank you for the opportunity to participate in the care of this patient and family. Our team will follow. Please feel free to contact the on-call Palliative provider with any urgent needs.    DEVIN Kemp CNP  Palliative Care Consult Team  Pager: [phone]    [facility] Inpatient Team Consult pager [phone] (M-F 8-4:30)  After-hours Answering Service [phone]   Palliative Clinic: [phone]     75 minutes spent with patient, with >50% counseling and in care coordination.    Assessment:  Barbie Deluca is a 78 year old female with recently diagnosed SCC of the left lung, who was admitted with weakness, N/V and pain.     I met with Barbie and her son today to introduce the Palliative Care team and services we provide; such as symptom management, assistance navigating chronic illness and goals for treatment, counseling, psychosocial and spiritual support.    Symptoms:   Pain -- cancer related. mainly in left chest, where tumor is located. On Oxycontin BID, with Oxycodone prn for breakthrough, which she only used once overnight.     Nausea/Vomiting/lack of appetite -- started  "with lack of appetite approximately 2-3 months ago, then developed reflux type symptoms which improved with LORIN medications. N/V started with chemo. Food does not taste good. When she does eat, she often times gets sick after a few bites.     Constipation -- passing gas; no BM for 8 days. Imaging ruled out obstruction and showed moderate stool burden.     Social:         Living situation: with daughterIzzy, in [place]        Support system: 5 adult children, many siblings (\"started with 10; have most left\")       Functional status: becoming weaker and requiring more assistance recently     Coping: mainly through family support     Spiritual/Lutheran:    Spiritual background: \"not so much\"    Advance Care Planning:           Decision making capacity: intact       Goals of Care: to get better and live longer, and if that isn't an option, then wants to be realistic and focus on being more comfortable         Health care directive: not on file        Health care agent: next of kin policy        Code Status:  Full Code       POLST not on file     History of Present Illness   Sources of History: patient and electronic health record    Ms. Deluca is a 78 year old female with a past medical history of HTN, who was recently diagnosed with squamous cell carcinoma of the left lung (biopsy confirmed on 11/1/2018). Had been feeling poorly for about six months. First cycle of chemo(Carbo/Taxol) started on 11/21. Since starting chemo has been sleeping most of the day and unable to keep food or fluids down.     ROS:  A comprehensive ROS has been negative other than stated in the HPI and below:     Palliative Symptom Review (0=no symptom/no concern, 1=mild, 2=moderate, 3=severe):  Pain: 1  Fatigue: 3  Nausea: 3  Constipation: 3  Diarrhea: 0  Drowsiness: 2  Poor Appetite: 3  Insomnia: 0  Delirium: 0       Past Medical History:   Past Medical History:   Diagnosis Date     Hypertension           Past Surgical History:   Past " Surgical History:   Procedure Laterality Date     INSERT PORT VASCULAR ACCESS Right 11/20/2018    Procedure: Chest Port Placement;  Surgeon: Anca Mock PA-C;  Location:  OR             Family History:   No family history on file.         Allergies:   Allergies   Allergen Reactions     No Clinical Screening - See Comments Rash     Nicotine Patch            Medications:   I have reviewed this patient's medication profile and medications given in the past 24 hours.    Acetaminophen 650 mg q8h  Ketamine 5%-Gabapentin 8%-Lidocaine 2.5% cream TID  Oxycontin 10 mg BID (started the evening of 12/5)    Acetaminophen 650 mg q4h prn--x1  Lorazepam 0.5 mg q4h prn--0  Ondansetron 4 mg q6h prn--0  Oxycodone 5 mg q4h prn--x1  Miralax 17 g daily prn--0  Senna 1-2 tabs BID prn--0         Physical Exam:   Vital Signs: Temp: 98.9  F (37.2  C) Temp src: Oral BP: 120/74 Pulse: 119 Heart Rate: 84 Resp: 18 SpO2: 97 % O2 Device: Nasal cannula Oxygen Delivery: 2 LPM  Weight: 128 lbs 8 oz    Constitutional: Pleasant female, seen lying in hospital bed. Ill appearing, but in no acute distress.  Head: Normocephalic. Atraumatic. MMM.   Extremities: Warm and well perfused.   Pulm: Non-labored breathing, on NC. Speaking in complete sentences.    Musculoskeletal: YAP. No obvious malformations. Generalized weakness present.   Skin: No jaundice. No concerning rashes or lesions on exposed areas.   Neuro: Drowsy, but oriented x 4. Face symmetrical.  Psych: Speech clear. Affect flat. Memory and insight intact.     Data reviewed:   Qtc 426 on 12/6     CMP  Recent Labs  Lab 12/06/18  0513 12/05/18  1750 12/05/18  1113   *  --  130*   POTASSIUM 4.2  --  4.4   CHLORIDE 97  --  94   CO2 23  --  25   ANIONGAP 11  --  11   *  --  81   BUN 10  --  10   CR 0.63 0.66 0.59   GFRESTIMATED >90 86 >90   GFRESTBLACK >90 >90 >90   PANTERA 8.0*  --  8.4*   MAG 1.5*  --   --    PHOS 2.9  --   --    PROTTOTAL 5.9*  --  6.7*   ALBUMIN 2.1*  --  2.6*    BILITOTAL 0.3  --  0.4   ALKPHOS 136  --  145   AST 18  --  17   ALT 17  --  18     CBC  Recent Labs  Lab 12/06/18  0513 12/05/18  1750 12/05/18  1113   WBC 26.8*  --  28.7*   RBC 3.48*  --  3.73*   HGB 10.1*  --  10.8*   HCT 32.2*  --  33.1*   MCV 93  --  89   MCH 29.0  --  29.0   MCHC 31.4*  --  32.6   RDW 14.5  --  14.1    331 372

## 2018-12-06 NOTE — CONSULTS
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    #. Nausea and vomiting:  Patient notes predominant symptom of vomiting with any oral intake (solids and fluids). No dysphagia, odynophagia but has had unintentional weight loss which is related to her known malignancy. She has several reasons for her nausea and vomiting including cancer, chemotherapy, opoid use and constipation. May also consider possible mass lesion with extrinsic compression or outlet obstruction, however, CT imaging shows intra-thoracic mass with no clear evidence of esophageal or gastric compression. MRI Brain without evidence of metastases. With complete resolution of symptoms and ability to tolerate oral intake with anti-emetics there is no indication for additional evaluation with EGD at this time. Should the patient have recurrent nausea and vomiting despite bowel regimen and anti-emetics we may consider at that time.    #. Constipation:  Patient reports no bowel movement in 7 days. She is on chronic narcotics due to cancer-related pain. She has been on once daily miralax prior to admission.    #. SCC of the lung:  Patient following with heme/onc. She is on carboplatin and taxol for chemotherapy.     Recommendations:  -- IV fluids and electrolyte replacement per primary team  -- Appreciate palliative care involvement for nausea and pain  -- Nutrition consult, consider appetite stimulant  -- Agree with bowel regimen, schedule miralax twice daily with at least once daily senna and docusate as needed  -- No GI follow up necessary  -- Inpatient GI will sign off at this time, please do not hesitate to call with any new questions or concerns or if patient  "has recurrent or worse symptoms    Gastroenterology follow up recommendations: TBD    Thank you for involving us in this patient's care. Please do not hesitate to contact the GI service with any questions or concerns.     Pt care plan discussed with Dr. Rodriguez, GI staff physician.    Nathaly Avila MD  GI Fellow  [phone]  -------------------------------------------------------------------------------------------------------------------          Chief Complaint:   We were asked by Daniela Kauffman PA-C of heme/onc to evaluate this patient with vomiting, weight loss.    History is obtained from the patient and the medical record.          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    The patient reports a 6 month history of diminished appetite related to food not tasting quite right. She states that she \"gags\" up solid food and liquids because of they don't taste good anymore. Describes the vomit as \"phlegmish\", \"whitish\"  but not bloody or bilious. She has no trouble initiating a swallow, neither does she have pain with swallowing or feeling of  food getting stuck while swallowing.  Patient states that she has had trouble keeping foods and liquids down starting August this year and has progressively gotten worse. Has loss of appetite, unintentional 30 lbs weight loss in the past 4 months. Has abdominal soreness, but associates it to tumor location. Has feelings of constipation (hasn't passed BM in a week) and hard time passing gas but denies feeling bloated.             Past Medical History:   Reviewed and edited as appropriate  Past Medical History:   Diagnosis Date     Hypertension           Past Surgical History:   Reviewed and edited as appropriate   Past Surgical History: "   Procedure Laterality Date     INSERT PORT VASCULAR ACCESS Right 11/20/2018    Procedure: Chest Port Placement;  Surgeon: Anca Mock PA-C;  Location: [facility] OR          Previous Endoscopy:   No prior EGD or colonoscopy.         Social History:   Reviewed and edited as appropriate  Social History     Social History     Marital status: Single     Spouse name: N/A     Number of children: N/A     Years of education: N/A     Occupational History     Not on file.     Social History Main Topics     Smoking status: Current Every Day Smoker     Packs/day: 0.50     Types: Cigarettes     Start date: 11/7/1960     Smokeless tobacco: Never Used     Alcohol use No     Drug use: No     Sexual activity: Not on file     Other Topics Concern     Not on file     Social History Narrative            Family History:   Reviewed and edited as appropriate  No known history of gastrointestinal/liver disease or  gastrointestinal malignancies       Allergies:   Reviewed and edited as appropriate     Allergies   Allergen Reactions     No Clinical Screening - See Comments Rash     Nicotine Patch          Medications:     Current Facility-Administered Medications   Medication     acetaminophen (TYLENOL) tablet 650 mg     acetaminophen (TYLENOL) tablet 650 mg     bisacodyl (DULCOLAX) Suppository 10 mg     dexamethasone (DECADRON) tablet 8 mg     dextrose 5% and 0.45% NaCl infusion     enoxaparin (LOVENOX) injection 40 mg     heparin 100 UNIT/ML injection 5 mL     heparin lock flush 10 UNIT/ML injection 5-10 mL     heparin lock flush 10 UNIT/ML injection 5-10 mL     influenza Vac Split High-Dose (FLUZONE) injection 0.5 mL     ketamine 5% gabapentin 8% lidocaine 2.5% topical PLO cream     lidocaine (LMX4) cream     lidocaine 1 % 1 mL     LORazepam (ATIVAN) tablet 0.5 mg     losartan (COZAAR) tablet 50 mg     magnesium sulfate 4 g in 100 mL sterile water (premade)     Medication Instruction     metoprolol tartrate (LOPRESSOR) half-tab 12.5 mg      naloxone (NARCAN) injection 0.1-0.4 mg     OLANZapine (zyPREXA) tablet 2.5 mg     ondansetron (ZOFRAN-ODT) ODT tab 8 mg     oxyCODONE (oxyCONTIN) 12 hr tablet 10 mg     oxyCODONE (ROXICODONE) tablet 5 mg     polyethylene glycol (MIRALAX/GLYCOLAX) Packet 17 g     potassium chloride (KLOR-CON) Packet 20-40 mEq     potassium chloride 10 mEq in 100 mL intermittent infusion with 10 mg lidocaine     potassium chloride 10 mEq in 100 mL sterile water intermittent infusion (premix)     potassium chloride 20 mEq in 50 mL intermittent infusion     potassium chloride ER (K-DUR/KLOR-CON M) CR tablet 20-40 mEq     potassium phosphate 15 mmol in D5W 250 mL intermittent infusion     potassium phosphate 20 mmol in D5W 250 mL intermittent infusion     potassium phosphate 20 mmol in D5W 500 mL intermittent infusion     potassium phosphate 25 mmol in D5W 500 mL intermittent infusion     prochlorperazine (COMPAZINE) injection 5 mg    Or     prochlorperazine (COMPAZINE) tablet 5 mg    Or     prochlorperazine (COMPAZINE) Suppository 12.5 mg     senna-docusate (SENOKOT-S/PERICOLACE) 8.6-50 MG per tablet 2-4 tablet     sodium chloride (PF) 0.9% PF flush 10-20 mL     sodium chloride (PF) 0.9% PF flush 10-20 mL     Facility-Administered Medications Ordered in Other Encounters   Medication     heparin 100 UNIT/ML injection 5 mL     heparin 100 UNIT/ML injection 5 mL     sodium chloride (PF) 0.9% PF flush 20 mL             Review of Systems:   A complete review of systems was performed and is negative except as noted in the HPI           Physical Exam:   BP (!) 129/91 (BP Location: Left arm)  Pulse 119  Temp 98.9  F (37.2  C) (Oral)  Resp 18  Wt 58.3 kg (128 lb 8 oz)  SpO2 97%  BMI 23.5 kg/m2  Wt:   Wt Readings from Last 2 Encounters:   12/06/18 58.3 kg (128 lb 8 oz)   12/05/18 55.4 kg (122 lb 1.6 oz)      Constitutional: Ms. Deluca is lying down in bed, she is cooperative, pleasant, not dyspneic/diaphoretic, no acute distress  Eyes:  Sclera anicteric/injected  Ears/nose/mouth/throat: Normal oropharynx without ulcers or exudate, mucus membranes moist, hearing intact  CV: RRR w/ no M/R/G. No edema  Respiratory: CTAB. Unlabored breathing  Abd:  Nondistended, +bs, no hepatosplenomegaly, nontender, no peritoneal signs  Skin: warm, perfused, no jaundice  Neuro: AAO x 3  Psych: Normal affect  MSK: No gross deformities         Data:   Labs and imaging below were independently reviewed and interpreted    BMP  Recent Labs  Lab 12/06/18  0513 12/05/18  1750 12/05/18  1113   *  --  130*   POTASSIUM 4.2  --  4.4   CHLORIDE 97  --  94   PANTERA 8.0*  --  8.4*   CO2 23  --  25   BUN 10  --  10   CR 0.63 0.66 0.59   *  --  81     CBC  Recent Labs  Lab 12/06/18  0513 12/05/18  1750 12/05/18  1113   WBC 26.8*  --  28.7*   RBC 3.48*  --  3.73*   HGB 10.1*  --  10.8*   HCT 32.2*  --  33.1*   MCV 93  --  89   MCH 29.0  --  29.0   MCHC 31.4*  --  32.6   RDW 14.5  --  14.1    331 372     INRNo lab results found in last 7 days.  LFTs  Recent Labs  Lab 12/06/18  0513 12/05/18  1113   ALKPHOS 136 145   AST 18 17   ALT 17 18   BILITOTAL 0.3 0.4   PROTTOTAL 5.9* 6.7*   ALBUMIN 2.1* 2.6*      PANCNo lab results found in last 7 days.    Imaging:  CTA Chest (12/5/18):  IMPRESSION:   2.  No pulmonary embolism as questioned.  3.  Enlarging left upper mass which measures up to 7.7 cm with  associated left upper and lower bronchial narrowing, invading the  pericardium, compatible with patient's known squamous cell carcinoma.  4.  Increased now large pericardial effusion.  5.  New left small pleural effusion with associated atelectasis.  6.  Findings concerning for bilateral implant rupture. Additional  evaluation with breast MRI could be considered if it becomes  clinically relevant.     XR Abd (12/5/18):  Impression:   1. Moderate colonic stool burden with nonobstructive bowel gas  pattern.  2. Elevation of left hemidiaphragm with small left pleural  effusion.    PET (11/13/18):  IMPRESSION:   1a. 7.3 x 5.3 x 7.6 cm centrally necrotic, peripherally  solid/hypermetabolic left upper lobe index mass, involving the  pericardium and extending into the mediastinum. This is compatible  with known diagnosis of pulmonary squamous cell cancer. It is slightly  larger when compared with 10/24/18, previously measuring 6.4 x 5.3 cm.     Ib. Hypermetabolic soft tissue lesion in the right side of the  superior mediastinum, between superior vena cava and ascending aorta,  circumferentially surrounding the ascending aorta suspicious for tumor  deposit. Smaller focus along the right lateral wall of the superior  vena cava. These are more conspicuous than prior CT.  2. No distant metastatic disease elsewhere in the body or  contralateral lung.  3. 3.3 cm Infrarenal abdominal aortic aneurysm.    TTE (12/6/2018):  Interpretation Summary  There is a small circumferential pericardial effusion with prominent fibrinous  material and thickened pericardium. Minimal right atrial collapse but no RV  collapse.  IVC does not appear to collapse with inspiration suggesting increased RA  pressure.  Tissue Doppler with low E' velocities but lateral is lower than medial  suggesting annulus reversus or constriction.  Hepatic and tricuspid inflow with increased respiratory variation but not  mitral inflow. Septal bounce not very prominent.  Concerns for mixed constrictive effusive physiology.

## 2018-12-06 NOTE — PLAN OF CARE
Patient arrived 6B from 7D via CT at 1855. Patient transferred to 6B for new afib with AV block. Float float stayed at bedside, administered one-time dose IV metoprolol 5mg, and monitored until 1930. Writer and oncoming nurse met patient, bedside report done. 2-nurse skin assessment not yet performed.

## 2018-12-06 NOTE — PLAN OF CARE
Problem: Patient Care Overview  Goal: Plan of Care/Patient Progress Review  OT 6B: Cancel - OT consult received. Pt not medically appropriate this AM, pt converting in and out of A-flutter with rates 160s. Will reschedule.

## 2018-12-07 NOTE — PLAN OF CARE
Problem: Pain, Acute (Adult)  Goal: Identify Related Risk Factors and Signs and Symptoms  Related risk factors and signs and symptoms are identified upon initiation of Human Response Clinical Practice Guideline (CPG).   Outcome: No Change  VSS. R port patent, needle and dsg changed. Fluid restriction added; IVF dc'd. On calorie counts. Kphos replaced.Constipated-took miralax and senna but refused further intervention at this time. L sided pain well controlled with scheduled oxycontin and tylenol

## 2018-12-07 NOTE — PLAN OF CARE
Problem: Patient Care Overview  Goal: Plan of Care/Patient Progress Review  7D PT - HOLD. PT evaluation acknowledged and appreciated. Per chart review, discussion with OT, and PT's clinical judgement, pt demonstrates the need for 1 therapy discipline as pt's greatest needs are for ADLs. Per OT: Pt moves well in room with SBA without assistive device though declines functional ambulation or other OOB activity 2/2 fatigue reported. Will schedule evaluation forward for tomorrow.

## 2018-12-07 NOTE — PLAN OF CARE
Problem: Patient Care Overview  Goal: Plan of Care/Patient Progress Review  Outcome: Improving  Neuro: A&Ox4.   Cardiac: SR. VSS.   Respiratory: Sating 98 on 2L NC  GI/: Adequate urine output. No BM  Diet/appetite: Tolerating regular diet, poor appetite   Activity:  Assist of 1, up to chair and bathroom  Pain: At acceptable level on current regimen. Oxy prn x1  Skin: No new deficits noted.  LDA's: Port infusing.       Plan: Family at bedside. Continue with POC. Notify primary team with changes.

## 2018-12-07 NOTE — PROGRESS NOTES
[facility], [place]    Hematology / Oncology Progress Note    Date of Service (when I saw the patient): 12/07/2018     Assessment & Plan   Barbie Deluca is a 78 year old female with recently diagnosed squamous cell carcinoma of the left lung (s/p carbo/taxol x 1 cycle). She is admitted to the hospital for evaluation of weakness, nausea/vomiting, poor PO intake, and pain.      Generalized weakness.  Nausea, vomiting, and anorexia with associated weight loss (malnutrition present, severity unspecified).  Generally has declined since receiving first cycle of chemo on 11/21/18. Sleeping most of the day, tolerating just a few sips of liquids, and vomits often shortly after eating. Saw PCP on 12/4/18 who prescribed Remeron 7.5 mg at bedtime. AXR on admission showed moderate stool burden, and she admits to no major BMs for many days now. Still passing gas.   - Nutrition services consulted  - PT/OT consulted.  - Palliative care consulted for assistance with symptom management, appreciate their assistance.   - PO dexamethasone 8 mg daily and schedule ondansetron 8 mg Q8H.  - GI consulted for work-up of N/V. Possible extrinsic esophageal obstruction, possible esophageal candidiasis. With improvement in nausea with medical management, will defer EGD at this time.   - Discussed advancement in bowel regimen, including trial of Relistor, but patient not wanting to try anything in addition to Miralax/Senna at this time.      A.fib/flutter with RVR (resolved).  Developed A.fib/flutter w/RVR shortly after admission. No prior history of A.fib. CT PE was negative on 12/15. Was given IV fluids and metoprolol 5 mg IV x 1 on 12/5. Was also started on metoprolol 12.5 mg PO BID. She is in NSR this AM, with HRs in the 80s-90s. BP is adequate.     Small pericardial effusion.  Echocardiogram from 12/6/18 showed a small circumferential pericardial effusion with prominent fibrinous material and thickened  "pericardium, concerning for mixed constrictive effusive physiology. She is hemodynamically stable, and asymptomatic.  - Cardiology consulted, recs appreciated      Cancer-related pain.  Pain mostly located in area of tumor on left side of chest and underneath the left breast. Had been using ice packs on this region at home. She also reports \"all over\" pain in body, which may be secondary to Neulasta. Recently started OxyContin 10 mg BID with Percocet 5-325 mg approx 4 times per day.   - Will continue OxyContin, and use Oxycodone 5 mg Q4H PRN.  - Added topical ketamine-gabapentin-lidocaine cream TID PRN for localized left chest-wall pain.   - Scheduled Tylenol 650 mg TID.  - Appreciate assistance of palliative care team as well.      Leukocytosis + Anemia.  Suspect leukocytosis is due to Neulasta, may be a component of infection as well. Anemia likely secondary to underlying disease and chemotherapy. Likely exacerbated by addition of Dex.   - No transfusions needed at this time.  - Defer antibiotics given lack of fever and other infectious symptoms. No evidence of PNA on recent CT.      Hyponatremia.  Present on recent labs, as low as 127. Suspect dehydration +/- paraneoplastic syndrome. Urine studies were unremarkable. No improvement with IVF. 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   - Check Na q12  - Fluid restrict to 2L     Squamous cell carcinoma of the L lung (T4NXM0 or possibly M1).  Follows with Dr. Solares. Presented with left arm pain. CT scan on 10/24/2018 showed a cystic mass involving the left side of the mediastinum extending from the left hilum towards the cardiac apex, abutting the pericardium over a long extent peripherally. There is an enhancing soft tissue element as well surrounding atelectatic lingula. MRA brain on 10/31/2018 showed no evidence of brain metastases. PET/CT on 11/13/2018 and CT-guided biopsy on 11/01/2018 were consistent with squamous cell carcinoma.   - Initial treatment plan was for 2-3 cycles of " neoadjuvant Carbo/Taxol followed by CT scan and sequential radiation, followed by maintenance immune therapy. She received C1D1 Carbo/Taxol on 11/21/18 with Neulasta support.      Lower extremity edema.  Very mild, but new per patient and her daughter. Suspect secondary to malnutrition (albumin 2.6).   - Encourage elevation of the legs.     CODE: FULL - discussed with patient at the time of admission.  DVT: enoxaparin  Diet/fluids: regular diet as tolerated,   Disposition: Per OT eval, can return home with assistance (RN arranged home PT/OT/RN). Anticipate if symptoms have improved and tolerating PO intake, she could discharge to home on 12/8.  Follow-up: RAGINI visit on 12/12 (also due to cycle 2 Carbo/Taxol, but will likely be delayed)      Patient was seen and plan of care was discussed with attending physician Dr. Bell.     Lexie Lopes PA-C  Hematology/Oncology  Pager: [phone]    Interval History   Son at bedside and supportive. Slowing starting to advance diet, but still has minimal appetite. No BM, but passing gas. Nausea and pain have both improved. Appears defeated from chemo and hospital stay.     Physical Exam   Temp: 96.6  F (35.9  C) Temp src: Oral BP: 109/59 Pulse: 73 Heart Rate: 78 Resp: 18 SpO2: 92 % O2 Device: None (Room air) Oxygen Delivery: 2 LPM  Vitals:    12/05/18 1540 12/06/18 0550 12/07/18 0900   Weight: 55.4 kg (122 lb 2.2 oz) 58.3 kg (128 lb 8 oz) 60.3 kg (133 lb)     Vital Signs with Ranges  Temp:  [96.5  F (35.8  C)-97.9  F (36.6  C)] 96.6  F (35.9  C)  Pulse:  [73-89] 73  Heart Rate:  [74-91] 78  Resp:  [18] 18  BP: (109-163)/(55-81) 109/59  SpO2:  [85 %-96 %] 92 %  I/O last 3 completed shifts:  In: 2080 [P.O.:610; I.V.:1470]  Out: -     Constitutional: Pleasant female seen laying in bed. No apparent distress, appears chronically ill.  Eyes: Lids and lashes normal, sclera clear, conjunctiva normal.  ENT: Normocephalic, oral pharynx with moist mucus membranes, tonsils without  erythema or exudates, gums normal and good dentition.   Respiratory: No increased work of breathing, good air exchange, clear to auscultation bilaterally, no crackles or wheezing.  Cardiovascular: Regular rate and rhythm, normal S1 and S2, and no murmur noted.  GI: No masses or scars. +BS, hypoactive. Soft. No tenderness on palpation.  Skin: No bruising or bleeding, no redness, warmth, or swelling, no rashes, no lesions, no jaundice.  Extremities: There is no redness, warmth, or swelling of the joints. No lower extremity edema. No cyanosis.  Neurologic: Awake, alert, oriented to name, place and time.    Vascular access: Port    Medications     - MEDICATION INSTRUCTIONS -         acetaminophen  650 mg Oral Q8H     dexamethasone  8 mg Oral Daily     enoxaparin  40 mg Subcutaneous Q24H     heparin  5 mL Intracatheter Q28 Days     heparin lock flush  5-10 mL Intracatheter Q24H     influenza Vac Split High-Dose  0.5 mL Intramuscular Prior to discharge     losartan  50 mg Oral Daily     metoprolol tartrate  12.5 mg Oral BID     OLANZapine  2.5 mg Oral At Bedtime     ondansetron  8 mg Oral TID     oxyCODONE  10 mg Oral Q12H     polyethylene glycol  17 g Oral BID     senna-docusate  2-4 tablet Oral BID       Data   ROUTINE IP LABS (Last four results)  BMP  Recent Labs  Lab 12/07/18  0557 12/06/18  0513 12/05/18  1750 12/05/18  1113   * 132*  --  130*   POTASSIUM 4.4 4.2  --  4.4   CHLORIDE 97 97  --  94   PANTERA 7.5* 8.0*  --  8.4*   CO2 25 23  --  25   BUN 10 10  --  10   CR 0.56 0.63 0.66 0.59   * 120*  --  81     CBC  Recent Labs  Lab 12/07/18  0557 12/06/18  0513 12/05/18  1750 12/05/18  1113   WBC 29.9* 26.8*  --  28.7*   RBC 3.41* 3.48*  --  3.73*   HGB 9.7* 10.1*  --  10.8*   HCT 31.4* 32.2*  --  33.1*   MCV 92 93  --  89   MCH 28.4 29.0  --  29.0   MCHC 30.9* 31.4*  --  32.6   RDW 14.5 14.5  --  14.1    364 331 372     INRNo lab results found in last 7 days.

## 2018-12-07 NOTE — CONSULTS
Cardiology Inpatient Consultation  December 7, 2018    Reason for Consult:  A cardiology consult was requested by Lexie Lopes to provide clinical guidance regarding small pericardial effusion.    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 At this time, the mass was noted to possibly involved the pericardium and an MR myocardium was obtained that showed a solid mass that involved the pericardium. Notably she had a PET scan 11/13/18 that showed a 7.3 x 5.2 x 7.6 cm mass in the left lung, left lingular segment with extension into her mediastinum invading the adjacent pericardium with a small pericardial effusion seen as well; a circumferential mass-like focus in the superior mediastinum encasing the lateral margin of the ascending aorta between the SVC and the ascending aorta (possible metastatic deposit). Mrs. Deluca is admitted for FTT (weakness, nausea, vomiting, poor PO intake, pain) after one cycle of chemo. She was profoundly dehydrated on admission and cares have been focused on resuscitating her; when dehydrated she did have an episode of Afib/flutter with RVR, spontaneously converting with fluids and IV metoprolol, started on Metoprolol 12.5 mg BID with which she was asympomatic and otherwise stable.     Her workup this admission included a CT chest PE with possible enlargment of her left upper lobe mass 7.7 cm with left upper/lower bronchial narrowing and again demonstrating invasion into the pericardium with a large pericardial effusion on CT (usually over-estimated by CT imaging).  Cardiology is consulted after a TTE was obtained that revealed a small circumferential pericardial effusion with prominent fibrinous material and thickened pericardium as well as possible mixed constrictive  effusive physiology (tissue doppler with low E' velocities but lateral lower than medial).    At the time of interview, Mrs. Deluca denies chest pain. She endorses some left mid axillary pleuritic pain to her chest wall that is better with ice-packs. She denies significant edema, orthopnea, PND. She denies palpitations and states she was unaware of when she was in atrial fibrillation.     Assessment:   78 year old female with SCC of her left lung with invasion into the mediastinum involving the pericardium. Her TTE was reviewed as well as her CT, PET, and MR myocardium. Her effusion is small and because of her cancer; this is not amenable to pericardiocentesis due to its very small size. The constriction mentioned in her TTE is very subtle and on over-read not hemodynamically significant if even present. All of her cardiac findings are secondary to her SCC. She is well compensated from a CV point of view with strong pulses and no evidence of fluid overload.    Plan:   -No further workup is required from a CV standpoint   -Small, stable pericardial effusion likely secondary to her cancer that has been present since at least October   -Could consider a cardiac event monitor if felt necessary given her run of atrial fibrillation though the etiology was her presenting dehydration and malnutrition. Her [id number] score is >2 and this would warrant anticoagulation however would weight the risks of this with her overall prognosis and we will defer this decision to her oncology team      Review of Systems:    Complete review of systems was performed and negative except per HPI.    PMH:  Past Medical History:   Diagnosis Date     Hypertension      Active Problems:  Patient Active Problem List    Diagnosis Date Noted     Acute dehydration 12/05/2018     Priority: Medium     Non-small cell cancer of left lung (H) 11/20/2018     Priority: Medium     Encounter for antineoplastic chemotherapy 11/20/2018     Priority: Medium      Encounter for other specified aftercare 11/20/2018     Priority: Medium     Social History:  Social History   Substance Use Topics     Smoking status: Current Every Day Smoker     Packs/day: 0.50     Types: Cigarettes     Start date: 11/7/1960     Smokeless tobacco: Never Used     Alcohol use No     Family History:  No family history on file.    Medications:    acetaminophen  650 mg Oral Q8H     dexamethasone  8 mg Oral Daily     enoxaparin  40 mg Subcutaneous Q24H     heparin  5 mL Intracatheter Q28 Days     heparin lock flush  5-10 mL Intracatheter Q24H     influenza Vac Split High-Dose  0.5 mL Intramuscular Prior to discharge     losartan  50 mg Oral Daily     metoprolol tartrate  12.5 mg Oral BID     OLANZapine  2.5 mg Oral At Bedtime     ondansetron  8 mg Oral TID     oxyCODONE  10 mg Oral Q12H     polyethylene glycol  17 g Oral BID     senna-docusate  2-4 tablet Oral BID         dextrose 5% and 0.45% NaCl 100 mL/hr at 12/07/18 0455     - MEDICATION INSTRUCTIONS -         Physical Exam:  Temp:  [96.5  F (35.8  C)-98.6  F (37  C)] 96.5  F (35.8  C)  Pulse:  [73-89] 73  Heart Rate:  [72-85] 74  Resp:  [18] 18  BP: (111-169)/() 126/79  SpO2:  [85 %-96 %] 90 %    Intake/Output Summary (Last 24 hours) at 12/07/18 1006  Last data filed at 12/07/18 0659   Gross per 24 hour   Intake             2480 ml   Output              300 ml   Net             2180 ml     GEN: pleasant, no acute distress  HEENT: no icterus  CV: RRR, normal s1/s2, no murmurs/rubs/s3/s4, no heave. JVP flat  CHEST: diminished  ABD: soft, non-tender, normal active bowel sounds  EXTR: pulses intact. No clubbing, cyanosis or edema.   NEURO: alert oriented, speech fluent/appropriate, motor grossly nonfocal    Diagnostics:  All labs and imaging were reviewed, of note:    All laboratory data reviewed       Lab Results   Component Value Date     12/07/2018    Lab Results   Component Value Date    CHLORIDE 97 12/07/2018    Lab Results   Component  Value Date    BUN 10 12/07/2018      Lab Results   Component Value Date    POTASSIUM 4.4 12/07/2018    Lab Results   Component Value Date    CO2 25 12/07/2018    Lab Results   Component Value Date    CR 0.56 12/07/2018        Lab Results   Component Value Date    WBC 29.9 (H) 12/07/2018    HGB 9.7 (L) 12/07/2018    HCT 31.4 (L) 12/07/2018    MCV 92 12/07/2018     12/07/2018       No results found for: TROPI, TROPONIN, TROPR, TROPN      I have discussed the above with Dr. Russell.    Thank you for consulting the cardiovascular services at the [facility]. Please do not hesitate to call us with any questions.     Ronnie Goldman PA-C  [facility] Cardiology Consult Team  Pager [phone] or [phone]

## 2018-12-07 NOTE — PROGRESS NOTES
Care Coordinator - Discharge Planning    Admission Date/Time:  12/5/2018  Attending MD:  Kelsey Bell MD     Data  Date of initial CC assessment:  12/7/18  Chart reviewed, discussed with interdisciplinary team.   Patient was admitted for:   1. Acute dehydration    2. Non-small cell cancer of left lung (H)         Assessment   Concerns with insurance coverage for discharge needs: None.  Current Living Situation: Patient lives with adult child.(Lives with daughter who works 14 hr days. Has other children who help as they can.  Support System: Supportive and Involved. Patient and her son, Krishna reports that all children are supportive and well meaning but sometimes differing opinions get in the way.  Services Involved: none prior to hospitalization.  Transportation at Discharge: Car and Family or friend will provide  Transportation to Medical Appointments:    - Name of caregiver: Krishna or Izzy  Barriers to Discharge: medical stability    Met with patient and son, Krishna, at bedside. They are happy with her progress during hospitalization. Olivia brought her breakfast from [email or web address] this AM. It is appealing to patient though dona has eaten only a small amount of it. She continues to work on increasing oral intake.  They aware that she will likely be discharged this weekend. Discussed home situation and needs. She lives with daughter, Izzy and this is going well. Is alone much of time as Izzy works long hours. Other children check in as able, though many of the 5 are not local. Krishna is from up [place] but has some time off so is staying with his mother. His main concern is that though all 5 children are supportive and well meaning there are often differing opinions on what should be done (patient agrees).   Patient and son believe home care would be beneficial as they have had many questions come up regarding meds and other cares.Discussed options for home care and they would like [facility] for RN/PT/OT.  Orders  have been placed and referral made.    Coordination of Care and Referrals: Provided patient/family with options for Home Care.      Plan  Anticipated Discharge Date:  12/9/18  Anticipated Discharge Plan:  Home with home care and family support  CTS Handoff completed:  NO-will be sent at time of discharge    Milad Allison RN

## 2018-12-07 NOTE — PLAN OF CARE
Problem: Patient Care Overview  Goal: Plan of Care/Patient Progress Review  PT 6B: Cancel - PT consult received. Pt not medically appropriate this AM, pt converting in and out of A-flutter with rates 160s. Will reschedule PT evaluation.

## 2018-12-07 NOTE — PROGRESS NOTES
Patient transferred to  from  at 1800 hours. Patient was afebrile upon arrival. Blood pressure elevated and met parameters for PRN hydralazine. Education on new medication prior to administration. On 2 LPM via nasal cannula with adequate oxygenation. Wean when able. Patient reports that she has not had a bowel movement yet (now 8 days since last bowel movement) and not passing gas at the moment. Bowel sounds are hypoactive. Continue to monitor. Patient has orders for miralax and senna this evening. PRN suppository if patient is agreeable. Port infusing D5W with 1/2 NS at 100 mL/hr. Up with stand by assist. Patient encouraged to order dinner this evening. Family at bedside and very attentive to patient's needs.

## 2018-12-07 NOTE — PROGRESS NOTES
12/07/18 1100   Living Environment   Lives With child(sandeep), adult   Living Arrangements house   Home Accessibility tub/shower is not walk in;bed and bath on same level;no concerns   Number of Stairs to Enter Home 0   Number of Stairs Within Home 0   Transportation Available car;family or friend will provide   Living Environment Comment Pt lives with adult daughter. Reports she will have assist as needed.    Self-Care   Dominant Hand left   Usual Activity Tolerance moderate   Current Activity Tolerance fair   Regular Exercise no   Equipment Currently Used at Home none   Activity/Exercise/Self-Care Comment Pt reports that 1 month or so activity has been limited. She does not report regular exercise.    Functional Level Prior   Ambulation 0-->independent   Transferring 0-->independent   Toileting 0-->independent   Bathing 0-->independent   Dressing 0-->independent   Eating 0-->independent   Communication 0-->understands/communicates without difficulty   Swallowing 0-->swallows foods/liquids without difficulty   Cognition 0 - no cognition issues reported   Fall history within last six months no   Which of the above functional risks had a recent onset or change? ambulation;bathing;toileting;dressing   General Information   Onset of Illness/Injury or Date of Surgery - Date 12/05/18   Referring Physician Daniela Araujo, MARY KATE   Patient/Family Goals Statement return home to [facility]    Additional Occupational Profile Info/Pertinent History of Current Problem 78 year old female with a history of SCC of the lung ([id number])) with cystic mediastinal mass on carboplatin & taxol (recently started) who was admitted to the hospital with weakness, unintentional weight loss and vomiting.   Precautions/Limitations no known precautions/limitations   Weight-Bearing Status - LUE full weight-bearing   Weight-Bearing Status - RUE full weight-bearing   Weight-Bearing Status - LLE full weight-bearing   Weight-Bearing Status - RLE full  weight-bearing   General Info Comments Activity: up with assist    Cognitive Status Examination   Orientation orientation to person, place and time   Level of Consciousness alert   Able to Follow Commands WNL/WFL   Personal Safety (Cognitive) WNL/WFL   Memory intact   Organization/Problem Solving No deficits were identified   Executive Function No deficits were identified   Visual Perception   Visual Perception Wears glasses;No deficits were identified   Sensory Examination   Sensory Quick Adds No deficits were identified   Pain Assessment   Patient Currently in Pain No   Integumentary/Edema   Integumentary/Edema no deficits were identifed   Posture   Posture forward head position   Range of Motion (ROM)   ROM Quick Adds No deficits were identified   Strength   Manual Muscle Testing Quick Adds No deficits were identified   Hand Strength   Hand Strength Comments BUE  strength WNL   Muscle Tone Assessment   Muscle Tone Quick Adds No deficits were identified   Coordination   Upper Extremity Coordination No deficits were identified   Gross Motor Coordination WFL   Fine Motor Coordination WFL   Mobility   Bed Mobility Bed mobility skill: Sit to supine   Bed Mobility Skill: Sit to Supine   Level of [place]: Sit/Supine stand-by assist   Physical Assist/Nonphysical Assist: Sit/Supine supervision   Transfer Skills   Transfer Transfer Skill: Stand to Sit;Transfer Safety Analysis Sit/Stand   Transfer Skill: Sit to Stand   Level of [place]: Sit/Stand stand-by assist   Physical Assist/Nonphysical Assist: Sit/Stand supervision   Transfer Skill: Sit to Stand full weight-bearing   Transfer Skill: Toilet Transfer   Level of [place]: Toilet stand-by assist   Physical Assist/Nonphysical Assist: Toilet supervision   Weight-Bearing Restrictions: Toilet full weight-bearing   Tub/Shower Transfer   Tub/Shower Transfer Transfer Skill: Tub/Shower Transfers   Transfer Skill: Tub/Shower Transfer   Level of [place]:  Tub/Shower stand-by assist   Physical Assist/Nonphysical Assist: Tub/Shower set-up required;verbal cues;1 person assist   Weight-Bearing Restrictions: Tub/Shower full weight-bearing   Balance   Balance Quick Add No deficits identified   Bathing   Level of [place] stand-by assist   Physical Assist/Nonphysical Assist 1 person assist;supervision;set-up required   Lower Body Dressing   Level of [place]: Dress Lower Body minimum assist (75% patients effort)   Physical Assist/Nonphysical Assist: Dress Lower Body set-up required;1 person assist;verbal cues   Instrumental Activities of Daily Living (IADL)   Previous Responsibilities medication management;shopping   IADL Comments Pt reports since sudden onset of weakness that her daughter has taken over laundry and meal preparation, does not drive. Reports independence with medication management and shopping otherwise dependent on others for all other IADLs.    Activities of Daily Living Analysis   Impairments Contributing to Impaired Activities of Daily Living flexibility decreased;pain;strength decreased;ROM decreased   General Therapy Interventions   Planned Therapy Interventions ADL retraining;IADL retraining;progressive activity/exercise;strengthening   Clinical Impression   Criteria for Skilled Therapeutic Interventions Met yes, treatment indicated   OT Diagnosis decreased ADL-I   Influenced by the following impairments decreased flexibility, pain, decreased strength, decreased ROM   Assessment of Occupational Performance 3-5 Performance Deficits   Identified Performance Deficits dressing, functional ambulation, toileting   Clinical Decision Making (Complexity) Low complexity   Therapy Frequency daily   Predicted Duration of Therapy Intervention (days/wks) 12/14/18   Anticipated Discharge Disposition Home with Home Therapy   Risks and Benefits of Treatment have been explained. Yes   Patient, Family & other staff in agreement with plan of care Yes   Clinical  "Impression Comments Pt presents today with decreased flexibility, pain, decreased strength, and decreased ROM all leading to decreased ADL-I. Pt to benefit from continued skilled instruction to address the following problem list.    [facility] AM-PAC  \"6 Clicks\" Daily Activity Inpatient Short Form   1. Putting on and taking off regular lower body clothing? 3 - A Little   2. Bathing (including washing, rinsing, drying)? 4 - None   3. Toileting, which includes using toilet, bedpan or urinal? 3 - A Little   4. Putting on and taking off regular upper body clothing? 2 - A Lot   5. Taking care of personal grooming such as brushing teeth? 4 - None   6. Eating meals? 4 - None   Daily Activity Raw Score (Score out of 24.Lower scores equate to lower levels of function) 20   Total Evaluation Time   Total Evaluation Time (Minutes) 2     "

## 2018-12-07 NOTE — PLAN OF CARE
Problem: Patient Care Overview  Goal: Plan of Care/Patient Progress Review  Outcome: No Change  [id number]: AVSS on 2 LPM O2 per NC/RA. Blood pressures much improved, did not meet hydralazine parameters. Pain managed with scheduled oxycontin and tylenol. Nausea managed with scheduled zofran. Port infusing D5 1/2 NS at 100 ml/hr. Calorie counts started at 0000, has been NPO since 0000 per MD note. Did tolerate a few bites of food brought by family for dinner. Voiding spontaneously. No BM overnight, consider PRN suppository this AM if pt agreeable. Up to bathroom with SBA, calling appropriately. Possible EGD today per MD note, not scheduled at this time. Continue with POC.

## 2018-12-07 NOTE — PLAN OF CARE
Transfer  Transferred to: 7D  Via:Wheel chair  Reason for transfer:Pt no longer appropriate for 6B- improved patient condition  Family: Aware of transfer  Belongings: Packed and sent with pt  Chart: Delivered with pt to next unit  Medications: Meds sent to new unit with pt  Report given to: 7D nurse  Pt status: VSS, Hypertensive hydralazine prn available not available on this unit reassess for need on 7D.

## 2018-12-07 NOTE — PLAN OF CARE
Problem: Patient Care Overview  Goal: Plan of Care/Patient Progress Review  Discharge Planner OT   Patient plan for discharge: home with assist   Current status: OT eval completed, tx initiated, discussed OT role and POC and pt demos understanding. Pt CGA for shower transfer and SBA for shower in standing, demonstrating Mod-I for all parts of shower. Pt transferred to toilet with SBA to complete dressing to conserve energy. Pt required Ezekiel for LBD and MaxA to genoveva socks while seated. Pt moves well in room with SBA without assistive device though declines functional ambulation or other OOB activity 2/2 fatigue reported.   Barriers to return to prior living situation: medical status, fatigue, deconditioning  Recommendations for discharge: home with assist as needed for heavier tasks   Rationale for recommendations: Fatigue is most limiting factor today though patient demonstrates good insight into her limitations. She is below baseline for activity tolerance though requires Ezekiel overall with ADLs demonstrated today and reports she has adequate support from her daughter who she lives with. Will continue to monitor medical status while in IP.        Entered by: Sana Mondragon 12/07/2018 11:52 AM

## 2018-12-08 NOTE — PLAN OF CARE
Focus: Discharge  D: Discharge orders prepared and signed by provider. Discharge meds ready at 3rd floor discharge pharmacy.   I: De-accessed pt's port a cath. Reviewed discharge instructions with pt and her son. Assisted pt to gather her personal belongings. Faxed discharge orders to [facility].  A: Afebrile, VSS. Denied pain or nausea. Tolerating fair amount of PO intake. Up ambulating ab ibis. Medium BM this morning.  P: Discharge to home with [facility] and follow up in clinic next Wednesday 12/12/18.

## 2018-12-08 NOTE — DISCHARGE SUMMARY
[facility], [place]    Discharge Summary  Hematology / Oncology    Date of Admission:  12/5/2018  Date of Discharge:  12/8/18  Discharging Provider: Kavitha Garcia MD/PhD  Discharging Attending: Camilo Wiggins MD  Date of Service: 12/8/18    Discharge Diagnoses     Stage IV squamous cell lung carcinoma    Nausea, vomiting, anorexia and generalized weakness (improved)    Severe (protein-calorie) malnutrition    Atrial fibrillation w/rapid ventricular response (resolved)    Malignant pericardial effusion    Cancer-related pain    Hyponatremia    History of Present Illness   Barbie Deluca is an 78 year old woman with recently diagnosed squamous cell carcinoma of the left lung (s/p carbo/taxol x 1 cycle). She was admitted to the hospital from clinic for evaluation of generalized weakness, nausea, vomiting, anorexia and pain.    Hospital Course   Barbie Deluca was admitted on 12/5/2018.    The following problems were addressed during her hospitalization:    Generalized weakness, nausea, vomiting, and anorexia with associated weight loss.  Patient notes that her overall state of health has declined since receiving the first cycle of chemotherapy (carboplatin + paclitaxel) on 11/21/18. She had been sleeping most of the day, tolerating just a few sips of liquids, and vomits often shortly after eating. She saw her PCP for this on 12/4/18 and was started on Remeron 7.5 mg at bedtime. AXR on admission showed moderate stool burden, and she admitted to no major BMs for many days prior to admission. Still passing gas. We consulted nutrition, PT, OT, GI and palliative care for assistance.  - PT/OT consulted. Recommended home with assistance.   - Palliative care team recommended a trial of steroids, olanzepine and scheduled anti-emetics. She noted improvement in appetite, energy and nausea with dexamethasone 8 mg PO daily and olanzepine 2.5 mg QHS. We transitioned this to 4 mg  PO daily at the time of discharge. Scheduled ondansetron was helpful, but she felt more comfortable using this as needed rather than scheduled at home.   - GI was also consulted for work-up of N/V and PO intolerance. We felt there may be extrinsic esophageal obstruction related to the tumor, or possible esophageal candidiasis. Given that her symptoms improved markedly with steroids and anti-emetics, further imaging (such as upper GI series) and invasive testing (such as EGD) were deferred for now.  - She should also continue an aggressive bowel regimen, as this likely also contributed to nausea and poor PO intake prior to admission.       Severe (protein-calorie) malnutrition.  Inadequate protein-energy intake related to decreased appetite, poor PO intake, nausea/vomiting, and taste changes related to chemotherapy. This was evidenced by 32% weight loss in the past 6 months and moderate-severe muscle and fat wasting noted on physical exam.   - Nutrition services consulted. Recommended nutritional supplements as tolerated - she prefers supplements that are not too sweet.    A.fib/flutter with RVR (resolved).  Patient was noted to develop A.fib/flutter w/RVR shortly after admission. No prior history of A.fib. CT PE was negative on 12/15/18. She was given IV fluids and metoprolol 5 mg IV x 1 on 12/5/18, and shortly thereafter converted back to NSR. She was also started on metoprolol 12.5 mg PO BID during this admission. Given short duration of A.fib, we opted not to anticoagulate. A.fib was likely driven by relative dehydration, though she is certainly at high-risk of this happening again.  - Should continue metoprolol 12.5 mg PO BID, and readdress HR at upcoming clinic visits.     Small malignant pericardial effusion.  Echocardiogram from 12/6/18 showed a small circumferential pericardial effusion with prominent fibrinous material and thickened pericardium, concerning for mixed constrictive effusive physiology. She  "remained hemodynamically stable and asymptomatic. She was evaluated by the cardiology team, and was felt not to be amenable to pericardiocentesis due to its very small size. Per cardiology, the constriction mention in the echocardiogram report is very subtle and not hemodynamically significant.  - No further cardiology work-up is necessary, unless she becomes symptomatic or the pericardial effusion is found to be enlarged.      Cancer-related pain.  Pain mostly located in area of tumor on left side of chest and underneath the left breast. Had been using ice packs on this region at home. She also reports \"all over\" pain in body, which may be secondary to recent Neulasta. Recently started OxyContin 10 mg BID with Percocet 5-325 mg approximately 4 times per day. Pain was overall well controlled during this hospitalization.  - Continue OxyContin and Percocet.        Hyponatremia.  Present on recent labs, as low as 127. Improved with IV fluids. Suspect secondary to dehydration and poor PO intake rather than SIADH or paraneoplastic syndrome.      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  Follows with Dr. Solares. Presented with left arm pain. CT scan on 10/24/2018 showed a cystic mass involving the left side of the mediastinum extending from the left hilum towards the cardiac apex, abutting the pericardium over a long extent peripherally. There is an enhancing soft tissue element as well surrounding atelectatic lingula. MRA brain on 10/31/2018 showed no evidence of brain metastases. PET/CT on 11/13/2018 and CT-guided biopsy on 11/01/2018 were consistent with squamous cell carcinoma. Initial treatment plan was for 2-3 cycles of neoadjuvant Carbo/Taxol followed by CT scan and sequential radiation, followed by maintenance immune therapy. She received C1D1 Carbo/Taxol on 11/21/18 with Neulasta support.  - Cycle 1 was not very well tolerated. She has an appointment with Dr. Solares's [facility]I in the next few days to " consider the next cycle.    Kavitha Garcia MD/PhD  Heme/Onc Fellow    Significant Results and Procedures   TRANSTHORACIC ECHOCARDIOGRAM, 12/6/18:  Interpretation Summary:  There is a small circumferential pericardial effusion with prominent fibrinous material and thickened pericardium. Minimal right atrial collapse but no RV collapse. IVC does not appear to collapse with inspiration suggesting increased RA pressure. Tissue Doppler with low E' velocities but lateral is lower than medial suggesting annulus reversus or constriction. Hepatic and tricuspid inflow with increased respiratory variation but not mitral inflow. Septal bounce not very prominent. Concerns for mixed constrictive effusive physiology.    CT CHEST PULMONARY EMBOLISM W CONTRAST 12/5/2018 6:34 PM  Comparison: PET/CT dated 11/13/2018  History: New hypoxia and afib in lung cancer patient; rule out PE;  squamous cell carcinoma of the left lung?                                              Impression:   2.  No pulmonary embolism as questioned.  3.  Enlarging left upper mass which measures up to 7.7 cm with  associated left upper and lower bronchial narrowing, invading the  pericardium, compatible with patient's known squamous cell carcinoma.  4.  Increased now large pericardial effusion.  5.  New left small pleural effusion with associated atelectasis.  6.  Findings concerning for bilateral implant rupture. Additional  evaluation with breast MRI could be considered if it becomes  clinically relevant.     Code Status   FULL CODE    Primary Care Physician   Erik Reaves    Physical Exam   BP (!) (P) 163/96 (BP Location: Right arm)   Pulse 73   Temp (P) 97.1  F (36.2  C) (Oral)   Resp (P) 16   Wt 60.3 kg (133 lb)   SpO2 (P) 91%   BMI 24.33 kg/m    Constitutional: Pleasant female seen laying in bed. No apparent distress, appears chronically ill.  Eyes: Lids and lashes normal, sclera clear, conjunctiva normal.  ENT: Normocephalic, oral pharynx with  moist mucus membranes.   Respiratory: No increased work of breathing, good air exchange but decreased on the left side.  Cardiovascular: Regular rate and rhythm, normal S1 and S2, and no murmur noted.  GI: No masses or scars. +BS, hypoactive. Soft. No tenderness on palpation.  Skin: No bruising or bleeding, no redness, warmth, or swelling, no rashes, no lesions, no jaundice.  Extremities: There is no redness, warmth, or swelling of the joints. No lower extremity edema. No cyanosis.  Neurologic: Awake, alert, oriented to name, place and time.      Discharge Disposition   Discharged to home with assist from family members.  Condition at discharge: Stable    Consultations This Hospital Stay   NUTRITION SERVICES ADULT IP CONSULT  OCCUPATIONAL THERAPY ADULT IP CONSULT  PHYSICAL THERAPY ADULT IP CONSULT  PALLIATIVE CARE ADULT IP CONSULT  GI LUMINAL ADULT IP CONSULT  CARDIOLOGY GENERAL ADULT IP CONSULT    Discharge Orders     Home care nursing referral     Home Care PT Referral for Hospital Discharge     Home Care OT Referral for Hospital Discharge     MD face to face encounter   Documentation of Face to Face and Certification for Home Health Services    I certify that patient: Barbie Deluca is under my care and that I, or a nurse practitioner or physician's assistant working with me, had a face-to-face encounter that meets the physician face-to-face encounter requirements with this patient on: 12/7/2018.    This encounter with the patient was in whole, or in part, for the following medical condition, which is the primary reason for home health care: Patient with past medical history of hypertension with recent diagnosis of squamous cell carcinoma of left lung status post 1 cycle of carbotaxol now admitted with failure to thrive with weakness nausea vomiting, constipation and pain with some degree of respiratory distress.  She has generalized weakness anorexia and weight loss.   I certify that, based on my  findings, the following services are medically necessary home health services: Nursing, Occupational Therapy and Physical Therapy.    My clinical findings support the need for the above services because: Nurse is needed: To assess status after changes in medications or other medical regimen. and To provide assessment and oversight required in the home to assure adherence to the medical plan due to: weakness ftt., Occupational Therapy Services are needed to assess and treat cognitive ability and address ADL safety due to impairment in mobility/function. and Physical Therapy Services are needed to assess and treat the following functional impairments: mobility, home safety.    Further, I certify that my clinical findings support that this patient is homebound (i.e. absences from home require considerable and taxing effort and are for medical reasons or [employer] services or infrequently or of short duration when for other reasons) because: Requires assistance of another person or specialized equipment to access medical services because patient: Is unable to exit home safely on own due to: weakness, immobility. and Requires supervision of another for safe transfer...    Based on the above findings. I certify that this patient is confined to the home and needs intermittent skilled nursing care, physical therapy and/or speech therapy.  The patient is under my care, and I have initiated the establishment of the plan of care.  This patient will be followed by a physician who will periodically review the plan of care.  Physician/Provider to provide follow up care: Erik Reaves    Attending hospital physician (the Medicare certified [place] provider): Kelsey Bell MD  Physician Signature: See electronic signature associated with these discharge orders.  Date: 12/7/2018     Reason for your hospital stay   You were hospitalized for evaluation of nausea and vomiting. This improved after starting steroids. No additional  intervention was needed.     Adult [facility] Follow-up and recommended labs and tests   Follow-up with Dr. Solares within the next 1-2 weeks.    Appointments on [place] and/or [facility] (with [facility] or [facility] provider or service). Call [phone] if you haven't heard regarding these appointments within 7 days of discharge.     Activity   Your activity upon discharge: activity as tolerated and no driving for today     When to contact your care team   Call the triage line if you develop worsening nausea/vomiting and are unable to keep fluids down.     Full Code     Diet   Follow this diet upon discharge: regular diet as tolerated       Discharge Medications   Current Discharge Medication List      START taking these medications    Details   dexamethasone (DECADRON) 4 MG tablet Take 4 mg by mouth daily.  Qty: 30 tablet, Refills: 1    Associated Diagnoses: Malignant cachexia (H)      metoprolol tartrate (LOPRESSOR) 25 MG tablet Take 0.5 tablets (12.5 mg) by mouth 2 times daily  Qty: 60 tablet, Refills: 0    Associated Diagnoses: Atrial fibrillation, unspecified type (H)      OLANZapine (ZYPREXA) 2.5 MG tablet Take 1 tablet (2.5 mg) by mouth At Bedtime  Qty: 30 tablet, Refills: 1    Associated Diagnoses: Nausea and vomiting, intractability of vomiting not specified, unspecified vomiting type      senna-docusate (SENOKOT-S/PERICOLACE) 8.6-50 MG tablet Take 2-4 tablets by mouth 2 times daily  Qty: 60 tablet, Refills: 1    Associated Diagnoses: Drug-induced constipation         CONTINUE these medications which have NOT CHANGED    Details   loratadine (CLARITIN) 10 MG tablet Take 1 tablet (10 mg) by mouth daily Take day of chemotherapy and for 4 days afterwards.      LORazepam (ATIVAN) 0.5 MG tablet Take 1 tablet (0.5 mg) by mouth every 4 hours as needed (Anxiety, Nausea/Vomiting or Sleep)  Qty: 30 tablet, Refills: 2    Associated Diagnoses: Non-small cell cancer of left lung (H); Encounter for antineoplastic  chemotherapy; Encounter for other specified aftercare      losartan (COZAAR) 50 MG tablet take 1 tablet by mouth daily  Refills: 11      mirtazapine (REMERON) 7.5 MG tablet Take 1 tablet (7.5 mg) by mouth At Bedtime      ondansetron (ZOFRAN) 8 MG tablet Take 0.5 tablets (4 mg) by mouth every 8 hours as needed for nausea    Associated Diagnoses: Non-small cell cancer of left lung (H); Nausea      oxyCODONE (OXYCONTIN) 10 MG 12 hr tablet Take 1 tablet (10 mg) by mouth every 12 hours maximum 2 tablet(s) per day  Qty: 60 tablet, Refills: 0    Associated Diagnoses: 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); Non-small cell cancer of left lung (H); Other constipation      oxyCODONE-acetaminophen (PERCOCET) 5-325 MG per tablet Take 1 tablet by mouth every 6 hours as needed for severe pain  Qty: 50 tablet, Refills: 0    Associated Diagnoses: 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); Non-small cell cancer of left lung (H); Other constipation      polyethylene glycol (MIRALAX/GLYCOLAX) Packet Take 17 g by mouth daily  Qty: 7 packet, Refills: 3    Associated Diagnoses: Non-small cell cancer of left lung (H); 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); Other constipation           Allergies   Allergies   Allergen Reactions     No Clinical Screening - See Comments Rash     Nicotine Patch     Data   Most Recent 3 CBC's:  Recent Labs   Lab Test 12/08/18  0558 12/07/18  0557 12/06/18  0513   WBC 32.4* 29.9* 26.8*   HGB 10.1* 9.7* 10.1*   MCV 91 92 93   * 403 364      Most Recent 3 BMP's:  Recent Labs   Lab Test 12/08/18  0558 12/07/18  1717 12/07/18  0557 12/06/18  0513   * 128* 129* 132*   POTASSIUM 4.6  --  4.4 4.2   CHLORIDE 96  --  97 97   CO2 28  --  25 23   BUN 10  --  10 10   CR 0.61  --  0.56 0.63   ANIONGAP 7  --  7 11   PANTERA 8.3*  --  7.5* 8.0*   *  --  174* 120*     Most Recent 2 LFT's:  Recent Labs   Lab Test 12/06/18  0513 12/05/18  1113   AST 18 17   ALT 17 18   ALKPHOS 136  145   BILITOTAL 0.3 0.4

## 2018-12-08 NOTE — PROGRESS NOTES
Calorie Count    Intake recorded for: 12/7      Total Kcals: 201         Total Protein: 7g    Kcals from Hospital Food: 1                         Protein: 0g    Kcals from Outside Food (avergae):200         Protein: 7g     # Meals Recorded: 50% sausage [employer] from [employer], less then 25% broth     # Supplements Recorded: 0

## 2018-12-08 NOTE — PLAN OF CARE
Problem: Patient Care Overview  Goal: Plan of Care/Patient Progress Review  Outcome: No Change  1500 - 0730    Max /103 after walk, improved to 147/83 at rest. OVS stable. 2 LPM satting mid 90's. Acute confusion this AM - disoriented to time and place - notified moonlighter, waiting for response. Pt was reoriented to time and place, BA on. Sodium recheck came back 128 - moonlighter notified, no interventions at this time. Pt c/o of pain in abdominal area - 5mg PO oxycodone x1 administered. Pt had hard time falling asleep - 0.5mg PO ativan x1 administered. No bowel movement, mag citrate and milk of magnesium now ordered PRN. Pt had very little intake today - poor appetite. Continue to monitor.

## 2018-12-09 NOTE — PLAN OF CARE
Occupational Therapy Discharge Summary    Reason for therapy discharge:    Discharged to home with home therapy.    Progress towards therapy goal(s). See goals on Care Plan in [facility] electronic health record for goal details.  Goals not met.  Barriers to achieving goals:   discharge from facility.    Therapy recommendation(s):    Continued therapy is recommended.  Rationale/Recommendations:  Pt will continue with therapy at home.

## 2018-12-12 NOTE — PROGRESS NOTES
Social Work Follow-Up Encounter Visit  Oncology Clinic    Data/Intervention:  Patient Name:  Barbie Deluca  /Age:  1940 (78 year old)    Reason for Follow-Up:  Hospice information    Collaborated With:    -[facility] staff  -pt's dtr  -pt's son  -RNCC Kylee    Resources Provided:  Choosing Hospice booklet    Assessment:  Met with pt's daughter and pt's son after appt whom inquiring about general hospice information.  Pt's dtr and son discussed that at this time they know pt is currently deciding how she wants to move forward but they know they need help in the home and anticipate moving towards the hospice path of care.  Pt's dtr lives with pt and primary caregiver.  Reviewed Choosing Hospice booklet and provided general hospice information, including anticipated insurance coverage for services and support in the home.  Provided pt's son and dtr with Choosing Hospice booklet.  Pt's family aware that they have meeting with [facility] / [facility] this week, and RNCC updated Home Care/hospice staff re: pt/family situation.   Pt's family appreciative of hospice booklet and information to further review and discuss.        Plan:  Provided pt's family with SW's contact information and availability.         Estelita Mojica (Martens), SANDI, MSW   - [facility]  Phone: [phone]  Pager: [phone]  2018

## 2018-12-12 NOTE — LETTER
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  CT scan on 10/24/2018 showed a cystic mass involving the left side of the mediastinum extending from the left hilum towards the cardiac apex, abutting the pericardium over a long extent peripherally.  Enhancing soft tissue element as well surrounding atelectatic lingula.  MRA brain 10/31/2018 showed no brain metastases.  PET/CT scan 11/13/2018 and CT-guided biopsy 11/01/2018 consistent with squamous cell carcinoma. She started on neoadjuvant treatment with carboplatin and Taxol on 11/21/18.  Plan is to complete 2-3 cycles followed by a CT scan and a plan for sequential radiation followed by maintenance immune therapy. She was hospitalized from 12/5-12/8/18 for failure to thrive and pneumonia. She had a brief episode of a.fib/flutter that resolved on its own during the hospitalization. She was also found to have a small malignant pericardial effusion. Due to hypoxia, a chest CT PE protocol was performed on 12/5/18, which was negative for DVT, but did show progressive disease with enlargement of the left upper lobe mass. She comes in today for routine hospital follow up.    Interval History:  Patient reports that she had significant abdominal fullness after all the IV fluids in the hospital.  She feels this is better now.  She continues to eat very little due to poor taste.  She ate 1/4 piece of toast with peanut butter, a half a cup of pudding, 2 dates, 2 potato chips and some broth yesterday.  She is drinking about 25 ounces of water and 7 ounces of cranberry juice per day.  She has been taking MiraLAX once a day and senna S2 per day.  She has a lot of abdominal  gas and doing a suppository seems to help with this.  She has started with home care nursing and is going to be set up with PT and OT.  Her son requests additional services and is interested in looking into private pay services as well.  She continues to have pain despite taking her OxyContin and 4 Percocet per day.  She feels the Percocet lasts about 5 hours.  She does also have some tight muscles.  She continues to be very tired.  She is not sure if she wants to pursue additional treatment for her cancer.  She denies other concerns.      Current Outpatient Medications   Medication Sig Dispense Refill     dexamethasone (DECADRON) 4 MG tablet Take 4 mg by mouth daily. 30 tablet 1     loratadine (CLARITIN) 10 MG tablet Take 1 tablet (10 mg) by mouth daily Take day of chemotherapy and for 4 days afterwards.       LORazepam (ATIVAN) 0.5 MG tablet Take 1 tablet (0.5 mg) by mouth every 4 hours as needed (Anxiety, Nausea/Vomiting or Sleep) 30 tablet 2     losartan (COZAAR) 50 MG tablet take 1 tablet by mouth daily  11     metoprolol tartrate (LOPRESSOR) 25 MG tablet Take 0.5 tablets (12.5 mg) by mouth 2 times daily 60 tablet 0     mirtazapine (REMERON) 7.5 MG tablet Take 1 tablet (7.5 mg) by mouth At Bedtime       OLANZapine (ZYPREXA) 2.5 MG tablet Take 1 tablet (2.5 mg) by mouth At Bedtime 30 tablet 1     ondansetron (ZOFRAN) 8 MG tablet Take 0.5 tablets (4 mg) by mouth every 8 hours as needed for nausea       oxyCODONE (OXYCONTIN) 10 MG 12 hr tablet Take 1 tablet (10 mg) by mouth every 12 hours maximum 2 tablet(s) per day 60 tablet 0     oxyCODONE-acetaminophen (PERCOCET) 5-325 MG tablet Take 1-2 tablets by mouth every 4 hours as needed for severe pain (max 9/day) 120 tablet 0     polyethylene glycol (MIRALAX/GLYCOLAX) Packet Take 17 g by mouth daily 7 packet 3     senna-docusate (SENOKOT-S/PERICOLACE) 8.6-50 MG tablet Take 2-4 tablets by mouth 2 times daily 60 tablet 1      Physical Exam:  General: The patient is a  pleasant female. She appears moderately cachetic.  /87   Pulse 86   Temp 97.6  F (36.4  C) (Oral)   Wt 54.3 kg (119 lb 11.2 oz)   SpO2 94%   BMI 21.89 kg/m     Wt Readings from Last 10 Encounters:   12/12/18 54.3 kg (119 lb 11.2 oz)   12/07/18 60.3 kg (133 lb)   12/05/18 55.4 kg (122 lb 1.6 oz)   11/28/18 56.2 kg (123 lb 14.4 oz)   11/21/18 55.4 kg (122 lb 1.6 oz)   11/20/18 54.9 kg (121 lb)   11/20/18 55.1 kg (121 lb 6.4 oz)   11/13/18 56.5 kg (124 lb 9 oz)   11/07/18 57 kg (125 lb 11.2 oz)   HEENT: EOMI, PERRL. Sclerae are anicteric. Oral mucosa is pink and mildly dry with no lesions or thrush.   Lymph: Neck is supple with no lymphadenopathy in the cervical or supraclavicular areas.   Heart: Regular rate and rhythm.   Lungs: Clear to auscultation bilaterally.   Abdomen: Bowel sounds present, soft, nontender with no palpable hepatosplenomegaly or masses.   Extremities: No lower extremity edema noted bilaterally.   Neuro: Cranial nerves II through XII are grossly intact.  Skin: No rashes, petechiae, or bruising noted on exposed skin.    Labs:   12/12/2018 09:25   Sodium 130 (L)   Potassium 3.8   Chloride 94   Carbon Dioxide 27   Urea Nitrogen 12   Creatinine 0.70   GFR Estimate 81   GFR Estimate If Black >90   Calcium 7.9 (L)   Anion Gap 9   Albumin 2.4 (L)   Protein Total 6.1 (L)   Bilirubin Total 0.4   Alkaline Phosphatase 129   ALT 44   AST 44   Glucose 102 (H)   WBC 15.3 (H)   Hemoglobin 9.4 (L)   Hematocrit 28.9 (L)   Platelet Count 529 (H)   RBC Count 3.27 (L)   MCV 88   MCH 28.7   MCHC 32.5   RDW 14.2   Diff Method Automated Method   % Neutrophils 80.8   % Lymphocytes 9.7   % Monocytes 7.5   % Eosinophils 0.1   % Basophils 0.3   % Immature Granulocytes 1.6   Nucleated RBCs 0   Absolute Neutrophil 12.4 (H)   Absolute Lymphocytes 1.5   Absolute Monocytes 1.2   Absolute Eosinophils 0.0   Absolute Basophils 0.1   Abs Immature Granulocytes 0.3   Absolute Nucleated RBC 0.0     Assessment and Plan:  1.  Lung cancer.  Patient's chest CT while she was hospitalized showed significant growth in her tumor.  I am concerned that she is not responding to the chemotherapy.  I have reviewed this with Dr. Solares and we will plan to obtain a fresh biopsy via IR in order to be able to test for PDL 1.  In the event that she is a high expresser, we may consider immune therapy.  Alternatively, we may consider additional chemotherapy.  Patient is at this point not sure if she wants to pursue additional treatment of any kind, but is willing to set up the biopsy.  She is open to an informational hospice consult and I have placed that order.  She will follow up with me in 2 weeks and see Dr. Solares about 4 weeks.    2. Pain.  Patient has pain in her chest secondary to her tumor and also has abdominal pain secondary to constipation and gas.  She continues to take OxyContin 10 mg twice a day and is taking 4 Percocet tablets per day.  We discussed that she may increase her Percocet use to every 4 hours as needed.  As she has some nausea associated with taking her pills, we also discussed the potential of switching OxyContin to fentanyl patches.  She prefers to hold off on this for now.  We discussed eating even something small prior to taking her pain medication and steroid in order to minimize nausea associated with these medications.    3. Constipation.  Difficult to assess given her poor p.o. intake.  She will continue on MiraLAX once a day and Senna-S 2 per day.  She may continue to use suppositories to help with her abdominal gas.    4. Anorexia/dysguesia.  She remains on dexamethasone 4 mg every morning.  She is unsure if this is helping, though it was found to be helpful while she was in the hospital.  She will continue on the steroid for now.    5. Deconditioning. Plan for home PT/OT. Will look into increasing other home care services.     Atiya Houston PA-C  [facility]  [address] [place]  [address]  [phone]

## 2018-12-12 NOTE — NURSING NOTE
"Oncology Rooming Note    December 12, 2018 9:36 AM   Barbie Deluca is a 78 year old female who presents for:    Chief Complaint   Patient presents with     Lab Only     labs drawn via port by RN in lab, saline and heparin locked, vitals completed     Oncology Clinic Visit     [facility] RETURN- MEDIASTINAL MASS     Initial Vitals: /87   Pulse 86   Temp 97.6  F (36.4  C) (Oral)   Wt 54.3 kg (119 lb 11.2 oz)   SpO2 94%   BMI 21.89 kg/m   Estimated body mass index is 21.89 kg/m  as calculated from the following:    Height as of 11/28/18: 1.575 m (5' 2\").    Weight as of this encounter: 54.3 kg (119 lb 11.2 oz). Body surface area is 1.54 meters squared.  Moderate Pain (5) Comment: Data Unavailable   No LMP recorded. Patient is postmenopausal.  Allergies reviewed: Yes  Medications reviewed: Yes    Medications: MEDICATION REFILLS NEEDED TODAY. Provider was notified.  Pharmacy name entered into [facility]: [facility] [address] [place] [address] [address] AT [facility] [address]    Clinical concerns: Percocet and Oxycodone refill. Celso was notified.    10 minutes for nursing intake (face to face time)     Dav Valencia LPN            "

## 2018-12-12 NOTE — NURSING NOTE
Chief Complaint   Patient presents with     Lab Only     labs drawn via port by RN in lab, saline and heparin locked, vitals completed

## 2018-12-12 NOTE — Clinical Note
12/12/2018       RE: Barbie Deluca  [address] [facility] [address]     Dear Colleague,    Thank you for referring your patient, Barbie Deluca, to the [facility]. Please see a copy of my visit note below.    Interval History:   Patient reports that she had significant abdominal fullness after all the IV fluids in the hospital.  She feels this is better now.  She continues to eat very little due to poor taste.  She ate 1/4 piece of toast with peanut butter, a half a cup of pudding, 2 dates, 2 potato chips and some broth yesterday.  She is drinking about 25 ounces of water and 7 ounces of cranberry juice per day.  She has been taking MiraLAX once a day and senna S2 per day.  She has a lot of abdominal gas and doing a suppository seems to help with this.  She has started with home care nursing and is going to be set up with PT and OT.  Her son requests additional services and is interested in looking into private pay services as well.  She continues to have pain despite taking her OxyContin and 4 Percocet per day.  She feels the Percocet lasts about 5 hours.  She does also have some tight muscles.  She continues to be very tired.  She is not sure if she wants to pursue additional treatment for her cancer.  She denies other concerns.    Assessment and Plan:  1. Lung cancer.  Patient's chest CT while she was hospitalized showed significant growth in her tumor.  I am concerned that she is not responding to the chemotherapy.  I have reviewed this with Dr. Solares and we will plan to obtain a fresh biopsy via IR in order to be able to test for PDL 1.  In the event that she is a high expresser, we may consider immune therapy.  Alternatively, we may consider additional chemotherapy.  Patient is at this point not sure if she wants to pursue additional treatment of any kind, but is willing to set up the biopsy.  She is open to an informational hospice consult and I have placed that  order.  She Will follow up with me in 2 weeks and see Dr. Solares about 4 weeks.    2. Pain.  Patient has pain in her chest secondary to her tumor and also has abdominal pain secondary to constipation and gas.  She continues to take OxyContin 10 mg twice a day and is taking 4 Percocet tablets per day.  We discussed that she may increase her Percocet use to every 4 hours as needed.  As she has some nausea associated with taking her pills, we also discussed the potential of switching OxyContin to fentanyl patches.  She prefers to hold off on this for now.  We discussed eating even something small prior to taking her pain medication and steroid in order to minimize nausea associated with these medications.    3. Constipation.  Difficult to assess given her poor p.o. intake.  She will continue on MiraLAX once a day and senna S2 per day.  She may continue to use suppositories to help with her abdominal gas.    4. Anorexia/dysguesia.  She remains on dexamethasone 4 mg every morning.  She is unsure if this is helping, though it was found to be helpful while she was in the hospital.  She will continue on the steroid for now.    5. Deconditioning. Plan for home PT/OT. Will look into increasing other home care services.     Atiya Houston PA-C  [facility]  [address] [place] [address] [phone]      Again, thank you for allowing me to participate in the care of your patient.      Sincerely,    Atiya Houston PA-C

## 2018-12-12 NOTE — PROGRESS NOTES
[id number]: Study Visit Note   Subject name: Barbie Deluca     Visit: Baseline Toenail Collection    Patient forgot to bring toenails today. I will check back with her next visit.    Sharon Kramer    :   Darline Crocker  Pager: [phone]  Clinical Research Coordinator:  Sharon Kramer  Phone: [phone]  Pager: [phone]

## 2018-12-12 NOTE — PROGRESS NOTES
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  CT scan on 10/24/2018 showed a cystic mass involving the left side of the mediastinum extending from the left hilum towards the cardiac apex, abutting the pericardium over a long extent peripherally.  Enhancing soft tissue element as well surrounding atelectatic lingula.  MRA brain 10/31/2018 showed no brain metastases.  PET/CT scan 11/13/2018 and CT-guided biopsy 11/01/2018 consistent with squamous cell carcinoma. She started on neoadjuvant treatment with carboplatin and Taxol on 11/21/18.  Plan is to complete 2-3 cycles followed by a CT scan and a plan for sequential radiation followed by maintenance immune therapy. She was hospitalized from 12/5-12/8/18 for failure to thrive and pneumonia. She had a brief episode of a.fib/flutter that resolved on its own during the hospitalization. She was also found to have a small malignant pericardial effusion. Due to hypoxia, a chest CT PE protocol was performed on 12/5/18, which was negative for DVT, but did show progressive disease with enlargement of the left upper lobe mass. She comes in today for routine hospital follow up.    Interval History:  Patient reports that she had significant abdominal fullness after all the IV fluids in the hospital.  She feels this is better now.  She continues to eat very little due to poor taste.  She ate 1/4 piece of toast with peanut butter, a half a cup of pudding, 2 dates, 2 potato chips and some broth yesterday.  She is drinking about 25 ounces of water and 7 ounces of cranberry juice per day.  She has been taking MiraLAX once a day and senna S2 per day.  She has a lot of abdominal gas and doing a suppository seems to help with this.  She has started with home care  nursing and is going to be set up with PT and OT.  Her son requests additional services and is interested in looking into private pay services as well.  She continues to have pain despite taking her OxyContin and 4 Percocet per day.  She feels the Percocet lasts about 5 hours.  She does also have some tight muscles.  She continues to be very tired.  She is not sure if she wants to pursue additional treatment for her cancer.  She denies other concerns.      Current Outpatient Medications   Medication Sig Dispense Refill     dexamethasone (DECADRON) 4 MG tablet Take 4 mg by mouth daily. 30 tablet 1     loratadine (CLARITIN) 10 MG tablet Take 1 tablet (10 mg) by mouth daily Take day of chemotherapy and for 4 days afterwards.       LORazepam (ATIVAN) 0.5 MG tablet Take 1 tablet (0.5 mg) by mouth every 4 hours as needed (Anxiety, Nausea/Vomiting or Sleep) 30 tablet 2     losartan (COZAAR) 50 MG tablet take 1 tablet by mouth daily  11     metoprolol tartrate (LOPRESSOR) 25 MG tablet Take 0.5 tablets (12.5 mg) by mouth 2 times daily 60 tablet 0     mirtazapine (REMERON) 7.5 MG tablet Take 1 tablet (7.5 mg) by mouth At Bedtime       OLANZapine (ZYPREXA) 2.5 MG tablet Take 1 tablet (2.5 mg) by mouth At Bedtime 30 tablet 1     ondansetron (ZOFRAN) 8 MG tablet Take 0.5 tablets (4 mg) by mouth every 8 hours as needed for nausea       oxyCODONE (OXYCONTIN) 10 MG 12 hr tablet Take 1 tablet (10 mg) by mouth every 12 hours maximum 2 tablet(s) per day 60 tablet 0     oxyCODONE-acetaminophen (PERCOCET) 5-325 MG tablet Take 1-2 tablets by mouth every 4 hours as needed for severe pain (max 9/day) 120 tablet 0     polyethylene glycol (MIRALAX/GLYCOLAX) Packet Take 17 g by mouth daily 7 packet 3     senna-docusate (SENOKOT-S/PERICOLACE) 8.6-50 MG tablet Take 2-4 tablets by mouth 2 times daily 60 tablet 1      Physical Exam:  General: The patient is a pleasant female. She appears moderately cachetic.  /87   Pulse 86   Temp 97.6  F  (36.4  C) (Oral)   Wt 54.3 kg (119 lb 11.2 oz)   SpO2 94%   BMI 21.89 kg/m    Wt Readings from Last 10 Encounters:   12/12/18 54.3 kg (119 lb 11.2 oz)   12/07/18 60.3 kg (133 lb)   12/05/18 55.4 kg (122 lb 1.6 oz)   11/28/18 56.2 kg (123 lb 14.4 oz)   11/21/18 55.4 kg (122 lb 1.6 oz)   11/20/18 54.9 kg (121 lb)   11/20/18 55.1 kg (121 lb 6.4 oz)   11/13/18 56.5 kg (124 lb 9 oz)   11/07/18 57 kg (125 lb 11.2 oz)   HEENT: EOMI, PERRL. Sclerae are anicteric. Oral mucosa is pink and mildly dry with no lesions or thrush.   Lymph: Neck is supple with no lymphadenopathy in the cervical or supraclavicular areas.   Heart: Regular rate and rhythm.   Lungs: Clear to auscultation bilaterally.   Abdomen: Bowel sounds present, soft, nontender with no palpable hepatosplenomegaly or masses.   Extremities: No lower extremity edema noted bilaterally.   Neuro: Cranial nerves II through XII are grossly intact.  Skin: No rashes, petechiae, or bruising noted on exposed skin.    Labs:   12/12/2018 09:25   Sodium 130 (L)   Potassium 3.8   Chloride 94   Carbon Dioxide 27   Urea Nitrogen 12   Creatinine 0.70   GFR Estimate 81   GFR Estimate If Black >90   Calcium 7.9 (L)   Anion Gap 9   Albumin 2.4 (L)   Protein Total 6.1 (L)   Bilirubin Total 0.4   Alkaline Phosphatase 129   ALT 44   AST 44   Glucose 102 (H)   WBC 15.3 (H)   Hemoglobin 9.4 (L)   Hematocrit 28.9 (L)   Platelet Count 529 (H)   RBC Count 3.27 (L)   MCV 88   MCH 28.7   MCHC 32.5   RDW 14.2   Diff Method Automated Method   % Neutrophils 80.8   % Lymphocytes 9.7   % Monocytes 7.5   % Eosinophils 0.1   % Basophils 0.3   % Immature Granulocytes 1.6   Nucleated RBCs 0   Absolute Neutrophil 12.4 (H)   Absolute Lymphocytes 1.5   Absolute Monocytes 1.2   Absolute Eosinophils 0.0   Absolute Basophils 0.1   Abs Immature Granulocytes 0.3   Absolute Nucleated RBC 0.0     Assessment and Plan:  1. Lung cancer.  Patient's chest CT while she was hospitalized showed significant growth in her  tumor.  I am concerned that she is not responding to the chemotherapy.  I have reviewed this with Dr. Solares and we will plan to obtain a fresh biopsy via IR in order to be able to test for PDL 1.  In the event that she is a high expresser, we may consider immune therapy.  Alternatively, we may consider additional chemotherapy.  Patient is at this point not sure if she wants to pursue additional treatment of any kind, but is willing to set up the biopsy.  She is open to an informational hospice consult and I have placed that order.  She will follow up with me in 2 weeks and see Dr. Solares about 4 weeks.    2. Pain.  Patient has pain in her chest secondary to her tumor and also has abdominal pain secondary to constipation and gas.  She continues to take OxyContin 10 mg twice a day and is taking 4 Percocet tablets per day.  We discussed that she may increase her Percocet use to every 4 hours as needed.  As she has some nausea associated with taking her pills, we also discussed the potential of switching OxyContin to fentanyl patches.  She prefers to hold off on this for now.  We discussed eating even something small prior to taking her pain medication and steroid in order to minimize nausea associated with these medications.    3. Constipation.  Difficult to assess given her poor p.o. intake.  She will continue on MiraLAX once a day and Senna-S 2 per day.  She may continue to use suppositories to help with her abdominal gas.    4. Anorexia/dysguesia.  She remains on dexamethasone 4 mg every morning.  She is unsure if this is helping, though it was found to be helpful while she was in the hospital.  She will continue on the steroid for now.    5. Deconditioning. Plan for home PT/OT. Will look into increasing other home care services.     Atiya Houston PA-C  [facility]  [address] [place] [address] [phone]

## 2018-12-12 NOTE — PROGRESS NOTES
Patient to be placed on Interventional Radiology schedule  for  a CT guided re-biopsy the Left lung lesion,  to obtain tissue for PD-L1 testing  High probability of insufficient tissue due to Necrotic mass        Discussed with Dr. Mirtha Sánchez IR RPA  [phone] Call pager  [phone] pager

## 2018-12-13 NOTE — PROGRESS NOTES
TC from Wanda [facility] She stated that she went out to see pt and pt's son today, and that pt declined increase in services at this time. She stated that she would prefer her daughter help her with self-care and showers. Pt and son did, however, request medical marijuana. Message sent to Atiya Houston to see if she would be okay certifying pt on the state registry. Await response.

## 2018-12-13 NOTE — PROGRESS NOTES
Per Atiya Houston, pt's family is requesting home health visits be maximized and that they intend to pay for private pay services to make up for anything that home enrique and insurance doesn't cover.     TC to [facility]. Spoke with pt's RN case manager regarding the aforementioned requests. Wanda stated she will be going to the home tomorrow, 12/13/2018, and will make sure pt is set up with as many services as she is eligible for between PT, OT, nurse visits, and home health aide visits.     TC to pt's daughter, Izzy, to review the plan per above. Izzy stated understanding.

## 2018-12-21 NOTE — PROGRESS NOTES
Per Atiya Houston PA-C writer called patient's daughter to see how she was doing since she had cancelled her biopsy.  Patient's daughter Izzy states she has been admitted to [facility] with a bleeding duodenal.  According to [facility] Everywhere patient's hemoglobin was 5.9 on 12/16/18 at [facility].  Izzy thinks she has received approximately 6 units of blood.    Advised patient's daughter to call nurse triage line at [phone] if she needs anything.   Janay Baron, RN BSN   [facility]  Nurse Coordinator

## 2018-12-26 NOTE — PROGRESS NOTES
Clinic Care Coordination Contact    Situation: Patient chart reviewed by care coordinator.    Background: Routed post hospital call.  Did chart review:  Admission Date: 12/16/2018     Discharge Provider: Beck Cantu              Discharge Date: 12/24/2018     Code Status: DNR     Diet:         Discharge Diet Order (720h ago, onward)     Start         12/24/18 0000   Discharge diet - Regular                                                     Activity:         Discharge Activity Order (720h ago, onward)     Start         12/24/18 0000   Activity as tolerated     Comments:  Rest when tired                Condition at Discharge: Fair     REASON FOR ADMISSION (See Admission Note for Details)   Admission Diagnoses: Hypovolemic shock (H) [R57.1]  Syncope [R55]  Anemia due to blood loss, acute [D62]  Duodenal ulcer  Proximal gastroduodenal artery aneurysm  Still full non-small cell lung cancer  Proximal atrial fibrillation  Acute hypomagnesemia     Problem list from this hospital stay:   Principal Problem:    Acute blood loss anemia  Active Problems:    Acute anemia    Hemorrhagic shock (H)    Non-small cell carcinoma of left lung, stage 4 (H)    Leukocytosis, unspecified type    Paroxysmal atrial fibrillation (H)    Abdominal pain, generalized    Hypomagnesemia    GI bleeding    Assessment: Patient is open to  home care and has Care Coordinator through [facility].      Plan/Recommendations: No outreach by this CC is needed.    Kaylee Greer,   [facility]  [email or web address]  [phone]

## 2018-12-28 PROBLEM — D62 ANEMIA DUE TO BLOOD LOSS, ACUTE: Status: ACTIVE | Noted: 2018-01-01

## 2018-12-28 NOTE — PATIENT INSTRUCTIONS
Contact Numbers    [facility] [place]: [phone]  [facility] Triage and After Hours Nurse Line:  [phone]    Please call the [facility] nurse triage or the after hours nurse line if you experience a temperature greater than or equal to 100.5, shaking chills, have uncontrolled nausea, vomiting and/or diarrhea, dizziness, lightheadedness, shortness of breath, chest pain, bleeding, unexplained bruising, or if you have any other new/concerning symptoms, questions or concerns.     If you are having any concerning symptoms or wish to speak to a provider before your next infusion visit, please call your care coordinator or triage to notify them so we can adequately serve you.     If you need a refill on a narcotic prescription or other medication, please call triage before your infusion appointment.       December 2018 Sunday Monday Tuesday Wednesday Thursday Friday Saturday                                 1       2     3     4     5    [facility] LAB DRAW   9:45 AM   (15 min.)   [facility] LAB DRAW   [facility] Lab Draw    [facility] RETURN  10:05 AM   (50 min.)   Atiya Houston PA-C   [facility] ONC INFUSION 120  11:30 AM   (120 min.)    ONCOLOGY INFUSION   [facility] NEW  12:30 PM   (75 min.)   Linus Tolentino MD   [facility]    XR CHEST 2 VIEWS   3:30 PM   (15 min.)   UCXR1   [facility] Xray    Admission   3:31 PM   Kelsey Bell MD   [address] [facility]   (Discharge: 12/8/2018)    XR ABDOMEN 2 VIEWS   4:30 PM   (15 min.)   UUXR3   [facility],  Radiology    CT CHEST PULMONARY EMBOLISM W   5:50 PM   (30 min.)   UUCT1   [facility], CT 6    ECH COMPLETE   7:20 AM   (60 min.)   UUECHIPR1   [facility],  Echocardiography 7    IP EVALUATION   6:00 AM   (60 min.)   Alba Tinajero, OT   [facility], Occupational Therapy 8       9     10     11     12    [facility]   9:00 AM   (15 min.)   [facility]  LAB DRAW   [facility] Lab Draw    [facility] RETURN   9:15 AM   (50 min.)   Atiya Houston PA-C   [facility] 13     14     15       16     17     18     19     20     21     22       23     24     25     26     27     28    [facility] LAB DRAW  12:45 PM   (15 min.)    [facility] LAB DRAW   [facility] Lab Draw    [facility] ONC INFUSION 180   1:30 PM   (180 min.)    ONCOLOGY INFUSION   [facility]    [facility] RETURN   3:35 PM   (50 min.)   Atiya Houston PA-C   [facility] 29    [facility] ONC INFUSION 240   8:30 AM   (240 min.)    ONCOLOGY INFUSION   [facility]   30     31 January 2019 Sunday Monday Tuesday Wednesday Thursday Friday Saturday             1     2     3     4     5       6     7     8     9     10     11     12       13     14     15     16    LEVEL 3  10:30 AM   (180 min.)    INFUSION CHAIR 20   [facility]    RETURN  11:30 AM   (15 min.)   Tg Solares MD   [facility] 17     18     19       20     21     22     23     24     25     26       27     28     29     30     31                               Lab Results:  Recent Results (from the past 12 hour(s))   Comprehensive metabolic panel    Collection Time: 12/28/18  1:05 PM   Result Value Ref Range    Sodium 138 133 - 144 mmol/L    Potassium 3.7 3.4 - 5.3 mmol/L    Chloride 105 94 - 109 mmol/L    Carbon Dioxide 25 20 - 32 mmol/L    Anion Gap 8 3 - 14 mmol/L    Glucose 99 70 - 99 mg/dL    Urea Nitrogen 19 7 - 30 mg/dL    Creatinine 0.67 0.52 - 1.04 mg/dL    GFR Estimate 85 >60 mL/min/[1.73_m2]    GFR Estimate If Black >90 >60 mL/min/[1.73_m2]    Calcium 8.1 (L) 8.5 - 10.1 mg/dL    Bilirubin Total 0.2 0.2 - 1.3 mg/dL    Albumin 2.3 (L) 3.4 - 5.0 g/dL    Protein Total 6.2 (L) 6.8 - 8.8 g/dL    Alkaline Phosphatase 100 40 - 150 U/L    ALT 27 0 - 50 U/L    AST 33 0 - 45 U/L   CBC with  platelets differential    Collection Time: 12/28/18  1:05 PM   Result Value Ref Range    WBC 7.9 4.0 - 11.0 10e9/L    RBC Count 2.71 (L) 3.8 - 5.2 10e12/L    Hemoglobin 7.8 (L) 11.7 - 15.7 g/dL    Hematocrit 24.9 (L) 35.0 - 47.0 %    MCV 92 78 - 100 fl    MCH 28.8 26.5 - 33.0 pg    MCHC 31.3 (L) 31.5 - 36.5 g/dL    RDW 16.9 (H) 10.0 - 15.0 %    Platelet Count 389 150 - 450 10e9/L    Diff Method Automated Method     % Neutrophils 77.4 %    % Lymphocytes 13.0 %    % Monocytes 6.4 %    % Eosinophils 2.4 %    % Basophils 0.4 %    % Immature Granulocytes 0.4 %    Nucleated RBCs 0 0 /100    Absolute Neutrophil 6.1 1.6 - 8.3 10e9/L    Absolute Lymphocytes 1.0 0.8 - 5.3 10e9/L    Absolute Monocytes 0.5 0.0 - 1.3 10e9/L    Absolute Eosinophils 0.2 0.0 - 0.7 10e9/L    Absolute Basophils 0.0 0.0 - 0.2 10e9/L    Abs Immature Granulocytes 0.0 0 - 0.4 10e9/L    Absolute Nucleated RBC 0.0

## 2018-12-28 NOTE — PROGRESS NOTES
Infusion Nursing Note:  Barbie Deluca presents today for IVF.    Patient seen by provider today: Yes: BLAS Cortez after infusion appointment.   present during visit today: Not Applicable.    Note: Patient arrived recently discharged from an outside hospital. Patient has been home for about four days now and continues to be very weak and tired. States she is SOB on exertion, has intermittent chills but no recorded fevers, and has been having daily black, tarry stools. Unfortunately, Hgb still not stable today, with Hgb count of 7.8.     After meeting with Atiya NASH in infusion, decision made for pt to receive 2 units of PRBC's tomorrow. Patient will then follow up with her GI team on Monday. Request also sent to scheduling to make appt's for labs/possible PRBC next Tuesday/Friday.    Type and Screen added to today's labs and blood prepared. Blood consent completed.    Intravenous Access:  Implanted Port.    Treatment Conditions:  Lab Results   Component Value Date    HGB 7.8 12/28/2018     Lab Results   Component Value Date    WBC 7.9 12/28/2018      Lab Results   Component Value Date    ANEU 6.1 12/28/2018     Lab Results   Component Value Date     12/28/2018      Lab Results   Component Value Date     12/28/2018                   Lab Results   Component Value Date    POTASSIUM 3.7 12/28/2018           Lab Results   Component Value Date    MAG 1.8 12/08/2018            Lab Results   Component Value Date    CR 0.67 12/28/2018                   Lab Results   Component Value Date    PANTERA 8.1 12/28/2018                Lab Results   Component Value Date    BILITOTAL 0.2 12/28/2018           Lab Results   Component Value Date    ALBUMIN 2.3 12/28/2018                    Lab Results   Component Value Date    ALT 27 12/28/2018           Lab Results   Component Value Date    AST 33 12/28/2018           Post Infusion Assessment:  Patient tolerated infusion without incident.  Blood return  noted pre and post infusion.  Site patent and intact, free from redness, edema or discomfort.  No evidence of extravasations. Access discontinued per protocol.    Discharge Plan:   Copy of AVS reviewed with patient and/or family.  Patient will return tomorrow for next appointment.  Patient discharged in stable condition accompanied by: son.  Departure Mode: Ambulatory.  Face to Face time: 5 minutes.    Fernanda Cross RN

## 2018-12-28 NOTE — LETTER
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  CT scan on 10/24/2018 showed a cystic mass involving the left side of the mediastinum extending from the left hilum towards the cardiac apex, abutting the pericardium over a long extent peripherally.  Enhancing soft tissue element as well surrounding atelectatic lingula.  MRA brain 10/31/2018 showed no brain metastases.  PET/CT scan 11/13/2018 and CT-guided biopsy 11/01/2018 consistent with squamous cell carcinoma. She started on neoadjuvant treatment with carboplatin and Taxol on 11/21/18.  Plan is to complete 2-3 cycles followed by a CT scan and a plan for sequential radiation followed by maintenance immune therapy. She was hospitalized from 12/5-12/8/18 for failure to thrive and pneumonia. She had a brief episode of a.fib/flutter that resolved on its own during the hospitalization. She was also found to have a small malignant pericardial effusion. Due to hypoxia, a chest CT PE protocol was performed on 12/5/18, which was negative for DVT, but did show progressive disease with enlargement of the left upper lobe mass. She was hospitalized from 12/16-12/24/18 with an upper GI bleed. 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 She was discharged home on Protonix 40 mg bid and Pepcid 20 mg bid. She comes in today for routine hospital follow up.    Interval History:  Patient reports that she had 3 bowel movements yesterday all of which were soft and tarry.  She is not taking MiraLAX currently.  She has not yet had a bowel movement today.  She reports that since she  started bleeding, she has not yet had a bowel movement but is not tarry.  She reports resolution of her chest pain and is no longer taking any prescription pain medication.  Very rarely is she taking Tylenol.  She denies any fevers he is leaving the hospital.  She does feel cold easily.  She continues to have poor taste which impacts her ability to eat.  She has had some pudding, rice, and Chinese food yesterday.  She denies any change to her dyspnea.  She denies other concerns.    Current Outpatient Medications   Medication Sig Dispense Refill     dexamethasone (DECADRON) 4 MG tablet Take 4 mg by mouth daily. (Patient not taking: Reported on 12/28/2018.) 30 tablet 1     famotidine (PEPCID) 20 MG tablet Take 20 mg by mouth 2 times daily       loratadine (CLARITIN) 10 MG tablet Take 1 tablet (10 mg) by mouth daily Take day of chemotherapy and for 4 days afterwards.       LORazepam (ATIVAN) 0.5 MG tablet Take 1 tablet (0.5 mg) by mouth every 4 hours as needed (Anxiety, Nausea/Vomiting or Sleep) (Patient not taking: Reported on 12/28/2018) 30 tablet 2     metoprolol tartrate (LOPRESSOR) 25 MG tablet Take 0.5 tablets (12.5 mg) by mouth 2 times daily 60 tablet 0     mirtazapine (REMERON) 7.5 MG tablet Take 1 tablet (7.5 mg) by mouth At Bedtime       OLANZapine (ZYPREXA) 2.5 MG tablet Take 1 tablet (2.5 mg) by mouth At Bedtime 30 tablet 1     ondansetron (ZOFRAN) 8 MG tablet Take 0.5 tablets (4 mg) by mouth every 8 hours as needed for nausea       oxyCODONE (OXYCONTIN) 10 MG 12 hr tablet Take 1 tablet (10 mg) by mouth every 12 hours maximum 2 tablet(s) per day (Patient not taking: Reported on 12/28/2018) 60 tablet 0     oxyCODONE-acetaminophen (PERCOCET) 5-325 MG tablet Take 1-2 tablets by mouth every 4 hours as needed for severe pain (max 9/day) (Patient not taking: Reported on 12/28/2018) 120 tablet 0     pantoprazole (PROTONIX) 40 MG EC tablet Take 40 mg by mouth 2 times daily       polyethylene glycol  (MIRALAX/GLYCOLAX) Packet Take 17 g by mouth daily 7 packet 3     senna-docusate (SENOKOT-S/PERICOLACE) 8.6-50 MG tablet Take 2-4 tablets by mouth 2 times daily 60 tablet 1      Physical Exam:  General: The patient is a pleasant female. She appears moderately cachetic. She is here today with one of her sons.   /72   Pulse 80   Temp 98.4  F (36.9  C) (Oral)   Wt 52.4 kg (115 lb 8 oz)   SpO2 97%   BMI 21.13 kg/m     Wt Readings from Last 10 Encounters:   12/28/18 52.4 kg (115 lb 8 oz)   12/12/18 54.3 kg (119 lb 11.2 oz)   12/07/18 60.3 kg (133 lb)   12/05/18 55.4 kg (122 lb 1.6 oz)   11/28/18 56.2 kg (123 lb 14.4 oz)   11/21/18 55.4 kg (122 lb 1.6 oz)   11/20/18 54.9 kg (121 lb)   11/20/18 55.1 kg (121 lb 6.4 oz)   11/13/18 56.5 kg (124 lb 9 oz)   11/07/18 57 kg (125 lb 11.2 oz)   HEENT: EOMI. Sclerae are anicteric.    Lymph: Neck is supple with no lymphadenopathy in the cervical or supraclavicular areas.   Heart: Regular rate and rhythm.   Lungs: Fine crackles in the left lung base, otherwise clear to auscultation bilaterally.   Abdomen: Bowel sounds present, soft, nontender with no palpable masses.   Extremities: No lower extremity edema noted bilaterally.   Neuro: Cranial nerves II through XII are grossly intact.  Skin: No rashes, petechiae, or bruising noted on exposed skin.    Labs:   12/28/2018 13:05   Sodium 138   Potassium 3.7   Chloride 105   Carbon Dioxide 25   Urea Nitrogen 19   Creatinine 0.67   GFR Estimate 85   GFR Estimate If Black >90   Calcium 8.1 (L)   Anion Gap 8   Albumin 2.3 (L)   Protein Total 6.2 (L)   Bilirubin Total 0.2   Alkaline Phosphatase 100   ALT 27   AST 33   Glucose 99   WBC 7.9   Hemoglobin 7.8 (L)   Hematocrit 24.9 (L)   Platelet Count 389   RBC Count 2.71 (L)   MCV 92   MCH 28.8   MCHC 31.3 (L)   RDW 16.9 (H)   Diff Method Automated Method   % Neutrophils 77.4   % Lymphocytes 13.0   % Monocytes 6.4   % Eosinophils 2.4   % Basophils 0.4   % Immature Granulocytes 0.4    Nucleated RBCs 0   Absolute Neutrophil 6.1   Absolute Lymphocytes 1.0   Absolute Monocytes 0.5   Absolute Eosinophils 0.2   Absolute Basophils 0.0   Abs Immature Granulocytes 0.0   Absolute Nucleated RBC 0.0   ABO A   RH(D) Pos   Antibody Screen Pos (A)   Test Valid Only At [facility]...   Specimen Expires 12/31/2018   Blood Bank Comment Delay in availabi...   Antibody Identification [email or web address]     Hgb from care everywhere  12/24 7.9 and 8.2  12/26 9.0    Assessment and Plan:  1. Lung cancer.  Treatment for her cancer is currently on hold due to continued active upper GI bleed. If bleed is able to be controlled, then we will consider proceeding with a CT guided biopsy to assess for PD-L1 status of the tumor. She will follow up with Dr. Solares in 2 weeks for continued care. She will call sooner for concerns.     2. Upper GI bleed. Patient continues to have black, tarry stools. We discussed the option of admission for aggressive management with repeat EGD to see if there is a bleeding area that could be treated versus managing outpatient with blood transfusion tomorrow and close follow up with GI on 12/31 versus observation and consideration of hospice. Patient opts to stay outpatient and receive blood tomorrow. We reviewed and signed the blood consent together today. She will receive 2 units pRBC's tomorrow. I recommend that she have her hemoglobin checked at least twice weekly with possible blood products. She will explore if this may be done through her PCP or GI. She remains on Protonix and Pepcid.    3. Anorexia/dysguesia. She will continue on Remeron for now. She is unsure if it is helpful. Her weight continues to decline.     4. History of a.fib/flutter and hypertension. Currently on metoprolol with no acute concerns today.     Atiya Houston PA-C  [facility]  [address] [place] [address] [phone]

## 2018-12-28 NOTE — NURSING NOTE
.  Chief Complaint   Patient presents with     Lab Only     labs drawn via port by RN in lab, saline and heparin locked, vitals completed

## 2018-12-29 NOTE — PROGRESS NOTES
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  CT scan on 10/24/2018 showed a cystic mass involving the left side of the mediastinum extending from the left hilum towards the cardiac apex, abutting the pericardium over a long extent peripherally.  Enhancing soft tissue element as well surrounding atelectatic lingula.  MRA brain 10/31/2018 showed no brain metastases.  PET/CT scan 11/13/2018 and CT-guided biopsy 11/01/2018 consistent with squamous cell carcinoma. She started on neoadjuvant treatment with carboplatin and Taxol on 11/21/18.  Plan is to complete 2-3 cycles followed by a CT scan and a plan for sequential radiation followed by maintenance immune therapy. She was hospitalized from 12/5-12/8/18 for failure to thrive and pneumonia. She had a brief episode of a.fib/flutter that resolved on its own during the hospitalization. She was also found to have a small malignant pericardial effusion. Due to hypoxia, a chest CT PE protocol was performed on 12/5/18, which was negative for DVT, but did show progressive disease with enlargement of the left upper lobe mass. She was hospitalized from 12/16-12/24/18 with an upper GI bleed. 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 She was discharged home on Protonix 40 mg bid and Pepcid 20 mg bid. She comes in today for routine hospital follow up.    Interval History:  Patient reports that she had 3 bowel movements yesterday all of which were soft and tarry.  She is not taking MiraLAX currently.  She has not yet had a bowel movement today.  She reports that since she started bleeding, she has not yet had a bowel movement but is not tarry.  She reports  resolution of her chest pain and is no longer taking any prescription pain medication.  Very rarely is she taking Tylenol.  She denies any fevers he is leaving the hospital.  She does feel cold easily.  She continues to have poor taste which impacts her ability to eat.  She has had some pudding, rice, and Chinese food yesterday.  She denies any change to her dyspnea.  She denies other concerns.    Current Outpatient Medications   Medication Sig Dispense Refill     dexamethasone (DECADRON) 4 MG tablet Take 4 mg by mouth daily. (Patient not taking: Reported on 12/28/2018.) 30 tablet 1     famotidine (PEPCID) 20 MG tablet Take 20 mg by mouth 2 times daily       loratadine (CLARITIN) 10 MG tablet Take 1 tablet (10 mg) by mouth daily Take day of chemotherapy and for 4 days afterwards.       LORazepam (ATIVAN) 0.5 MG tablet Take 1 tablet (0.5 mg) by mouth every 4 hours as needed (Anxiety, Nausea/Vomiting or Sleep) (Patient not taking: Reported on 12/28/2018) 30 tablet 2     metoprolol tartrate (LOPRESSOR) 25 MG tablet Take 0.5 tablets (12.5 mg) by mouth 2 times daily 60 tablet 0     mirtazapine (REMERON) 7.5 MG tablet Take 1 tablet (7.5 mg) by mouth At Bedtime       OLANZapine (ZYPREXA) 2.5 MG tablet Take 1 tablet (2.5 mg) by mouth At Bedtime 30 tablet 1     ondansetron (ZOFRAN) 8 MG tablet Take 0.5 tablets (4 mg) by mouth every 8 hours as needed for nausea       oxyCODONE (OXYCONTIN) 10 MG 12 hr tablet Take 1 tablet (10 mg) by mouth every 12 hours maximum 2 tablet(s) per day (Patient not taking: Reported on 12/28/2018) 60 tablet 0     oxyCODONE-acetaminophen (PERCOCET) 5-325 MG tablet Take 1-2 tablets by mouth every 4 hours as needed for severe pain (max 9/day) (Patient not taking: Reported on 12/28/2018) 120 tablet 0     pantoprazole (PROTONIX) 40 MG EC tablet Take 40 mg by mouth 2 times daily       polyethylene glycol (MIRALAX/GLYCOLAX) Packet Take 17 g by mouth daily 7 packet 3     senna-docusate  (SENOKOT-S/PERICOLACE) 8.6-50 MG tablet Take 2-4 tablets by mouth 2 times daily 60 tablet 1      Physical Exam:  General: The patient is a pleasant female. She appears moderately cachetic. She is here today with one of her sons.   /72   Pulse 80   Temp 98.4  F (36.9  C) (Oral)   Wt 52.4 kg (115 lb 8 oz)   SpO2 97%   BMI 21.13 kg/m    Wt Readings from Last 10 Encounters:   12/28/18 52.4 kg (115 lb 8 oz)   12/12/18 54.3 kg (119 lb 11.2 oz)   12/07/18 60.3 kg (133 lb)   12/05/18 55.4 kg (122 lb 1.6 oz)   11/28/18 56.2 kg (123 lb 14.4 oz)   11/21/18 55.4 kg (122 lb 1.6 oz)   11/20/18 54.9 kg (121 lb)   11/20/18 55.1 kg (121 lb 6.4 oz)   11/13/18 56.5 kg (124 lb 9 oz)   11/07/18 57 kg (125 lb 11.2 oz)   HEENT: EOMI. Sclerae are anicteric.    Lymph: Neck is supple with no lymphadenopathy in the cervical or supraclavicular areas.   Heart: Regular rate and rhythm.   Lungs: Fine crackles in the left lung base, otherwise clear to auscultation bilaterally.   Abdomen: Bowel sounds present, soft, nontender with no palpable masses.   Extremities: No lower extremity edema noted bilaterally.   Neuro: Cranial nerves II through XII are grossly intact.  Skin: No rashes, petechiae, or bruising noted on exposed skin.    Labs:   12/28/2018 13:05   Sodium 138   Potassium 3.7   Chloride 105   Carbon Dioxide 25   Urea Nitrogen 19   Creatinine 0.67   GFR Estimate 85   GFR Estimate If Black >90   Calcium 8.1 (L)   Anion Gap 8   Albumin 2.3 (L)   Protein Total 6.2 (L)   Bilirubin Total 0.2   Alkaline Phosphatase 100   ALT 27   AST 33   Glucose 99   WBC 7.9   Hemoglobin 7.8 (L)   Hematocrit 24.9 (L)   Platelet Count 389   RBC Count 2.71 (L)   MCV 92   MCH 28.8   MCHC 31.3 (L)   RDW 16.9 (H)   Diff Method Automated Method   % Neutrophils 77.4   % Lymphocytes 13.0   % Monocytes 6.4   % Eosinophils 2.4   % Basophils 0.4   % Immature Granulocytes 0.4   Nucleated RBCs 0   Absolute Neutrophil 6.1   Absolute Lymphocytes 1.0   Absolute  Monocytes 0.5   Absolute Eosinophils 0.2   Absolute Basophils 0.0   Abs Immature Granulocytes 0.0   Absolute Nucleated RBC 0.0   ABO A   RH(D) Pos   Antibody Screen Pos (A)   Test Valid Only At [facility]...   Specimen Expires 12/31/2018   Blood Bank Comment Delay in availabi...   Antibody Identification ANTI-Catie     Hgb from care everywhere  12/24 7.9 and 8.2  12/26 9.0    Assessment and Plan:  1. Lung cancer.  Treatment for her cancer is currently on hold due to continued active upper GI bleed. If bleed is able to be controlled, then we will consider proceeding with a CT guided biopsy to assess for PD-L1 status of the tumor. She will follow up with Dr. Solares in 2 weeks for continued care. She will call sooner for concerns.     2. Upper GI bleed. Patient continues to have black, tarry stools. We discussed the option of admission for aggressive management with repeat EGD to see if there is a bleeding area that could be treated versus managing outpatient with blood transfusion tomorrow and close follow up with GI on 12/31 versus observation and consideration of hospice. Patient opts to stay outpatient and receive blood tomorrow. We reviewed and signed the blood consent together today. She will receive 2 units pRBC's tomorrow. I recommend that she have her hemoglobin checked at least twice weekly with possible blood products. She will explore if this may be done through her PCP or GI. She remains on Protonix and Pepcid.    3. Anorexia/dysguesia. She will continue on Remeron for now. She is unsure if it is helpful. Her weight continues to decline.     4. History of a.fib/flutter and hypertension. Currently on metoprolol with no acute concerns today.     Atiya Houston PA-C  [facility]  [address] [place] [address] [phone]

## 2018-12-29 NOTE — PROGRESS NOTES
Infusion Nursing Note:  Barbie Deluca presents today for 2 units PRBC's.    Patient seen by provider today: No    Note: Patient denies any new concerns overnight since meeting with BLAS Cortez yesterday. Denies any pain today and states she had one BM overnight that was NOT dark and tarry.    Intravenous Access:  Implanted Port.    Treatment Conditions:  Lab Results   Component Value Date    HGB 7.8 12/28/2018     Lab Results   Component Value Date    WBC 7.9 12/28/2018      Lab Results   Component Value Date    ANEU 6.1 12/28/2018     Lab Results   Component Value Date     12/28/2018      Results reviewed, labs MET treatment parameters, ok to proceed with treatment.  Blood transfusion consent signed yesterday 12/28/18.    Post Infusion Assessment:  Patient tolerated infusion without incident.  Blood return noted pre and post infusion.  Site patent and intact, free from redness, edema or discomfort.  No evidence of extravasations. Access discontinued per protocol.    Discharge Plan:   Patient declined prescription refills.  Copy of AVS reviewed with patient and/or family.  Patient will return 1/1 for next appointment.  Patient discharged in stable condition accompanied by: son.  Departure Mode: Ambulatory.    Fernanda Cross RN

## 2019-01-01 ENCOUNTER — HOME CARE/HOSPICE - HEALTHEAST (OUTPATIENT)
Dept: HOSPICE | Facility: HOSPICE | Age: 79
End: 2019-01-01

## 2019-01-01 ENCOUNTER — HOME CARE/HOSPICE - HEALTHEAST (OUTPATIENT)
Dept: HOME HEALTH SERVICES | Facility: HOME HEALTH | Age: 79
End: 2019-01-01

## 2019-01-01 ENCOUNTER — TELEPHONE (OUTPATIENT)
Dept: ONCOLOGY | Facility: CLINIC | Age: 79
End: 2019-01-01

## 2019-01-01 ENCOUNTER — CARE COORDINATION (OUTPATIENT)
Dept: ONCOLOGY | Facility: CLINIC | Age: 79
End: 2019-01-01

## 2019-01-01 ENCOUNTER — OFFICE VISIT - HEALTHEAST (OUTPATIENT)
Dept: FAMILY MEDICINE | Facility: CLINIC | Age: 79
End: 2019-01-01

## 2019-01-01 ENCOUNTER — COMMUNICATION - HEALTHEAST (OUTPATIENT)
Dept: CARE COORDINATION | Facility: CLINIC | Age: 79
End: 2019-01-01

## 2019-01-01 ENCOUNTER — AMBULATORY - HEALTHEAST (OUTPATIENT)
Dept: MULTI SPECIALTY CLINIC | Facility: CLINIC | Age: 79
End: 2019-01-01

## 2019-01-01 ENCOUNTER — AMBULATORY - HEALTHEAST (OUTPATIENT)
Dept: HOSPICE | Facility: HOSPICE | Age: 79
End: 2019-01-01

## 2019-01-01 ENCOUNTER — COMMUNICATION - HEALTHEAST (OUTPATIENT)
Dept: FAMILY MEDICINE | Facility: CLINIC | Age: 79
End: 2019-01-01

## 2019-01-01 ENCOUNTER — RECORDS - HEALTHEAST (OUTPATIENT)
Dept: ADMINISTRATIVE | Facility: OTHER | Age: 79
End: 2019-01-01

## 2019-01-01 ENCOUNTER — AMBULATORY - HEALTHEAST (OUTPATIENT)
Dept: FAMILY MEDICINE | Facility: CLINIC | Age: 79
End: 2019-01-01

## 2019-01-01 ENCOUNTER — COMMUNICATION - HEALTHEAST (OUTPATIENT)
Dept: HOME HEALTH SERVICES | Facility: HOME HEALTH | Age: 79
End: 2019-01-01

## 2019-01-01 ENCOUNTER — INFUSION THERAPY VISIT (OUTPATIENT)
Dept: ONCOLOGY | Facility: CLINIC | Age: 79
End: 2019-01-01
Attending: INTERNAL MEDICINE
Payer: COMMERCIAL

## 2019-01-01 ENCOUNTER — HOSPITAL ENCOUNTER (OUTPATIENT)
Facility: CLINIC | Age: 79
Discharge: HOME | End: 2019-01-21
Attending: RADIOLOGY | Admitting: RADIOLOGY
Payer: COMMERCIAL

## 2019-01-01 ENCOUNTER — APPOINTMENT (OUTPATIENT)
Dept: LAB | Facility: CLINIC | Age: 79
End: 2019-01-01
Attending: INTERNAL MEDICINE
Payer: COMMERCIAL

## 2019-01-01 ENCOUNTER — ANCILLARY PROCEDURE (OUTPATIENT)
Dept: ULTRASOUND IMAGING | Facility: CLINIC | Age: 79
End: 2019-01-01
Payer: COMMERCIAL

## 2019-01-01 ENCOUNTER — COMMUNICATION - HEALTHEAST (OUTPATIENT)
Dept: MULTI SPECIALTY CLINIC | Facility: CLINIC | Age: 79
End: 2019-01-01

## 2019-01-01 ENCOUNTER — DOCUMENTATION ONLY (OUTPATIENT)
Dept: OTHER | Facility: CLINIC | Age: 79
End: 2019-01-01

## 2019-01-01 ENCOUNTER — HOSPITAL ENCOUNTER (OUTPATIENT)
Dept: CT IMAGING | Facility: CLINIC | Age: 79
End: 2019-01-21
Attending: RADIOLOGY | Admitting: RADIOLOGY
Payer: COMMERCIAL

## 2019-01-01 ENCOUNTER — AMBULATORY - HEALTHEAST (OUTPATIENT)
Dept: PALLIATIVE MEDICINE | Facility: OTHER | Age: 79
End: 2019-01-01

## 2019-01-01 ENCOUNTER — AMBULATORY - HEALTHEAST (OUTPATIENT)
Dept: LAB | Facility: CLINIC | Age: 79
End: 2019-01-01

## 2019-01-01 ENCOUNTER — AMBULATORY - HEALTHEAST (OUTPATIENT)
Dept: OTHER | Facility: CLINIC | Age: 79
End: 2019-01-01

## 2019-01-01 ENCOUNTER — COMMUNICATION - HEALTHEAST (OUTPATIENT)
Dept: SCHEDULING | Facility: CLINIC | Age: 79
End: 2019-01-01

## 2019-01-01 ENCOUNTER — AMBULATORY - HEALTHEAST (OUTPATIENT)
Dept: NURSING | Facility: CLINIC | Age: 79
End: 2019-01-01

## 2019-01-01 ENCOUNTER — ANCILLARY PROCEDURE (OUTPATIENT)
Dept: GENERAL RADIOLOGY | Facility: CLINIC | Age: 79
End: 2019-01-01
Payer: COMMERCIAL

## 2019-01-01 ENCOUNTER — ANCILLARY PROCEDURE (OUTPATIENT)
Dept: CARDIOLOGY | Facility: CLINIC | Age: 79
End: 2019-01-01
Payer: COMMERCIAL

## 2019-01-01 ENCOUNTER — TRANSFERRED RECORDS (OUTPATIENT)
Dept: HEALTH INFORMATION MANAGEMENT | Facility: CLINIC | Age: 79
End: 2019-01-01

## 2019-01-01 ENCOUNTER — INFUSION THERAPY VISIT (OUTPATIENT)
Dept: INFUSION THERAPY | Facility: CLINIC | Age: 79
End: 2019-01-01
Attending: INTERNAL MEDICINE
Payer: COMMERCIAL

## 2019-01-01 ENCOUNTER — HOSPITAL ENCOUNTER (OUTPATIENT)
Facility: CLINIC | Age: 79
Setting detail: SPECIMEN
Discharge: HOME | End: 2019-01-16
Attending: INTERNAL MEDICINE | Admitting: PHYSICIAN ASSISTANT
Payer: COMMERCIAL

## 2019-01-01 ENCOUNTER — TELEPHONE (OUTPATIENT)
Dept: SPIRITUAL SERVICES | Facility: CLINIC | Age: 79
End: 2019-01-01

## 2019-01-01 ENCOUNTER — OFFICE VISIT - HEALTHEAST (OUTPATIENT)
Dept: RADIATION ONCOLOGY | Facility: HOSPITAL | Age: 79
End: 2019-01-01

## 2019-01-01 VITALS
BODY MASS INDEX: 20.06 KG/M2 | OXYGEN SATURATION: 92 % | SYSTOLIC BLOOD PRESSURE: 138 MMHG | HEART RATE: 66 BPM | DIASTOLIC BLOOD PRESSURE: 86 MMHG | TEMPERATURE: 98 F | WEIGHT: 109.7 LBS

## 2019-01-01 VITALS
TEMPERATURE: 98.9 F | HEART RATE: 64 BPM | OXYGEN SATURATION: 92 % | RESPIRATION RATE: 16 BRPM | DIASTOLIC BLOOD PRESSURE: 85 MMHG | SYSTOLIC BLOOD PRESSURE: 148 MMHG | WEIGHT: 117.8 LBS | BODY MASS INDEX: 21.55 KG/M2

## 2019-01-01 VITALS
RESPIRATION RATE: 16 BRPM | OXYGEN SATURATION: 88 % | HEART RATE: 64 BPM | SYSTOLIC BLOOD PRESSURE: 148 MMHG | DIASTOLIC BLOOD PRESSURE: 85 MMHG | TEMPERATURE: 98.9 F

## 2019-01-01 VITALS
TEMPERATURE: 97.7 F | SYSTOLIC BLOOD PRESSURE: 164 MMHG | OXYGEN SATURATION: 98 % | HEART RATE: 63 BPM | DIASTOLIC BLOOD PRESSURE: 98 MMHG | RESPIRATION RATE: 16 BRPM | BODY MASS INDEX: 22.57 KG/M2 | WEIGHT: 123.4 LBS

## 2019-01-01 VITALS
OXYGEN SATURATION: 94 % | RESPIRATION RATE: 16 BRPM | HEART RATE: 81 BPM | BODY MASS INDEX: 22.72 KG/M2 | DIASTOLIC BLOOD PRESSURE: 84 MMHG | TEMPERATURE: 98.9 F | WEIGHT: 124.2 LBS | SYSTOLIC BLOOD PRESSURE: 138 MMHG

## 2019-01-01 DIAGNOSIS — R07.9 ACUTE CHEST PAIN: ICD-10-CM

## 2019-01-01 DIAGNOSIS — K59.09 OTHER CONSTIPATION: ICD-10-CM

## 2019-01-01 DIAGNOSIS — R20.0 NUMBNESS AND TINGLING IN LEFT ARM: ICD-10-CM

## 2019-01-01 DIAGNOSIS — D62 ANEMIA DUE TO BLOOD LOSS, ACUTE: ICD-10-CM

## 2019-01-01 DIAGNOSIS — R20.2 NUMBNESS AND TINGLING IN LEFT ARM: ICD-10-CM

## 2019-01-01 DIAGNOSIS — R20.2 NUMBNESS AND TINGLING OF LEFT LEG: ICD-10-CM

## 2019-01-01 DIAGNOSIS — Z23 NEED FOR SHINGLES VACCINE: ICD-10-CM

## 2019-01-01 DIAGNOSIS — C34.92 NON-SMALL CELL CANCER OF LEFT LUNG (H): Primary | ICD-10-CM

## 2019-01-01 DIAGNOSIS — R20.0 NUMBNESS AND TINGLING OF LEFT LEG: ICD-10-CM

## 2019-01-01 DIAGNOSIS — H54.3 IMPAIRED VISION IN BOTH EYES: ICD-10-CM

## 2019-01-01 DIAGNOSIS — C34.92 SQUAMOUS CELL CARCINOMA OF LEFT LUNG (H): ICD-10-CM

## 2019-01-01 DIAGNOSIS — K92.2 GI BLEEDING: ICD-10-CM

## 2019-01-01 DIAGNOSIS — Z51.5 HOSPICE CARE: ICD-10-CM

## 2019-01-01 DIAGNOSIS — C34.92 NON-SMALL CELL CARCINOMA OF LEFT LUNG, STAGE 4 (H): ICD-10-CM

## 2019-01-01 DIAGNOSIS — C34.92 NON-SMALL CELL LUNG CANCER, LEFT (H): ICD-10-CM

## 2019-01-01 DIAGNOSIS — I10 ESSENTIAL HYPERTENSION: ICD-10-CM

## 2019-01-01 DIAGNOSIS — K21.9 GASTROESOPHAGEAL REFLUX DISEASE WITHOUT ESOPHAGITIS: ICD-10-CM

## 2019-01-01 DIAGNOSIS — R07.89 CHEST WALL PAIN: ICD-10-CM

## 2019-01-01 DIAGNOSIS — R09.02 HYPOXEMIA: ICD-10-CM

## 2019-01-01 DIAGNOSIS — M79.89 LEG SWELLING: Primary | ICD-10-CM

## 2019-01-01 DIAGNOSIS — I10 BENIGN ESSENTIAL HYPERTENSION: ICD-10-CM

## 2019-01-01 DIAGNOSIS — D62 ACUTE BLOOD LOSS ANEMIA: ICD-10-CM

## 2019-01-01 DIAGNOSIS — C34.92 NON-SMALL CELL CANCER OF LEFT LUNG (H): ICD-10-CM

## 2019-01-01 DIAGNOSIS — K59.03 DRUG-INDUCED CONSTIPATION: ICD-10-CM

## 2019-01-01 DIAGNOSIS — I31.39 PERICARDIAL EFFUSION: ICD-10-CM

## 2019-01-01 DIAGNOSIS — R06.09 DYSPNEA ON EXERTION: ICD-10-CM

## 2019-01-01 DIAGNOSIS — M79.89 LEG SWELLING: ICD-10-CM

## 2019-01-01 DIAGNOSIS — I48.0 PAROXYSMAL ATRIAL FIBRILLATION (H): ICD-10-CM

## 2019-01-01 DIAGNOSIS — G58.8 INTERCOSTAL NEURITIS: ICD-10-CM

## 2019-01-01 DIAGNOSIS — Z86.73 HISTORY OF CVA (CEREBROVASCULAR ACCIDENT): ICD-10-CM

## 2019-01-01 DIAGNOSIS — E83.42 HYPOMAGNESEMIA: ICD-10-CM

## 2019-01-01 DIAGNOSIS — I10 ESSENTIAL HYPERTENSION, BENIGN: ICD-10-CM

## 2019-01-01 DIAGNOSIS — R10.13 DYSPEPSIA: ICD-10-CM

## 2019-01-01 DIAGNOSIS — R20.0 NUMBNESS AND TINGLING OF LEFT SIDE OF FACE: ICD-10-CM

## 2019-01-01 DIAGNOSIS — R63.4 WEIGHT LOSS: ICD-10-CM

## 2019-01-01 DIAGNOSIS — K28.4 BLEEDING ULCER: ICD-10-CM

## 2019-01-01 DIAGNOSIS — J96.01 ACUTE RESPIRATORY FAILURE WITH HYPOXEMIA (H): ICD-10-CM

## 2019-01-01 DIAGNOSIS — R13.10 DYSPHAGIA, UNSPECIFIED TYPE: ICD-10-CM

## 2019-01-01 DIAGNOSIS — R20.2 NUMBNESS AND TINGLING OF LEFT SIDE OF FACE: ICD-10-CM

## 2019-01-01 DIAGNOSIS — M25.512 PAIN IN SHOULDER REGION, LEFT: ICD-10-CM

## 2019-01-01 DIAGNOSIS — K59.00 CONSTIPATION, UNSPECIFIED CONSTIPATION TYPE: ICD-10-CM

## 2019-01-01 DIAGNOSIS — G89.3 CANCER ASSOCIATED PAIN: Primary | ICD-10-CM

## 2019-01-01 DIAGNOSIS — E87.6 HYPOKALEMIA: ICD-10-CM

## 2019-01-01 DIAGNOSIS — C34.12 MALIGNANT NEOPLASM OF UPPER LOBE OF LEFT LUNG (H): ICD-10-CM

## 2019-01-01 DIAGNOSIS — M62.81 GENERALIZED MUSCLE WEAKNESS: ICD-10-CM

## 2019-01-01 DIAGNOSIS — R29.898 WEAKNESS OF LEFT LEG: ICD-10-CM

## 2019-01-01 DIAGNOSIS — R29.898 LEFT ARM WEAKNESS: ICD-10-CM

## 2019-01-01 LAB
ABO + RH BLD: ABNORMAL
ALBUMIN SERPL-MCNC: 2.7 G/DL (ref 3.4–5)
ALBUMIN SERPL-MCNC: 2.7 G/DL (ref 3.4–5)
ALP SERPL-CCNC: 143 U/L (ref 40–150)
ALT SERPL W P-5'-P-CCNC: 19 U/L (ref 0–50)
ANION GAP SERPL CALCULATED.3IONS-SCNC: 11 MMOL/L
ANION GAP SERPL CALCULATED.3IONS-SCNC: 11 MMOL/L (ref 5–18)
ANION GAP SERPL CALCULATED.3IONS-SCNC: 7 MMOL/L (ref 3–14)
ANION GAP SERPL CALCULATED.3IONS-SCNC: 9 MMOL/L (ref 3–14)
AST SERPL W P-5'-P-CCNC: 16 U/L (ref 0–45)
BASOPHILS # BLD AUTO: 0 10E9/L (ref 0–0.2)
BASOPHILS # BLD AUTO: 0 10E9/L (ref 0–0.2)
BASOPHILS # BLD AUTO: 0.1 10E9/L (ref 0–0.2)
BASOPHILS NFR BLD AUTO: 0.2 %
BASOPHILS NFR BLD AUTO: 0.5 %
BASOPHILS NFR BLD AUTO: 0.6 %
BILIRUB SERPL-MCNC: 0.6 MG/DL (ref 0.2–1.3)
BLD GP AB SCN SERPL QL: ABNORMAL
BLD GP AB SCN SERPL QL: ABNORMAL
BLOOD BANK CMNT PATIENT-IMP: ABNORMAL
BUN SERPL-MCNC: 11 MG/DL (ref 7–30)
BUN SERPL-MCNC: 14 MG/DL
BUN SERPL-MCNC: 14 MG/DL (ref 8–28)
BUN SERPL-MCNC: 15 MG/DL (ref 7–30)
CALCIUM SERPL-MCNC: 8.8 MG/DL (ref 8.5–10.1)
CALCIUM SERPL-MCNC: 8.8 MG/DL (ref 8.5–10.1)
CALCIUM SERPL-MCNC: 9.3 MG/DL
CALCIUM SERPL-MCNC: 9.3 MG/DL (ref 8.5–10.5)
CHLORIDE BLD-SCNC: 97 MMOL/L (ref 98–107)
CHLORIDE SERPL-SCNC: 100 MMOL/L (ref 94–109)
CHLORIDE SERPL-SCNC: 92 MMOL/L (ref 94–109)
CHLORIDE SERPLBLD-SCNC: 97 MMOL/L (ref 98–107)
CO2 SERPL-SCNC: 26 MMOL/L (ref 20–32)
CO2 SERPL-SCNC: 27 MMOL/L
CO2 SERPL-SCNC: 27 MMOL/L (ref 22–31)
CO2 SERPL-SCNC: 34 MMOL/L (ref 20–32)
CREAT SERPL-MCNC: 0.6 MG/DL (ref 0.52–1.04)
CREAT SERPL-MCNC: 0.71 MG/DL (ref 0.52–1.04)
CREAT SERPL-MCNC: 0.77 MG/DL
CREAT SERPL-MCNC: 0.77 MG/DL (ref 0.6–1.1)
DIFFERENTIAL METHOD BLD: ABNORMAL
EOSINOPHIL # BLD AUTO: 0.1 10E9/L (ref 0–0.7)
EOSINOPHIL # BLD AUTO: 0.1 10E9/L (ref 0–0.7)
EOSINOPHIL # BLD AUTO: 0.3 10E9/L (ref 0–0.7)
EOSINOPHIL NFR BLD AUTO: 1 %
EOSINOPHIL NFR BLD AUTO: 1.1 %
EOSINOPHIL NFR BLD AUTO: 4.1 %
ERYTHROCYTE [DISTWIDTH] IN BLOOD BY AUTOMATED COUNT: 16.3 % (ref 10–15)
ERYTHROCYTE [DISTWIDTH] IN BLOOD BY AUTOMATED COUNT: 16.5 % (ref 10–15)
ERYTHROCYTE [DISTWIDTH] IN BLOOD BY AUTOMATED COUNT: 16.9 % (ref 10–15)
GFR SERPL CREATININE-BSD FRML MDRD: 82 ML/MIN/{1.73_M2}
GFR SERPL CREATININE-BSD FRML MDRD: 88 ML/MIN/{1.73_M2}
GFR SERPL CREATININE-BSD FRML MDRD: >60 ML/MIN/1.73M2
GFR SERPL CREATININE-BSD FRML MDRD: >60 ML/MIN/1.73M2
GLUCOSE BLD-MCNC: 226 MG/DL (ref 70–125)
GLUCOSE SERPL-MCNC: 134 MG/DL (ref 70–99)
GLUCOSE SERPL-MCNC: 226 MG/DL (ref 70–125)
GLUCOSE SERPL-MCNC: 96 MG/DL (ref 70–99)
HCT VFR BLD AUTO: 32 % (ref 35–47)
HCT VFR BLD AUTO: 32.7 % (ref 35–47)
HCT VFR BLD AUTO: 32.7 % (ref 35–47)
HGB BLD-MCNC: 10.3 G/DL (ref 11.7–15.7)
HGB BLD-MCNC: 10.3 G/DL (ref 11.7–15.7)
HGB BLD-MCNC: 9.8 G/DL (ref 11.7–15.7)
IMM GRANULOCYTES # BLD: 0 10E9/L (ref 0–0.4)
IMM GRANULOCYTES NFR BLD: 0.3 %
IMM GRANULOCYTES NFR BLD: 0.4 %
IMM GRANULOCYTES NFR BLD: 0.4 %
LYMPHOCYTES # BLD AUTO: 0.8 10E9/L (ref 0.8–5.3)
LYMPHOCYTES # BLD AUTO: 1.1 10E9/L (ref 0.8–5.3)
LYMPHOCYTES # BLD AUTO: 1.1 10E9/L (ref 0.8–5.3)
LYMPHOCYTES NFR BLD AUTO: 12.6 %
LYMPHOCYTES NFR BLD AUTO: 15.2 %
LYMPHOCYTES NFR BLD AUTO: 9.9 %
MCH RBC QN AUTO: 28.6 PG (ref 26.5–33)
MCH RBC QN AUTO: 28.8 PG (ref 26.5–33)
MCH RBC QN AUTO: 28.9 PG (ref 26.5–33)
MCHC RBC AUTO-ENTMCNC: 30.6 G/DL (ref 31.5–36.5)
MCHC RBC AUTO-ENTMCNC: 31.5 G/DL (ref 31.5–36.5)
MCHC RBC AUTO-ENTMCNC: 31.5 G/DL (ref 31.5–36.5)
MCV RBC AUTO: 91 FL (ref 78–100)
MCV RBC AUTO: 92 FL (ref 78–100)
MCV RBC AUTO: 93 FL (ref 78–100)
MONOCYTES # BLD AUTO: 0.6 10E9/L (ref 0–1.3)
MONOCYTES # BLD AUTO: 0.8 10E9/L (ref 0–1.3)
MONOCYTES # BLD AUTO: 0.9 10E9/L (ref 0–1.3)
MONOCYTES NFR BLD AUTO: 10 %
MONOCYTES NFR BLD AUTO: 10.2 %
MONOCYTES NFR BLD AUTO: 6.6 %
NEUTROPHILS # BLD AUTO: 5.1 10E9/L (ref 1.6–8.3)
NEUTROPHILS # BLD AUTO: 6.8 10E9/L (ref 1.6–8.3)
NEUTROPHILS # BLD AUTO: 6.8 10E9/L (ref 1.6–8.3)
NEUTROPHILS NFR BLD AUTO: 69.6 %
NEUTROPHILS NFR BLD AUTO: 75.9 %
NEUTROPHILS NFR BLD AUTO: 81.4 %
NRBC # BLD AUTO: 0 10*3/UL
NRBC BLD AUTO-RTO: 0 /100
NT-PROBNP SERPL-MCNC: 4302 PG/ML (ref 0–450)
PHOSPHATE SERPL-MCNC: 4.2 MG/DL (ref 2.5–4.5)
PLATELET # BLD AUTO: 450 10E9/L (ref 150–450)
PLATELET # BLD AUTO: 472 10E9/L (ref 150–450)
PLATELET # BLD AUTO: 510 10E9/L (ref 150–450)
POTASSIUM BLD-SCNC: 3.8 MMOL/L (ref 3.5–5)
POTASSIUM BLD-SCNC: 4.3 MMOL/L (ref 3.5–5)
POTASSIUM SERPL-SCNC: 3.2 MMOL/L (ref 3.4–5.3)
POTASSIUM SERPL-SCNC: 4.1 MMOL/L (ref 3.4–5.3)
POTASSIUM SERPL-SCNC: 4.3 MMOL/L
PROT SERPL-MCNC: 6.7 G/DL (ref 6.8–8.8)
PROT SERPL-MCNC: 6.9 G/DL (ref 6.8–8.8)
RBC # BLD AUTO: 3.43 10E12/L (ref 3.8–5.2)
RBC # BLD AUTO: 3.57 10E12/L (ref 3.8–5.2)
RBC # BLD AUTO: 3.58 10E12/L (ref 3.8–5.2)
SODIUM SERPL-SCNC: 133 MMOL/L (ref 133–144)
SODIUM SERPL-SCNC: 135 MMOL/L (ref 133–144)
SODIUM SERPL-SCNC: 135 MMOL/L (ref 136–145)
SODIUM SERPL-SCNC: 135 MMOL/L (ref 136–145)
SPECIMEN EXP DATE BLD: ABNORMAL
SPECIMEN EXP DATE BLD: ABNORMAL
TROPONIN I SERPL-MCNC: <0.015 UG/L (ref 0–0.04)
WBC # BLD AUTO: 7.4 10E9/L (ref 4–11)
WBC # BLD AUTO: 8.4 10E9/L (ref 4–11)
WBC # BLD AUTO: 9 10E9/L (ref 4–11)

## 2019-01-01 PROCEDURE — 82040 ASSAY OF SERUM ALBUMIN: CPT | Performed by: PHYSICIAN ASSISTANT

## 2019-01-01 PROCEDURE — 86850 RBC ANTIBODY SCREEN: CPT | Performed by: PHYSICIAN ASSISTANT

## 2019-01-01 PROCEDURE — 99215 OFFICE O/P EST HI 40 MIN: CPT | Mod: ZP | Performed by: PHYSICIAN ASSISTANT

## 2019-01-01 PROCEDURE — 36591 DRAW BLOOD OFF VENOUS DEVICE: CPT

## 2019-01-01 PROCEDURE — 93005 ELECTROCARDIOGRAM TRACING: CPT

## 2019-01-01 PROCEDURE — 85025 COMPLETE CBC W/AUTO DIFF WBC: CPT | Performed by: PHYSICIAN ASSISTANT

## 2019-01-01 PROCEDURE — 86900 BLOOD TYPING SEROLOGIC ABO: CPT | Performed by: PHYSICIAN ASSISTANT

## 2019-01-01 PROCEDURE — 80069 RENAL FUNCTION PANEL: CPT | Performed by: PHYSICIAN ASSISTANT

## 2019-01-01 PROCEDURE — 74177 CT ABD & PELVIS W/CONTRAST: CPT

## 2019-01-01 PROCEDURE — 25000128 H RX IP 250 OP 636: Performed by: RADIOLOGY

## 2019-01-01 PROCEDURE — 71260 CT THORAX DX C+: CPT

## 2019-01-01 PROCEDURE — 25000125 ZZHC RX 250: Performed by: RADIOLOGY

## 2019-01-01 PROCEDURE — 83880 ASSAY OF NATRIURETIC PEPTIDE: CPT | Performed by: PHYSICIAN ASSISTANT

## 2019-01-01 PROCEDURE — 25000128 H RX IP 250 OP 636: Performed by: INTERNAL MEDICINE

## 2019-01-01 PROCEDURE — 25000128 H RX IP 250 OP 636: Mod: ZF | Performed by: INTERNAL MEDICINE

## 2019-01-01 PROCEDURE — 84155 ASSAY OF PROTEIN SERUM: CPT | Performed by: PHYSICIAN ASSISTANT

## 2019-01-01 PROCEDURE — 99215 OFFICE O/P EST HI 40 MIN: CPT | Performed by: INTERNAL MEDICINE

## 2019-01-01 PROCEDURE — 84484 ASSAY OF TROPONIN QUANT: CPT | Performed by: PHYSICIAN ASSISTANT

## 2019-01-01 PROCEDURE — G0463 HOSPITAL OUTPT CLINIC VISIT: HCPCS | Mod: ZF

## 2019-01-01 PROCEDURE — 80053 COMPREHEN METABOLIC PANEL: CPT | Performed by: PHYSICIAN ASSISTANT

## 2019-01-01 PROCEDURE — 86901 BLOOD TYPING SEROLOGIC RH(D): CPT | Performed by: PHYSICIAN ASSISTANT

## 2019-01-01 PROCEDURE — G0463 HOSPITAL OUTPT CLINIC VISIT: HCPCS

## 2019-01-01 PROCEDURE — G0463 HOSPITAL OUTPT CLINIC VISIT: HCPCS | Mod: 25

## 2019-01-01 PROCEDURE — 99215 OFFICE O/P EST HI 40 MIN: CPT | Mod: ZP | Performed by: INTERNAL MEDICINE

## 2019-01-01 PROCEDURE — 93010 ELECTROCARDIOGRAM REPORT: CPT | Performed by: INTERNAL MEDICINE

## 2019-01-01 RX ORDER — HEPARIN SODIUM (PORCINE) LOCK FLUSH IV SOLN 100 UNIT/ML 100 UNIT/ML
5 SOLUTION INTRAVENOUS DAILY PRN
Status: DISCONTINUED | OUTPATIENT
Start: 2019-01-01 | End: 2019-01-01 | Stop reason: HOSPADM

## 2019-01-01 RX ORDER — HEPARIN SODIUM (PORCINE) LOCK FLUSH IV SOLN 100 UNIT/ML 100 UNIT/ML
5 SOLUTION INTRAVENOUS
Status: DISCONTINUED | OUTPATIENT
Start: 2019-01-01 | End: 2019-01-01 | Stop reason: HOSPADM

## 2019-01-01 RX ORDER — LIDOCAINE 40 MG/G
CREAM TOPICAL
Status: DISCONTINUED | OUTPATIENT
Start: 2019-01-01 | End: 2019-01-01 | Stop reason: HOSPADM

## 2019-01-01 RX ORDER — BIOTIN 10 MG
2 TABLET ORAL
COMMUNITY

## 2019-01-01 RX ORDER — SIMETHICONE 80 MG
80 TABLET,CHEWABLE ORAL
COMMUNITY
Start: 2019-01-01

## 2019-01-01 RX ORDER — ASPIRIN 81 MG/1
81 TABLET ORAL
COMMUNITY
Start: 2019-01-01

## 2019-01-01 RX ORDER — HEPARIN SODIUM (PORCINE) LOCK FLUSH IV SOLN 100 UNIT/ML 100 UNIT/ML
500 SOLUTION INTRAVENOUS ONCE
Status: COMPLETED | OUTPATIENT
Start: 2019-01-01 | End: 2019-01-01

## 2019-01-01 RX ORDER — OXYCODONE HCL 10 MG/1
10 TABLET, FILM COATED, EXTENDED RELEASE ORAL EVERY 12 HOURS
Qty: 60 TABLET | Refills: 0 | Status: SHIPPED | OUTPATIENT
Start: 2019-01-01

## 2019-01-01 RX ORDER — IOPAMIDOL 755 MG/ML
57 INJECTION, SOLUTION INTRAVASCULAR ONCE
Status: COMPLETED | OUTPATIENT
Start: 2019-01-01 | End: 2019-01-01

## 2019-01-01 RX ORDER — HEPARIN SODIUM (PORCINE) LOCK FLUSH IV SOLN 100 UNIT/ML 100 UNIT/ML
5 SOLUTION INTRAVENOUS EVERY 8 HOURS
Status: CANCELLED
Start: 2019-01-01

## 2019-01-01 RX ORDER — HEPARIN SODIUM,PORCINE 10 UNIT/ML
5-10 VIAL (ML) INTRAVENOUS EVERY 24 HOURS
Status: DISCONTINUED | OUTPATIENT
Start: 2019-01-01 | End: 2019-01-01 | Stop reason: HOSPADM

## 2019-01-01 RX ORDER — FUROSEMIDE 20 MG
20 TABLET ORAL DAILY
Qty: 14 TABLET | Refills: 3 | Status: SHIPPED | OUTPATIENT
Start: 2019-01-01 | End: 2020-01-07

## 2019-01-01 RX ORDER — HEPARIN SODIUM (PORCINE) LOCK FLUSH IV SOLN 100 UNIT/ML 100 UNIT/ML
5 SOLUTION INTRAVENOUS EVERY 8 HOURS
Status: DISCONTINUED | OUTPATIENT
Start: 2019-01-01 | End: 2019-01-01 | Stop reason: HOSPADM

## 2019-01-01 RX ORDER — HEPARIN SODIUM,PORCINE 10 UNIT/ML
5-10 VIAL (ML) INTRAVENOUS
Status: DISCONTINUED | OUTPATIENT
Start: 2019-01-01 | End: 2019-01-01 | Stop reason: HOSPADM

## 2019-01-01 RX ORDER — LORAZEPAM 0.5 MG/1
TABLET ORAL
COMMUNITY
Start: 2018-01-01

## 2019-01-01 RX ORDER — HEPARIN SODIUM (PORCINE) LOCK FLUSH IV SOLN 100 UNIT/ML 100 UNIT/ML
5 SOLUTION INTRAVENOUS ONCE
Status: COMPLETED | OUTPATIENT
Start: 2019-01-01 | End: 2019-01-01

## 2019-01-01 RX ADMIN — SODIUM CHLORIDE, PRESERVATIVE FREE 5 ML: 5 INJECTION INTRAVENOUS at 08:33

## 2019-01-01 RX ADMIN — Medication 500 UNITS: at 09:30

## 2019-01-01 RX ADMIN — HEPARIN 5 ML: 100 SYRINGE at 06:31

## 2019-01-01 RX ADMIN — SODIUM CHLORIDE, PRESERVATIVE FREE 5 ML: 5 INJECTION INTRAVENOUS at 11:02

## 2019-01-01 RX ADMIN — IOPAMIDOL 57 ML: 755 INJECTION, SOLUTION INTRAVENOUS at 08:23

## 2019-01-01 RX ADMIN — SODIUM CHLORIDE, PRESERVATIVE FREE 5 ML: 5 INJECTION INTRAVENOUS at 08:07

## 2019-01-01 RX ADMIN — SODIUM CHLORIDE 56 ML: 9 INJECTION, SOLUTION INTRAVENOUS at 08:23

## 2019-01-01 ASSESSMENT — PAIN SCALES - GENERAL
PAINLEVEL: NO PAIN (0)
PAINLEVEL: MILD PAIN (2)
PAINLEVEL: MILD PAIN (2)
PAINLEVEL: NO PAIN (0)

## 2019-01-01 ASSESSMENT — MIFFLIN-ST. JEOR: SCORE: 922.67

## 2019-01-01 NOTE — NURSING NOTE
"Chief Complaint   Patient presents with     Port Draw     Labs drawn from port by RN. Line flushed with saline and heparin. Vs taken - recheck BP in infusion     Port accessed with 20g 3/4\" gripper needle by RN in lab, labs collected, line flushed with saline and heparin.  Vitals taken.    Jacinda Cruz, RN  "

## 2019-01-01 NOTE — PROGRESS NOTES
Infusion Nursing Note:  Barbie Deluca presents today for possible PRBC transfusion.    Patient seen by provider today: No    Treatment Conditions:  Lab Results   Component Value Date    HGB 10.3 01/01/2019     Lab Results   Component Value Date    WBC 7.4 01/01/2019      Lab Results   Component Value Date    ANEU 5.1 01/01/2019     Lab Results   Component Value Date     01/01/2019          Note: Hgb 10.3.  Orders to transfuse 2 units PRBC if Hgb < 8.0.  No transfusions needed today.     Pt with no new concerns. Pt very happy with lab results.     /98.  Pt stated she just took her BP medications right before coming to appt.  Currently taking Metoprolol 25 mg PO BID.   Pt's daughter stated she will monitor BP at home and call with questions/concerns.    Intravenous Access:  Implanted Port. Flushed in lab and needle removed at discharged.    Discharge Plan:   Patient declined prescription refills.  Discharge instructions reviewed with: Patient.  Patient and/or family verbalized understanding of discharge instructions and all questions answered.  Copy of AVS reviewed with patient and/or family.  Patient will return 1/16/19 for next appointment.  Patient discharged in stable condition accompanied by: daughter.  Departure Mode: Ambulatory.  Face to Face time: 0.    Surekha García RN

## 2019-01-02 NOTE — TELEPHONE ENCOUNTER
Attempted to reach pt. No answer on home phone. LM with pt's daughter, Izzy, requesting a call back. Await response.

## 2019-01-02 NOTE — TELEPHONE ENCOUNTER
----- Message from Atiya Houston PA-C sent at 1/1/2019 11:15 PM CST -----  Regarding: call pt  It looks like her hemoglobin was stable today. I'd like her to get labs (CBC) checked again on Friday. Okay to do them locally if done in the AM so we can get them back same day. Also, please find out how the GI MD appt went and if she is still have black, tarry stools. Thanks.  Atiya

## 2019-01-03 NOTE — TELEPHONE ENCOUNTER
TC from pt's daughter, Izzy. She stated that she has to work on Friday and can't bring pt in for labs and possible blood, but can come on Monday. Requested Izzy and pt remain in clinic until results are in just in case pt needs blood. Izzy stated understanding. She also stated that pt was seen at [facility] on 12/31/2018, and is being treated for h pylori as well as will continue following with them. Izzy stated that pt no longer has dark, tarry stools. Care team updated.

## 2019-01-07 NOTE — Clinical Note
1/7/2019       RE: Barbie Deluca  [address] [facility] [address]     Dear Colleague,    Thank you for referring your patient, Barbie Deluca, to the [facility]. Please see a copy of my visit note below.    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  CT scan on 10/24/2018 showed a cystic mass involving the left side of the mediastinum extending from the left hilum towards the cardiac apex, abutting the pericardium over a long extent peripherally.  Enhancing soft tissue element as well surrounding atelectatic lingula.  MRA brain 10/31/2018 showed no brain metastases.  PET/CT scan 11/13/2018 and CT-guided biopsy 11/01/2018 consistent with squamous cell carcinoma. She started on neoadjuvant treatment with carboplatin and Taxol on 11/21/18.  Plan  was to complete 2-3 cycles followed by a CT scan and a plan for sequential radiation followed by maintenance immune therapy.     However she was hospitalized from 12/5-12/8/18 for failure to thrive and pneumonia. She had a brief episode of a.fib/flutter that resolved on its own during the hospitalization. She was also found to have a small malignant pericardial effusion. Due to hypoxia, a chest CT PE protocol was performed on 12/5/18, which was negative for DVT, but did show progressive disease with enlargement of the left upper lobe mass. She was hospitalized from 12/16-12/24/18 with an upper GI bleed. 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 She was discharged home on Protonix 40 mg bid and Pepcid 20 mg bid.  She has had stable hgb since that time. She has not been on any active treatment during this time. The plan  was to get a biopsy for PDL1 status but this was delayed because of her hospitalization for GI bleed. As such we have been doing best supportive cares in the meantime and monitoring her hgb.    She came to clinic today for repeat labs and possible pRBC. She admitted to new lower extremity edema. BNP was drawn and elevated to 4K. As such I am seeing her today as an add on for assessment.    Interval History:  Ms. Deluca returns to clinic today with her daughter and was seen in infusion. She states that she has had on and off swelling in her legs for a few months she has had much more significant edema the last 4 days. The swelling is improved with leg elevation, massage, and soaks in epsom salts and is worse with prolonged dependence. She admits she isn't moving much, spending appx 80% of her time in bed or resting. The leg swelling is slightly better today as she has been propping up her legs in infusion. It was mildly painful previously but denies any pain now. She does have a faint rash on her feet but no erythema or skin changes otherwise. She does feel like the fluid goes all the way up to her abdomen. While both legs are swollen they feel the left is slightly worse.    She also admits to more left sided back and chest pain. She thinks she has had this pain in the past but it has been worse the last 4 or so days. The pain radiates across her entire left side and chest and is improved with Percocet, currently doing 3-4 per day, and massage. It is tender to palpation on her back. She denies any worsening with exertion or positional changes. No fevers or cough. She has chronic ARMIJO that is stable. No SOB at rest and no orthopnea. Overall no new breathing concerns.    She does note that she was started on two antibiotics last week (she thinks Amoxicillin and Tetracycline though she is not sure about this) and since then she has been having more troubles with gas production and burping. She is still eating OK now  that her taste is improved, though admits she still hasn't been drinking well, appx 4 glasses of water per day. No nausea, vomiting, abdominal pain. Her stools are slightly constipated and she is doing Miralax with a BM this morning. No recurrent black tarry stools. No urinary concerns. No headaches and very mild lightheadedness.     Current Outpatient Medications   Medication Sig Dispense Refill     famotidine (PEPCID) 20 MG tablet Take 20 mg by mouth 2 times daily as needed        loratadine (CLARITIN) 10 MG tablet Take 1 tablet (10 mg) by mouth daily Take day of chemotherapy and for 4 days afterwards.       LORazepam (ATIVAN) 0.5 MG tablet Take 1 tablet (0.5 mg) by mouth every 4 hours as needed (Anxiety, Nausea/Vomiting or Sleep) (Patient not taking: Reported on 12/28/2018) 30 tablet 2     metoprolol tartrate (LOPRESSOR) 25 MG tablet Take 0.5 tablets (12.5 mg) by mouth 2 times daily (Patient taking differently: Take 25 mg by mouth 2 times daily ) 60 tablet 0     ondansetron (ZOFRAN) 8 MG tablet Take 0.5 tablets (4 mg) by mouth every 8 hours as needed for nausea       oxyCODONE (OXYCONTIN) 10 MG 12 hr tablet Take 1 tablet (10 mg) by mouth every 12 hours maximum 2 tablet(s) per day (Patient not taking: Reported on 12/28/2018) 60 tablet 0     oxyCODONE-acetaminophen (PERCOCET) 5-325 MG tablet Take 1-2 tablets by mouth every 4 hours as needed for severe pain (max 9/day) (Patient not taking: Reported on 12/28/2018) 120 tablet 0     pantoprazole (PROTONIX) 40 MG EC tablet Take 40 mg by mouth 2 times daily       polyethylene glycol (MIRALAX/GLYCOLAX) Packet Take 17 g by mouth daily 7 packet 3     senna-docusate (SENOKOT-S/PERICOLACE) 8.6-50 MG tablet Take 2-4 tablets by mouth 2 times daily 60 tablet 1       Physical Examination:  /84 HR 81 Temp 98.9 RR 16 Weight 124lb Pain 0/10 SpO2 94%  Wt Readings from Last 10 Encounters:   01/07/19 56.3 kg (124 lb 3.2 oz)   01/01/19 56 kg (123 lb 6.4 oz)   12/28/18 52.4 kg (115  lb 8 oz)   12/12/18 54.3 kg (119 lb 11.2 oz)   12/07/18 60.3 kg (133 lb)   12/05/18 55.4 kg (122 lb 1.6 oz)   11/28/18 56.2 kg (123 lb 14.4 oz)   11/21/18 55.4 kg (122 lb 1.6 oz)   11/20/18 54.9 kg (121 lb)   11/20/18 55.1 kg (121 lb 6.4 oz)     Constitutional: Well-appearing female in no acute distress  Eyes: EOMI, PERRL. No scleral icterus.  ENT: Oral mucosa is moist without lesions or thrush.   Lymphatic: Neck is supple without cervical or supraclavicular lymphadenopathy.  Cardiovascular: Regular rate and rhythm. No murmurs, gallops, or rubs. Bilateral 3+ pitting peripheral edema, left slightly larger than right.  Respiratory: Diffuse crackles throughout left lung fields, right lung bases with faint crackles. No wheezing. Non-labored breathing.  Gastrointestinal: Bowel sounds present. Abdomen soft, slightly distended, non-tender. No palpable hepatosplenomegaly or masses.   Neurologic: Cranial nerves II through XII are grossly intact.  Skin: Very faint pink macular rash on tops of feet. No erythema of lower extremities otherwise. No rashes, petechiae, or bruising noted on exposed skin.    Laboratory Data:  Results for RUBIN COHEN (MRN [id number]) as of 1/7/2019 12:02   1/7/2019 06:35 1/7/2019 07:50   Sodium  135   Potassium  4.1   Chloride  100   Carbon Dioxide  26   Urea Nitrogen  11   Creatinine  0.60   GFR Estimate  88   GFR Estimate If Black  >90   Calcium  8.8   Anion Gap  9   Phosphorus  4.2   Albumin  2.7 (L)   Protein Total  6.7 (L)   N-Terminal Pro Bnp  4,302 (H)   Glucose  96   WBC 8.4    Hemoglobin 9.8 (L)    Hematocrit 32.0 (L)    Platelet Count 510 (H)    RBC Count 3.43 (L)    MCV 93    MCH 28.6    MCHC 30.6 (L)    RDW 16.3 (H)    Diff Method Automated Method    % Neutrophils 81.4    % Lymphocytes 9.9    % Monocytes 6.6    % Eosinophils 1.1    % Basophils 0.6    % Immature Granulocytes 0.4    Nucleated RBCs 0    Absolute Neutrophil 6.8    Absolute Lymphocytes 0.8    Absolute Monocytes  0.6    Absolute Eosinophils 0.1    Absolute Basophils 0.1    Abs Immature Granulocytes 0.0    Absolute Nucleated RBC 0.0    ABO A    RH(D) Pos    Antibody Screen Pos (A)    Test Valid Only At [facility]...    Specimen Expires 01/10/2019    Blood Bank Comment Delay in availabi...        Assessment and Plan:  1. Cards/Vascular  New 3+ bilateral pitting edema in setting of prior pericardial effusion and recent large volume IV and pRBC transfusion with GI bleed. Vitals stable and no new breathing concerns but BNP up to 4K and she has worsening crackles throughout left leg. Also with left sided back/chest pain. Overall concern for volume overload 2/2 worsening pericardial effusion vs new heart failure.  -Echo cardiogram scheduled this afternoon. Will get CXR now with worsening crackles and left sided pain  -Check troponin and EKG for left sided pain, though less likely cardiac pain given improvement with percocet and massage. Troponin negative.  -Hypoalbuminemia could be contributing, however her albumin levels are slightly improved today and she is eating better so less likely  -Consider bilateral lower extremity US if echo unrevealing given limited mobility and slight leg discrepancy  -Low suspicion for PE given pain is improved with narcotics and massage and no SOB, though pending work-up consider CT chest as well  -History of afib/flutter and hypertension-sinus rhythm on exam today. Continue metoprolol    2. Onc  Stage [id number] lung SCC, with treatment currently on hold due to prior admission for FTT, pneumonia, and most recently upper GI bleed. Plan to do biopsy of tumor to assess for PDL1 status of tumor once other acute issues resolved. Seeing Dr. Solares 1/16 for follow-up. Not discussed in detail today, though possible her worsening left side/back/chest pain is related to progressive malignancy.  -If she is admitted, consider biopsy inpatient    3. GI  Admitted for upper GI bleed 2/2 gastric ulcers. Met with  GI at outside clinic who recommended Protonix, Pepcid, and dual antibiotic treatment (unclear on which meds and cannot see in care everywhere).  -Has had more gas/burping since starting antibiotics. Asked her to follow-up with her GI provider regarding this  -Hgb stable and she is no longer requiring pRBC. No further black tarry stools.  -Continue Miralax for constipation  -Continue Remeron for appetite stimulation.     Akshat Allan PA-C  [facility] [address] [address] [phone]      Again, thank you for allowing me to participate in the care of your patient.      Sincerely,    BLAS Root

## 2019-01-07 NOTE — PROGRESS NOTES
"Infusion Nursing Note:  Barbie Deluca presents today for possible pRBCs (patient DID NOT MEET parameters).    Patient seen by provider today: Patient will see Akshat Allan PA-C after her echo and chest x-ray today.    Intravenous Access:  Implanted Port.    Treatment Conditions:  Lab Results   Component Value Date    HGB 9.8 01/07/2019     Lab Results   Component Value Date    WBC 8.4 01/07/2019      Lab Results   Component Value Date    ANEU 6.8 01/07/2019     Lab Results   Component Value Date     01/07/2019      Lab Results   Component Value Date     01/07/2019                   Lab Results   Component Value Date    POTASSIUM 4.1 01/07/2019           Lab Results   Component Value Date    MAG 1.8 12/08/2018            Lab Results   Component Value Date    CR 0.60 01/07/2019                   Lab Results   Component Value Date    PANTERA 8.8 01/07/2019                Lab Results   Component Value Date    BILITOTAL 0.2 12/28/2018           Lab Results   Component Value Date    ALBUMIN 2.3 12/28/2018                    Lab Results   Component Value Date    ALT 27 12/28/2018           Lab Results   Component Value Date    AST 33 12/28/2018     Patient DID NOT MEET transfusion parameters today.    Note: Patient arrived stating she \"feels like she is retaining 10 lbs of fluid in her legs.\"  Patient has pitting edema in her BLE up to about her knees.  Patient reports the edema does affect her thighs at time and they \"feel tight.\"  Patient states she thinks it started when she was in the hospital a couple of weeks ago, but that it has been worse this past week.  Patent reports the edema has made it very uncomfortable to wear shoes and \"get around.\"  Patient states she tried compression stockings, but that they are too hard for her to put on and are painful.  She also tried an epsom salt bath that may have helped some.  Patient reports dyspnea on exertion (about baseline) and denies " "orthopnea.  Patient also reporting that she is on an antibiotic for H. Pylori that is causing her \"to belch.\"  Patient reports this makes it uncomfortable for her to eat and drink fluids.  Patient states her appetite has been \"a little better\" but that she is only drinking about 4 glasses of fluids/day.  Atiya Houston PA-C notified.    1/7/19 0732 TORB:  Atiya Houston PA-C/Akshat Madrid RN  - Please draw a BNP and BMP  - Instruct patient to follow-up with her GI doctor for recommendations on the GI antibiotic she is on.  It is okay for her to try simethicone for symptom management.  - Instruct patient to try ACE wraps for her edema and to keep her legs elevated when able.  Also instruct patient to try to increase protein in her diet.    Above recommendations relayed to patient and her daughter.  They both verbalized understanding.    BNP result came back elevated at 4,302.  Atiya Houston PA-C notified.    1/7/19 2426 TORB:  Atiya Houston PA-C/Akshat Madrid RN  - Will order an echo for patient today and put in request for an RAGINI visit after.    Writer talked to scheduling who got patient scheduled for an echo today followed by a visit with Akshat Allan PA-C.  Above plan relayed to patient and her daughter who verbalized understanding.    Post Infusion Assessment:  Port heparin locked and left intact for echo.    Discharge Plan:   Patient declined prescription refills.  Discharge instructions reviewed with: Patient and Family.  Patient and/or family verbalized understanding of discharge instructions and all questions answered.  Patient sent to echo from infusion.  Instructed patient and her daughter to go the chest x-ray after echo, then come back to the 2nd floor to check in to see Akshat Allan PA-C in clinic.  Patient and her daughter verbalized understanding.  Patient discharged in stable condition accompanied by: daughter.  Departure Mode: Wheelchair.  Face to Face time: 10 minutes.    AKSHAT HAIRSTON" SARAH, ANNIE

## 2019-01-07 NOTE — PROGRESS NOTES
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  CT scan on 10/24/2018 showed a cystic mass involving the left side of the mediastinum extending from the left hilum towards the cardiac apex, abutting the pericardium over a long extent peripherally.  Enhancing soft tissue element as well surrounding atelectatic lingula.  MRA brain 10/31/2018 showed no brain metastases.  PET/CT scan 11/13/2018 and CT-guided biopsy 11/01/2018 consistent with squamous cell carcinoma. She started on neoadjuvant treatment with carboplatin and Taxol on 11/21/18.  Plan was to complete 2-3 cycles followed by a CT scan and a plan for sequential radiation followed by maintenance immune therapy.     However she was hospitalized from 12/5-12/8/18 for failure to thrive and pneumonia. She had a brief episode of a.fib/flutter that resolved on its own during the hospitalization. She was also found to have a small malignant pericardial effusion. Due to hypoxia, a chest CT PE protocol was performed on 12/5/18, which was negative for DVT, but did show progressive disease with enlargement of the left upper lobe mass. She was hospitalized from 12/16-12/24/18 with an upper GI bleed. 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 She was discharged home on Protonix 40 mg bid and Pepcid 20 mg bid. She has had stable hgb since that time. She has not been on any active treatment during this time. The plan was to get a biopsy for PDL1 status but this was delayed because of her hospitalization for GI bleed. As such we have been doing best supportive cares in the meantime and monitoring her hgb.    She came to clinic today for repeat labs and possible pRBC. She  admitted to new lower extremity edema. BNP was drawn and elevated to 4K. As such I am seeing her today as an add on for assessment.    Interval History:  Ms. Deluca returns to clinic today with her daughter and was seen in infusion. She states that she has had on and off swelling in her legs for a few months she has had much more significant edema the last 4 days. The swelling is improved with leg elevation, massage, and soaks in epsom salts and is worse with prolonged dependence. She admits she isn't moving much, spending appx 80% of her time in bed or resting. The leg swelling is slightly better today as she has been propping up her legs in infusion. It was mildly painful previously but denies any pain now. She does have a faint rash on her feet but no erythema or skin changes otherwise. She does feel like the fluid goes all the way up to her abdomen. While both legs are swollen they feel the left is slightly worse.    She also admits to more left sided back and chest pain. She thinks she has had this pain in the past but it has been worse the last 4 or so days. The pain radiates across her entire left side and chest and is improved with Percocet, currently doing 3-4 per day, and massage. It is tender to palpation on her back. She denies any worsening with exertion or positional changes. No fevers or cough. She has chronic ARMIJO that is stable. No SOB at rest and no orthopnea. Overall no new breathing concerns.    She does note that she was started on two antibiotics last week (she thinks Amoxicillin and Tetracycline though she is not sure about this) and since then she has been having more troubles with gas production and burping. She is still eating OK now that her taste is improved, though admits she still hasn't been drinking well, appx 4 glasses of water per day. No nausea, vomiting, abdominal pain. Her stools are slightly constipated and she is doing Miralax with a BM this morning. No recurrent black tarry  stools. No urinary concerns. No headaches and very mild lightheadedness.     Current Outpatient Medications   Medication Sig Dispense Refill     famotidine (PEPCID) 20 MG tablet Take 20 mg by mouth 2 times daily as needed        loratadine (CLARITIN) 10 MG tablet Take 1 tablet (10 mg) by mouth daily Take day of chemotherapy and for 4 days afterwards.       LORazepam (ATIVAN) 0.5 MG tablet Take 1 tablet (0.5 mg) by mouth every 4 hours as needed (Anxiety, Nausea/Vomiting or Sleep) (Patient not taking: Reported on 12/28/2018) 30 tablet 2     metoprolol tartrate (LOPRESSOR) 25 MG tablet Take 0.5 tablets (12.5 mg) by mouth 2 times daily (Patient taking differently: Take 25 mg by mouth 2 times daily ) 60 tablet 0     ondansetron (ZOFRAN) 8 MG tablet Take 0.5 tablets (4 mg) by mouth every 8 hours as needed for nausea       oxyCODONE (OXYCONTIN) 10 MG 12 hr tablet Take 1 tablet (10 mg) by mouth every 12 hours maximum 2 tablet(s) per day (Patient not taking: Reported on 12/28/2018) 60 tablet 0     oxyCODONE-acetaminophen (PERCOCET) 5-325 MG tablet Take 1-2 tablets by mouth every 4 hours as needed for severe pain (max 9/day) (Patient not taking: Reported on 12/28/2018) 120 tablet 0     pantoprazole (PROTONIX) 40 MG EC tablet Take 40 mg by mouth 2 times daily       polyethylene glycol (MIRALAX/GLYCOLAX) Packet Take 17 g by mouth daily 7 packet 3     senna-docusate (SENOKOT-S/PERICOLACE) 8.6-50 MG tablet Take 2-4 tablets by mouth 2 times daily 60 tablet 1       Physical Examination:  /84 HR 81 Temp 98.9 RR 16 Weight 124lb Pain 0/10 SpO2 94%  Wt Readings from Last 10 Encounters:   01/07/19 56.3 kg (124 lb 3.2 oz)   01/01/19 56 kg (123 lb 6.4 oz)   12/28/18 52.4 kg (115 lb 8 oz)   12/12/18 54.3 kg (119 lb 11.2 oz)   12/07/18 60.3 kg (133 lb)   12/05/18 55.4 kg (122 lb 1.6 oz)   11/28/18 56.2 kg (123 lb 14.4 oz)   11/21/18 55.4 kg (122 lb 1.6 oz)   11/20/18 54.9 kg (121 lb)   11/20/18 55.1 kg (121 lb 6.4 oz)      Constitutional: Well-appearing female in no acute distress  Eyes: EOMI, PERRL. No scleral icterus.  ENT: Oral mucosa is moist without lesions or thrush.   Lymphatic: Neck is supple without cervical or supraclavicular lymphadenopathy.  Cardiovascular: Regular rate and rhythm. No murmurs, gallops, or rubs. Bilateral 3+ pitting peripheral edema, left slightly larger than right.  Respiratory: Diffuse crackles throughout left lung fields, right lung bases with faint crackles. No wheezing. Non-labored breathing.  Gastrointestinal: Bowel sounds present. Abdomen soft, slightly distended, non-tender. No palpable hepatosplenomegaly or masses.   Neurologic: Cranial nerves II through XII are grossly intact.  Skin: Very faint pink macular rash on tops of feet. No erythema of lower extremities otherwise. No rashes, petechiae, or bruising noted on exposed skin.    Laboratory Data:  Results for RUBIN COHEN (MRN [id number]) as of 1/7/2019 12:02   1/7/2019 06:35 1/7/2019 07:50   Sodium  135   Potassium  4.1   Chloride  100   Carbon Dioxide  26   Urea Nitrogen  11   Creatinine  0.60   GFR Estimate  88   GFR Estimate If Black  >90   Calcium  8.8   Anion Gap  9   Phosphorus  4.2   Albumin  2.7 (L)   Protein Total  6.7 (L)   N-Terminal Pro Bnp  4,302 (H)   Glucose  96   WBC 8.4    Hemoglobin 9.8 (L)    Hematocrit 32.0 (L)    Platelet Count 510 (H)    RBC Count 3.43 (L)    MCV 93    MCH 28.6    MCHC 30.6 (L)    RDW 16.3 (H)    Diff Method Automated Method    % Neutrophils 81.4    % Lymphocytes 9.9    % Monocytes 6.6    % Eosinophils 1.1    % Basophils 0.6    % Immature Granulocytes 0.4    Nucleated RBCs 0    Absolute Neutrophil 6.8    Absolute Lymphocytes 0.8    Absolute Monocytes 0.6    Absolute Eosinophils 0.1    Absolute Basophils 0.1    Abs Immature Granulocytes 0.0    Absolute Nucleated RBC 0.0    ABO A    RH(D) Pos    Antibody Screen Pos (A)    Test Valid Only At [facility].    Specimen Expires 01/10/2019     Blood Bank Comment Delay in availabi...        Assessment and Plan:  1. Cards/Vascular  New 3+ bilateral pitting edema in setting of prior pericardial effusion and recent large volume IV and pRBC transfusion with GI bleed. Vitals stable and no new breathing concerns but BNP up to 4K and she has worsening crackles throughout left leg. Also with left sided back/chest pain. Overall concern for volume overload 2/2 worsening pericardial effusion vs new heart failure.  -Echo cardiogram scheduled this afternoon. Will get CXR now with worsening crackles and left sided pain  -Check troponin and EKG for left sided pain, though less likely cardiac pain given improvement with percocet and massage. Troponin negative and EKG unremarkable.  -Hypoalbuminemia could be contributing, however her albumin levels are slightly improved today and she is eating better so less likely  -Consider bilateral lower extremity US if echo unrevealing given limited mobility and slight leg discrepancy  -Low suspicion for PE given pain is improved with narcotics and massage and no SOB, though pending work-up consider CT chest as well  -History of afib/flutter and hypertension-sinus rhythm on exam today. Continue metoprolol    Echo with normal EF 60-65%, mild LVH and pulmonary HTN, and no pericardial effusion. CXR stable. As such will hold off on admission. Will treat for mild volume overload with Lasix 20mg daily and have nursing call her in a few days to see if there is any improvement. She also needs a BMP checked later this week to monitor creat and potassium. Asked her to stop the Lasix and call us if she develops dizziness. She has scheduled provider follow-up next week.    Will get bilateral LE US tomorrow to r/o DVT.     2. Onc  Stage [id number] lung SCC, with treatment currently on hold due to prior admission for FTT, pneumonia, and most recently upper GI bleed. Plan to do biopsy of tumor to assess for PDL1 status of tumor once other acute  issues resolved. Seeing Dr. Solares 1/16 for follow-up. Not discussed in detail today, though possible her worsening left side/back/chest pain is related to progressive malignancy.    3. GI  Admitted for upper GI bleed 2/2 gastric ulcers. Met with GI at outside clinic who recommended Protonix, Pepcid, and dual antibiotic treatment (unclear on which meds and cannot see in care everywhere).  -Has had more gas/burping since starting antibiotics. Asked her to follow-up with her GI provider regarding this  -Hgb stable and she is no longer requiring pRBC. No further black tarry stools.  -Continue Miralax for constipation  -Continue Remeron for appetite stimulation.    Greater than 25 min was spent with the patient with >50% of the time spent in counseling and coordinating care.      Akshat Allan PA-C  [facility]  [address], [place] [id number] [phone]

## 2019-01-07 NOTE — LETTER
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  CT scan on 10/24/2018 showed a cystic mass involving the left side of the mediastinum extending from the left hilum towards the cardiac apex, abutting the pericardium over a long extent peripherally.  Enhancing soft tissue element as well surrounding atelectatic lingula.  MRA brain 10/31/2018 showed no brain metastases.  PET/CT scan 11/13/2018 and CT-guided biopsy 11/01/2018 consistent with squamous cell carcinoma. She started on neoadjuvant treatment with carboplatin and Taxol on 11/21/18.  Plan  was to complete 2-3 cycles followed by a CT scan and a plan for sequential radiation followed by maintenance immune therapy.     However she was hospitalized from 12/5-12/8/18 for failure to thrive and pneumonia. She had a brief episode of a.fib/flutter that resolved on its own during the hospitalization. She was also found to have a small malignant pericardial effusion. Due to hypoxia, a chest CT PE protocol was performed on 12/5/18, which was negative for DVT, but did show progressive disease with enlargement of the left upper lobe mass. She was hospitalized from 12/16-12/24/18 with an upper GI bleed. 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 She was discharged home on Protonix 40 mg bid and Pepcid 20 mg bid.  She has had stable hgb since that time. She has not been on any active treatment during this time. The plan was to get a biopsy for PDL1 status but this was delayed because of her hospitalization for GI bleed. As such we have been doing best supportive cares in the meantime  and monitoring her hgb.    She came to clinic today for repeat labs and possible pRBC. She admitted to new lower extremity edema. BNP was drawn and elevated to 4K. As such I am seeing her today as an add on for assessment.    Interval History:  Ms. Deluca returns to clinic today with her daughter and was seen in infusion. She states that she has had on and off swelling in her legs for a few months she has had much more significant edema the last 4 days. The swelling is improved with leg elevation, massage, and soaks in epsom salts and is worse with prolonged dependence. She admits she isn't moving much, spending appx 80% of her time in bed or resting. The leg swelling is slightly better today as she has been propping up her legs in infusion. It was mildly painful previously but denies any pain now. She does have a faint rash on her feet but no erythema or skin changes otherwise. She does feel like the fluid goes all the way up to her abdomen. While both legs are swollen they feel the left is slightly worse.    She also admits to more left sided back and chest pain. She thinks she has had this pain in the past but it has been worse the last 4 or so days. The pain radiates across her entire left side and chest and is improved with Percocet, currently doing 3-4 per day, and massage. It is tender to palpation on her back. She denies any worsening with exertion or positional changes. No fevers or cough. She has chronic ARMIJO that is stable. No SOB at rest and no orthopnea. Overall no new breathing concerns.    She does note that she was started on two antibiotics last week (she thinks Amoxicillin and Tetracycline though she is not sure about this) and since then she has been having more troubles with gas production and burping. She is still eating OK now that her taste is improved, though admits she still hasn't been drinking well, appx 4 glasses of water per day. No nausea, vomiting, abdominal pain. Her stools are  slightly constipated and she is doing Miralax with a BM this morning. No recurrent black tarry stools. No urinary concerns. No headaches and very mild lightheadedness.     Current Outpatient Medications   Medication Sig Dispense Refill     famotidine (PEPCID) 20 MG tablet Take 20 mg by mouth 2 times daily as needed        loratadine (CLARITIN) 10 MG tablet Take 1 tablet (10 mg) by mouth daily Take day of chemotherapy and for 4 days afterwards.       LORazepam (ATIVAN) 0.5 MG tablet Take 1 tablet (0.5 mg) by mouth every 4 hours as needed (Anxiety, Nausea/Vomiting or Sleep) (Patient not taking: Reported on 12/28/2018) 30 tablet 2     metoprolol tartrate (LOPRESSOR) 25 MG tablet Take 0.5 tablets (12.5 mg) by mouth 2 times daily (Patient taking differently: Take 25 mg by mouth 2 times daily ) 60 tablet 0     ondansetron (ZOFRAN) 8 MG tablet Take 0.5 tablets (4 mg) by mouth every 8 hours as needed for nausea       oxyCODONE (OXYCONTIN) 10 MG 12 hr tablet Take 1 tablet (10 mg) by mouth every 12 hours maximum 2 tablet(s) per day (Patient not taking: Reported on 12/28/2018) 60 tablet 0     oxyCODONE-acetaminophen (PERCOCET) 5-325 MG tablet Take 1-2 tablets by mouth every 4 hours as needed for severe pain (max 9/day) (Patient not taking: Reported on 12/28/2018) 120 tablet 0     pantoprazole (PROTONIX) 40 MG EC tablet Take 40 mg by mouth 2 times daily       polyethylene glycol (MIRALAX/GLYCOLAX) Packet Take 17 g by mouth daily 7 packet 3     senna-docusate (SENOKOT-S/PERICOLACE) 8.6-50 MG tablet Take 2-4 tablets by mouth 2 times daily 60 tablet 1       Physical Examination:  /84 HR 81 Temp 98.9 RR 16 Weight 124lb Pain 0/10 SpO2 94%  Wt Readings from Last 10 Encounters:   01/07/19 56.3 kg (124 lb 3.2 oz)   01/01/19 56 kg (123 lb 6.4 oz)   12/28/18 52.4 kg (115 lb 8 oz)   12/12/18 54.3 kg (119 lb 11.2 oz)   12/07/18 60.3 kg (133 lb)   12/05/18 55.4 kg (122 lb 1.6 oz)   11/28/18 56.2 kg (123 lb 14.4 oz)   11/21/18 55.4 kg  (122 lb 1.6 oz)   11/20/18 54.9 kg (121 lb)   11/20/18 55.1 kg (121 lb 6.4 oz)     Constitutional: Well-appearing female in no acute distress  Eyes: EOMI, PERRL. No scleral icterus.  ENT: Oral mucosa is moist without lesions or thrush.   Lymphatic: Neck is supple without cervical or supraclavicular lymphadenopathy.  Cardiovascular: Regular rate and rhythm. No murmurs, gallops, or rubs. Bilateral 3+ pitting peripheral edema, left slightly larger than right.  Respiratory: Diffuse crackles throughout left lung fields, right lung bases with faint crackles. No wheezing. Non-labored breathing.  Gastrointestinal: Bowel sounds present. Abdomen soft, slightly distended, non-tender. No palpable hepatosplenomegaly or masses.   Neurologic: Cranial nerves II through XII are grossly intact.  Skin: Very faint pink macular rash on tops of feet. No erythema of lower extremities otherwise. No rashes, petechiae, or bruising noted on exposed skin.    Laboratory Data:  Results for RUBIN COHEN (MRN [id number]) as of 1/7/2019 12:02   1/7/2019 06:35 1/7/2019 07:50   Sodium  135   Potassium  4.1   Chloride  100   Carbon Dioxide  26   Urea Nitrogen  11   Creatinine  0.60   GFR Estimate  88   GFR Estimate If Black  >90   Calcium  8.8   Anion Gap  9   Phosphorus  4.2   Albumin  2.7 (L)   Protein Total  6.7 (L)   N-Terminal Pro Bnp  4,302 (H)   Glucose  96   WBC 8.4    Hemoglobin 9.8 (L)    Hematocrit 32.0 (L)    Platelet Count 510 (H)    RBC Count 3.43 (L)    MCV 93    MCH 28.6    MCHC 30.6 (L)    RDW 16.3 (H)    Diff Method Automated Method    % Neutrophils 81.4    % Lymphocytes 9.9    % Monocytes 6.6    % Eosinophils 1.1    % Basophils 0.6    % Immature Granulocytes 0.4    Nucleated RBCs 0    Absolute Neutrophil 6.8    Absolute Lymphocytes 0.8    Absolute Monocytes 0.6    Absolute Eosinophils 0.1    Absolute Basophils 0.1    Abs Immature Granulocytes 0.0    Absolute Nucleated RBC 0.0    ABO A    RH(D) Pos    Antibody Screen Pos  (A)    Test Valid Only At [facility]...    Specimen Expires 01/10/2019    Blood Bank Comment Delay in availabi...        Assessment and Plan:  1. Cards/Vascular  New 3+ bilateral pitting edema in setting of prior pericardial effusion and recent large volume IV and pRBC transfusion with GI bleed. Vitals stable and no new breathing concerns but BNP up to 4K and she has worsening crackles throughout left leg. Also with left sided back/chest pain. Overall concern for volume overload 2/2 worsening pericardial effusion vs new heart failure.  -Echo cardiogram scheduled this afternoon. Will get CXR now with worsening crackles and left sided pain  -Check troponin and EKG for left sided pain, though less likely cardiac pain given improvement with percocet and massage. Troponin negative and EKG unremarkable.  -Hypoalbuminemia could be contributing, however her albumin levels are slightly improved today and she is eating better so less likely  -Consider bilateral lower extremity US if echo unrevealing given limited mobility and slight leg discrepancy  -Low suspicion for PE given pain is improved with narcotics and massage and no SOB, though pending work-up consider CT chest as well  -History of afib/flutter and hypertension-sinus rhythm on exam today. Continue metoprolol    Echo with normal EF 60-65%, mild LVH and pulmonary HTN, and no pericardial effusion. CXR stable. As such will hold off on admission. Will treat for mild volume overload with Lasix 20mg daily and have nursing call her in a few days to see if there is any improvement. She also needs a BMP checked later this week to monitor creat and potassium. Asked her to stop the Lasix and call us if she develops dizziness. She has scheduled provider follow-up next week.    Will get bilateral LE US tomorrow to r/o DVT.     2. Onc  Stage [id number] lung SCC, with treatment currently on hold due to prior admission for FTT, pneumonia, and most recently upper GI bleed. Plan  to do biopsy of tumor to assess for PDL1 status of tumor once other acute issues resolved. Seeing Dr. Solares 1/16 for follow-up. Not discussed in detail today, though possible her worsening left side/back/chest pain is related to progressive malignancy.    3. GI  Admitted for upper GI bleed 2/2 gastric ulcers. Met with GI at outside clinic who recommended Protonix, Pepcid, and dual antibiotic treatment (unclear on which meds and cannot see in care everywhere).  -Has had more gas/burping since starting antibiotics. Asked her to follow-up with her GI provider regarding this  -Hgb stable and she is no longer requiring pRBC. No further black tarry stools.  -Continue Miralax for constipation  -Continue Remeron for appetite stimulation.    Greater than 25 min was spent with the patient with >50% of the time spent in counseling and coordinating care.      Akshat Allan PA-C  [facility]  [address] [place], [place] [address] [phone]      BLAS Root

## 2019-01-07 NOTE — NURSING NOTE
Chief Complaint   Patient presents with     Port Draw     Labs drawn via port by RN in lab. VS taken.      Labs drawn via Port accessed using 20g gripper needle. Line flushed and Heparin locked. Vital signs taken. Checked into next appointment.     Mercy Madrigal RN

## 2019-01-08 NOTE — TELEPHONE ENCOUNTER
TC to pt's daughter, Izzy. Izzy stated that pt has experienced some diuresis since starting on Lasix, and that her legs above the knee, and looking better. Izzy stated she is refusing to wear compression socks, but has agreed to leg wraps. Pt is planning on going in for lab work on Friday [facility] in [place]. BMP order faxed to [facility].

## 2019-01-08 NOTE — TELEPHONE ENCOUNTER
----- Message from BLAS Root sent at 1/8/2019  9:12 AM CST -----  Regarding: Local labs and call patient  Hi Kylee,    I saw Barbie yesterday as an add on for leg swelling. I started her on Lasix 20mg daily.    -She needs a basic metabolic panel drawn either Thursday or Friday this week and would like to get this done locally. Can you help arrange? An order for BMP is placed.    -Can you also call her Thursday or Friday to see how her leg swelling is doing and let either myself or Atiya know?    Thank you!  Akshat Allan PA-C

## 2019-01-11 NOTE — PROGRESS NOTES
TC to pt's son, Krishna, as he is taking pt to get her labs drawn today. No answer. [facility] with condition update. Await response.     ----- Message -----   From: Akshat Allan PA   Sent: 1/8/2019   9:12 AM   To: Atiya Houston PA-C, Kylee Husain RN   Subject: Local labs and call patient                       Hi Kylee,     I saw Barbie yesterday as an add on for leg swelling. I started her on Lasix 20mg daily.     -She needs a basic metabolic panel drawn either Thursday or Friday this week and would like to get this done locally. Can you help arrange? An order for BMP is placed.     -Can you also call her Thursday or Friday to see how her leg swelling is doing and let either myself or Atiya know?     Thank you!   Akshat Allan PA-C     ___________________________________________    VM from Krishna stating that pt has been admitted to [facility].     Attempted to reach Krishna. No answer. [facility]MT[facility] clinic. Also requested that if he and writer don't connect today that he assist with getting a hospital follow-up appt made at [facility] upon discharge.    Spoke with Krishna. Reviewed pt's hospital follow-up appt was already arranged as a regular visit at [facility] on Wednesday at 1030. Krishna stated understanding and plans to bring pt to the appt.

## 2019-01-16 NOTE — PROGRESS NOTES
Infusion Nursing Note:  Barbie Mariano Pevanessa presents today for Port Labs--pt didn't want any IVF until seen by provider.    Patient seen by provider today: Yes: Dr. Solares   present during visit today: Not Applicable.    Note: N/A.    Intravenous Access:  Labs drawn without difficulty.  Implanted Port.    Treatment Conditions:  Not Applicable.      Post Infusion Assessment:  Site patent and intact, free from redness, edema or discomfort.  No evidence of extravasations.  Access discontinued per protocol.    Discharge Plan:   Patient discharged in stable condition accompanied by: daughter.  Departure Mode: Ambulatory to clinic.    Eriberto Manzanares RN

## 2019-01-16 NOTE — PATIENT INSTRUCTIONS
1.  CT scan chest abdomen and pelvis this week scheduled/janice   2-  RTC MD on Tuesday @ the [facility]  scheduled  3- Chemo education- Pembrolizumab/ Done AG      AVS printed & given to patient/janice

## 2019-01-16 NOTE — PROGRESS NOTES
"Oncology Rooming Note    January 16, 2019 11:57 AM   Barbie Deluca is a 78 year old female who presents for:    Chief Complaint   Patient presents with     Oncology Clinic Visit     Initial Vitals: /85   Pulse 64   Temp 98.9  F (37.2  C)   Resp 16   Wt 53.4 kg (117 lb 12.8 oz)   SpO2 92%   BMI 21.55 kg/m   Estimated body mass index is 21.55 kg/m  as calculated from the following:    Height as of 11/28/18: 1.575 m (5' 2\").    Weight as of this encounter: 53.4 kg (117 lb 12.8 oz). Body surface area is 1.53 meters squared.  Mild Pain (2) Comment: Data Unavailable   No LMP recorded. Patient is postmenopausal.  Allergies reviewed: Yes  Medications reviewed: Yes    Medications: MEDICATION REFILLS NEEDED TODAY. Provider was notified.Needs Oxycontin refill--wants to take Rx with her to another pharmacy.  Pharmacy name entered into [email or web address]: [facility] [address] [place] [address] [address] AT [facility] OF [address]    Clinical concerns: Pt's concerned about her Left-side peripheral vision loss due to CVA.    was notified.      Melany Han RN              "

## 2019-01-16 NOTE — PROGRESS NOTES
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  CT scan 10/24/2018:  A cystic mass involving the left side of the mediastinum extending from the left hilum towards the cardiac apex, abutting the pericardium over a long extent peripherally.  Enhancing soft tissue element as well surrounding atelectatic lingula.  MRA brain 10/31/2018, no brain metastases.  PET/CT scan 11/13/2018 and CT-guided biopsy 11/01/2018 consistent with squamous cell carcinoma.      TREATMENT:  The patient is initiating neoadjuvant treatment with carboplatin and Taxol.  Plan to complete 2-3 cycles followed by a CT scan and a plan for sequential radiation followed by maintenance immune therapy.      SUBJECTIVE:  The patient was seen as a followup today. She had multiple serious medical issues after her first cycle of chemotherapy including a stroke. Her vision has been partially impaired. She is quite deconditioned.     REVIEW OF SYSTEMS:  A complete review of systems was performed and found to be negative other than pertinent positives mentioned in history of present illness.      Past medical, social histories reviewed.     Meds- Reviewed.     PHYSICAL EXAMINATION:   VITAL SIGNS: /85   Pulse 64   Temp 98.9  F (37.2  C)   Resp 16   Wt 53.4 kg (117 lb 12.8 oz)   SpO2 92%   BMI 21.55 kg/m    CONSTITUTIONAL: Appears tired  HEENT: Pupils are equal. Oropharynx is clear.   NECK: No cervical or supraclavicular lymphadenopathy.   RESPIRATORY: Clear bilaterally.   CARD/VASC: S1, S2, regular.   GI: Soft, nontender, nondistended, no hepatosplenomegaly.   MUSKULOSKELETAL: Warm, well perfused.   NEUROLOGIC: Alert, awake.   INTEGUMENT: No rash.   LYMPHATICS: No edema.   PSYCH: Anxious     LABORATORY DATA AND IMAGING REVIEWED DURING THIS VISIT:  Recent Labs   Lab Test 01/09/19  1324 01/07/19  9323   12/08/18  0558  12/07/18  0557   * 135   < > 132*   < > 129*   POTASSIUM 4.3 4.1   < > 4.6  --  4.4   CHLORIDE 97* 100   < > 96  --  97   CO2 27 26   < > 28  --  25   ANIONGAP 11 9   < > 7  --  7   BUN 14 11   < > 10  --  10   CR 0.77 0.60   < > 0.61  --  0.56   * 96   < > 105*  --  174*   PANTERA 9.3 8.8   < > 8.3*  --  7.5*   MAG  --   --   --  1.8  --  2.0   PHOS  --  4.2  --  3.3  --  2.2*    < > = values in this interval not displayed.     Recent Labs   Lab Test 01/07/19  0635 01/01/19  0805 12/29/18  1212   WBC 8.4 7.4 7.1   HGB 9.8* 10.3* 10.5*   * 472* 383   MCV 93 92 91   NEUTROPHIL 81.4 69.6 73.6     Recent Labs   Lab Test 01/07/19  0750 12/28/18  1305 12/12/18  0925 12/06/18  0513   BILITOTAL  --  0.2 0.4 0.3   ALKPHOS  --  100 129 136   ALT  --  27 44 17   AST  --  33 44 18   ALBUMIN 2.7* 2.3* 2.4* 2.1*     ECOG PS: 1     ASSESSMENT:  This is a 78-year-old lady with squamous cell carcinoma, which appears to have originated from the main stem bronchus.  There is evidence of a pericardium-based mass and pericardial effusion.  There was a concern for intraluminal extension into the superior vena cava and a tumor deposit between the ascending aorta and superior vena cava.      Her case was reviewed in our multidisciplinary Thoracic Oncology conference on 11/13/2018 with a concern for M1 disease discussed; the consensus was to approach with a curative intent starting with chemotherapy and followed by assessment of response and doing sequential radiation to her chest as well.  We planned to complete 2-3 cycles of chemotherapy followed by a PET/CT scan.      - 11/20/18 she started carboplatin and Taxol. She had 1 cycle.   - She was hospitalized from 12/5-12/8/18 for failure to thrive and pneumonia. CT PE protocol was performed on 12/5/18, which was negative for DVT, but did show progressive disease with enlargement of the left upper lobe mass. She was hospitalized from 12/16-12/24/18 with an  upper GI bleed. 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   - She is very deconditioned. She does not want to get more chemotherapy but is open to consider immunotherapy. We discussed the option of using Pembrolizumab. We had a detailed discussion about this agent, intent of treatment and also potential risks and benefits. We discussed the adverse effects related to pembrolizumab which is given intravenously.   Patient asked questions and expressed willingness to proceed with the treatment.      PLAN:   1.  CT scan chest abdomen and pelvis this week  2-  RTC MD on Tuesday  3- Chemo education- Pembrolizumab    REUBEN JOAQUIN MD    1/16/2019     cc:   Hesham Graham MD   [facility] Thoracic Surgery    [address] [place] [id number]      Donita Pina MD   [facility] Radiation Oncology    [address] [place] [phone]

## 2019-01-16 NOTE — LETTER
1/16/2019         RE: Barbie Deluca  [address] [facility] [address]        Dear Colleague,    Thank you for referring your patient, Barbie Deluca, to the [facility]. Please see a copy of my visit note below.    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  CT scan 10/24/2018:  A cystic mass involving the left side of the mediastinum extending from the left hilum towards the cardiac apex, abutting the pericardium over a long extent peripherally.  Enhancing soft tissue element as well surrounding atelectatic lingula.  MRA brain 10/31/2018, no brain metastases.  PET/CT scan 11/13/2018 and CT-guided biopsy 11/01/2018 consistent with squamous cell carcinoma.      TREATMENT:  The patient is initiating neoadjuvant treatment with carboplatin and Taxol.  Plan to complete 2-3 cycles followed by a CT scan and a plan for sequential radiation followed by maintenance immune therapy.      SUBJECTIVE:  The patient was seen as a followup today. She had multiple serious medical issues after her first cycle of chemotherapy including a stroke. Her vision has been partially impaired. She is quite deconditioned.     REVIEW OF SYSTEMS:  A complete review of systems was performed and found to be negative other than pertinent positives mentioned in history of present illness.      Past medical, social histories reviewed.     Meds- Reviewed.     PHYSICAL EXAMINATION:   VITAL SIGNS: /85   Pulse 64   Temp 98.9  F (37.2  C)   Resp 16   Wt 53.4 kg (117 lb 12.8 oz)   SpO2 92%   BMI 21.55 kg/m     CONSTITUTIONAL: Appears tired  HEENT: Pupils are equal. Oropharynx is clear.   NECK: No cervical or supraclavicular lymphadenopathy.   RESPIRATORY: Clear bilaterally.   CARD/VASC: S1, S2, regular.   GI: Soft, nontender, nondistended, no  hepatosplenomegaly.   MUSKULOSKELETAL: Warm, well perfused.   NEUROLOGIC: Alert, awake.   INTEGUMENT: No rash.   LYMPHATICS: No edema.   PSYCH: Anxious     LABORATORY DATA AND IMAGING REVIEWED DURING THIS VISIT:  Recent Labs   Lab Test 01/09/19  1324 01/07/19  0750  12/08/18  0558  12/07/18  0557   * 135   < > 132*   < > 129*   POTASSIUM 4.3 4.1   < > 4.6  --  4.4   CHLORIDE 97* 100   < > 96  --  97   CO2 27 26   < > 28  --  25   ANIONGAP 11 9   < > 7  --  7   BUN 14 11   < > 10  --  10   CR 0.77 0.60   < > 0.61  --  0.56   * 96   < > 105*  --  174*   PANTERA 9.3 8.8   < > 8.3*  --  7.5*   MAG  --   --   --  1.8  --  2.0   PHOS  --  4.2  --  3.3  --  2.2*    < > = values in this interval not displayed.     Recent Labs   Lab Test 01/07/19  0635 01/01/19  0805 12/29/18  1212   WBC 8.4 7.4 7.1   HGB 9.8* 10.3* 10.5*   * 472* 383   MCV 93 92 91   NEUTROPHIL 81.4 69.6 73.6     Recent Labs   Lab Test 01/07/19  0750 12/28/18  1305 12/12/18  0925 12/06/18  0513   BILITOTAL  --  0.2 0.4 0.3   ALKPHOS  --  100 129 136   ALT  --  27 44 17   AST  --  33 44 18   ALBUMIN 2.7* 2.3* 2.4* 2.1*     ECOG PS: 1     ASSESSMENT:  This is a 78-year-old lady with squamous cell carcinoma, which appears to have originated from the main stem bronchus.  There is evidence of a pericardium-based mass and pericardial effusion.  There was a concern for intraluminal extension into the superior vena cava and a tumor deposit between the ascending aorta and superior vena cava.      Her case was reviewed in our multidisciplinary Thoracic Oncology conference on 11/13/2018 with a concern for M1 disease discussed; the consensus was to approach with a curative intent starting with chemotherapy and followed by assessment of response and doing sequential radiation to her chest as well.  We planned to complete 2-3 cycles of chemotherapy followed by a PET/CT scan.      - 11/20/18 she started carboplatin and Taxol. She had 1 cycle.   - She was  "hospitalized from 12/5-12/8/18 for failure to thrive and pneumonia. CT PE protocol was performed on 12/5/18, which was negative for DVT, but did show progressive disease with enlargement of the left upper lobe mass. She was hospitalized from 12/16-12/24/18 with an upper GI bleed. 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   - She is very deconditioned. She does not want to get more chemotherapy but is open to consider immunotherapy. We discussed the option of using Pembrolizumab. We had a detailed discussion about this agent, intent of treatment and also potential risks and benefits. We discussed the adverse effects related to pembrolizumab which is given intravenously.   Patient asked questions and expressed willingness to proceed with the treatment.      PLAN:   1.  CT scan chest abdomen and pelvis this week  2-  RTC MD on Tuesday  3- Chemo education- Pembrolizumab    REUBEN JOAQUIN MD    1/16/2019     cc:   Hesham Graham MD   [facility] Thoracic Surgery    [address] [place] [address]      Donita Pina MD   [facility] Radiation Oncology    [address] [place] [address]         Oncology Rooming Note    January 16, 2019 11:57 AM   Barbie Deluca is a 78 year old female who presents for:    Chief Complaint   Patient presents with     Oncology Clinic Visit     Initial Vitals: /85   Pulse 64   Temp 98.9  F (37.2  C)   Resp 16   Wt 53.4 kg (117 lb 12.8 oz)   SpO2 92%   BMI 21.55 kg/m    Estimated body mass index is 21.55 kg/m  as calculated from the following:    Height as of 11/28/18: 1.575 m (5' 2\").    Weight as of this encounter: 53.4 kg (117 lb 12.8 oz). Body surface area is 1.53 meters squared.  Mild Pain (2) Comment: Data Unavailable   No LMP recorded. Patient is postmenopausal.  Allergies reviewed: Yes  Medications reviewed: Yes    Medications: MEDICATION REFILLS " NEEDED TODAY. Provider was notified.Needs Oxycontin refill--wants to take Rx with her to another pharmacy.  Pharmacy name entered into 5 CUPS and some sugar: [facility] [address] AT [facility] [address]    Clinical concerns: Pt's concerned about her Left-side peripheral vision loss due to CVA.    was notified.      Melany Han, RN                Again, thank you for allowing me to participate in the care of your patient.        Sincerely,        Tg Solares MD

## 2019-01-16 NOTE — NURSING NOTE
"Chemotherapy Education Keytruda  Patient is  here today for chemotherapy education with daughter at her side.    Treatment Goal/Regimen/Duration; Keytruda every 3 weeks and rationale for strict adherence, specific medication names including pre-treatment medications and at home scheduled or as needed medications, delivery methods, and side effects and management.  Infection prevention, and monitoring of lab values, what lab tests and what changes of these values meant, along with the possibility of hydration or blood product transfusion, or the need to defer or hold treatment.      Written Information: written information including the \"Getting Ready for Chemotherapy: What to Expect, Before, During, and After your Treatment\" booklet, specific drug information guides printed from [employer] [place], [employer] booklets \"Eating Hints: Before, During, and After Cancer Treatment\" and \"Chemotherapy and You\".  Also, a folder with information on when to contact the provider, various programs offered.  No barriers to learning identified. Patient and family verbalized understanding of all written and verbal information. All questions answered to patients satisfaction.     Other Concerns: No other concerns at this time.  Pt instructed to call with further questions or concerns.  Patient states understanding and is in agreement with this plan.  Copy of appointments, and after visit summary (AVS) given to patient. Patient discharged back to infusion in stable condtion  .  Face to Face Time with Patient: Dwight Tate          "

## 2019-01-17 NOTE — TELEPHONE ENCOUNTER
[facility]--Out-Patient Oncology  Phone call    Contacted pt per request from oncology distress screen.    Spoke briefly with pt, and offered my support.    Pt acknowledged the difficulties in her life, but also commented that she's doing as well as she can be.  She noted strong support from family as she's dealing with her cancer.    Offered my availability it pt wishes for support in the future.    SH team is available and I am happy to see pt in the clinic setting and/or reach out by phone per her request.    Colin Moya [facility]  Phone [phone]

## 2019-01-18 NOTE — TELEPHONE ENCOUNTER
Oncology Distress Screening Follow-up  Clinical Social Work  [facility]    Identified Concern and Score From Distress Screening:   3. How concerned are you about feeling depressed or very sad?   7 Abnormal         4. How concerned are you about feeling anxious or very scared?   7 Abnormal         5. Do you struggle with the loss of meaning and anu in your life?       A great deal Abnormal         6. How concerned are you about work and home life issues that may be affected by your cancer?   8 Abnormal         7. How concerned are you about knowing what resources are available to help you?   4          Date of Distress Screenin19    Intervention:   Barbie is a 78-year-old woman with a diagnosis of squamous cell carcinoma that was diagnosed 2018. Barbie is followed by Dr. Solares at the [facility]. Barbie came to see Dr. Solares 19 for chemotherapy teach at the [facility], and scored positive on distress screen. This clinician called pt today to follow-up regarding positive distress screen from this week. Per family member, Barbie was sleeping at time of this clinician's call, and family took this clinician's contact information and availability and will have Barbie return call when she is able.     Education Provided:  Onc SW contact information    Follow-up Required:   This clinician will await return call from pt. Will continue to be available for ongoing psychosocial support as needed.     Please page in the case of psychosocial distress or concern.    QUINN Murphy, [facility]  Phone: [phone]  Pager: [phone]    [facility]: M, T  *every other Thursday, 8am-4:30pm  [facility]: W, F, *every other Thursday, 8am-4:30pm

## 2019-01-22 NOTE — PROGRESS NOTES
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  CT scan 10/24/2018:  A cystic mass involving the left side of the mediastinum extending from the left hilum towards the cardiac apex, abutting the pericardium over a long extent peripherally.  Enhancing soft tissue element as well surrounding atelectatic lingula.  MRA brain 10/31/2018, no brain metastases.  PET/CT scan 11/13/2018 and CT-guided biopsy 11/01/2018 consistent with squamous cell carcinoma.      TREATMENT:  Initial plan of neoadjuvant treatment with carboplatin and Taxol, complete 2-3 cycles followed by a CT scan and a plan for sequential radiation followed by maintenance immune therapy.  She had multiple serious medical issues after her first cycle of chemotherapy including a stroke. Her vision was partially impaired. She declined further chemotherapy.      SUBJECTIVE:  The patient was seen as a followup today. Her vision has continued to decline. She has been dyspnic and is not ambulating much. Extremely fatigued.     REVIEW OF SYSTEMS:  A complete review of systems was performed and found to be negative other than pertinent positives mentioned in history of present illness.      Past medical, social histories reviewed.     Meds- Reviewed.     PHYSICAL EXAMINATION:   VITAL SIGNS: /86   Pulse 66   Temp 98  F (36.7  C) (Oral)   Wt 49.8 kg (109 lb 11.2 oz)   SpO2 92%   BMI 20.06 kg/m    CONSTITUTIONAL: Appears tired  HEENT: Pupils are equal. Oropharynx is clear.   NECK: No cervical or supraclavicular lymphadenopathy.   RESPIRATORY: Clear bilaterally.   CARD/VASC: S1, S2, regular.   GI: Soft, nontender, nondistended, no hepatosplenomegaly.   MUSKULOSKELETAL: Warm, well perfused.   NEUROLOGIC: Alert, awake.   INTEGUMENT: No rash.   LYMPHATICS: No edema.   PSYCH: Anxious     LABORATORY DATA AND IMAGING REVIEWED DURING THIS VISIT:  Recent  Labs   Lab Test 01/16/19  1059 01/09/19  1324 01/07/19  0750  12/08/18  0558  12/07/18  0557    135* 135   < > 132*   < > 129*   POTASSIUM 3.2* 4.3 4.1   < > 4.6  --  4.4   CHLORIDE 92* 97* 100   < > 96  --  97   CO2 34* 27 26   < > 28  --  25   ANIONGAP 7 11 9   < > 7  --  7   BUN 15 14 11   < > 10  --  10   CR 0.71 0.77 0.60   < > 0.61  --  0.56   * 226* 96   < > 105*  --  174*   PANTERA 8.8 9.3 8.8   < > 8.3*  --  7.5*   MAG  --   --   --   --  1.8  --  2.0   PHOS  --   --  4.2  --  3.3  --  2.2*    < > = values in this interval not displayed.     Recent Labs   Lab Test 01/16/19  1059 01/07/19  0635 01/01/19  0805   WBC 9.0 8.4 7.4   HGB 10.3* 9.8* 10.3*    510* 472*   MCV 91 93 92   NEUTROPHIL 75.9 81.4 69.6     Recent Labs   Lab Test 01/16/19  1059 01/07/19  0750 12/28/18  1305 12/12/18  0925   BILITOTAL 0.6  --  0.2 0.4   ALKPHOS 143  --  100 129   ALT 19  --  27 44   AST 16  --  33 44   ALBUMIN 2.7* 2.7* 2.3* 2.4*         Results for orders placed or performed during the hospital encounter of 01/21/19   CT Chest/Abdomen/Pelvis w Contrast    Narrative    CT CHEST, ABDOMEN, AND PELVIS WITH CONTRAST 1/21/2019 8:31 AM     HISTORY: Enlarged lymph nodes, pelvic/inguinal. Follow up non-small  cell cancer of left lung (H).    COMPARISON: December 5, 2018    TECHNIQUE: Volumetric helical acquisition of CT images from the lung  apices through the symphysis pubis after the administration of 57 mL  Isovue-370 intravenous contrast. Radiation dose for this scan was  reduced using automated exposure control, adjustment of the mA and/or  kV according to patient size, or iterative reconstruction technique.    FINDINGS:   Chest: Mass in the left upper lobe now measures 7.6 x 5.7 cm,  previously 7.1 x 5.2 cm when measured in a similar manner. Trace  pleural fluid on the left. No right pleural or pericardial effusion.  No discrete adenopathy by size criteria. A few small mediastinal lymph  nodes are noted. There  are mild atherosclerotic changes of the  visualized aorta and its branches. There is no evidence of aortic  dissection or aneurysm. Normal heart size.    Abdomen and pelvis: The liver, spleen, adrenal glands, kidneys, and  pancreas demonstrate no worrisome focal lesion. There are no dilated  loops of small intestine or large bowel to suggest ileus or  obstruction. 3.5 cm infrarenal abdominal aortic aneurysm. No free  fluid. No free air. There are no lymph nodes that are abnormal by size  criteria.     Survey of the visualized bony structures demonstrates no destructive  bony lesions.      Impression    IMPRESSION: Slight interval enlargement in mass in the left upper lobe  since the comparison study, no new lesions.    RASHAAD GATES MD       ECOG PS: 3     ASSESSMENT:  This is a 78-year-old lady with squamous cell carcinoma, which appears to have originated from the main stem bronchus.  There is evidence of a pericardium-based mass and pericardial effusion.  There was a concern for intraluminal extension into the superior vena cava and a tumor deposit between the ascending aorta and superior vena cava. Her case was reviewed in our multidisciplinary Thoracic Oncology conference on 11/13/2018 with a concern for M1 disease discussed; the consensus was to approach with a curative intent starting with chemotherapy and followed by assessment of response and doing sequential radiation to her chest as well.  We planned to complete 2-3 cycles of chemotherapy followed by a PET/CT scan.      11/20/18 she started carboplatin and Taxol. She had 1 cycle. She was hospitalized from 12/5-12/8/18 for failure to thrive and pneumonia. CT PE protocol was performed on 12/5/18, which was negative for DVT, but did show progressive disease with enlargement of the left upper lobe mass. She was hospitalized from 12/16-12/24/18 with an upper GI bleed. On 12/18/18 EGD, she was found to have a duodenal ulcer and a proximal gastroduodenal artery  aneurysm, which was treated with coil embolization.     We discussed about immunotherapy. Specimen was insufficient for PD-L1 testing.     - Her performance status has declined further. The CT scan findings consistent with a disease progression in left upper lobe. I think in someone with reasonable performance status we can proceed with systemic therapy. I think that it is unlikely that we will be able to get meaningful palliation of his symptoms or prolongation of his survival with any form of cancer directed therapy. This was discussed in detail with the patient. I recommended hospice and discussed the role of this service.   She wants to think about it and will contact us after making a decision.     PLAN:   1.  Check ambulatrory oxygen saturation, if below 90% then will arrange for home O2.  2.  Patient to make decision about further treatment and will call us.     REUBEN JOAQUIN MD    1/22/2019     cc:   Hesham Graham MD   [facility] Thor[facility]    [address] [place], [place] [id number]      Donita Pina MD   [facility] Radiation On[facility]    [address] [place] [id number]

## 2019-01-22 NOTE — PATIENT INSTRUCTIONS
1.  Check ambulatrory oxygen saturation, if below 90% then will arrange for home O2.  2. Patient to make decision about further treatment and will call us.

## 2019-01-22 NOTE — LETTER
1/22/2019       RE: Barbie Deluca  [address] [facility] [address]     Dear Colleague,    Thank you for referring your patient, Barbie Deluca, to the [facility]. Please see a copy of my visit note below.    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  CT scan 10/24/2018:  A cystic mass involving the left side of the mediastinum extending from the left hilum towards the cardiac apex, abutting the pericardium over a long extent peripherally.  Enhancing soft tissue element as well surrounding atelectatic lingula.  MRA brain 10/31/2018, no brain metastases.  PET/CT scan 11/13/2018 and CT-guided biopsy 11/01/2018 consistent with squamous cell carcinoma.      TREATMENT:  Initial plan of neoadjuvant treatment with carboplatin and Taxol, complete 2-3 cycles followed by a CT scan and a plan for sequential radiation followed by maintenance immune therapy.  She had multiple serious medical issues after her first cycle of chemotherapy including a stroke. Her vision was partially impaired. She declined further chemotherapy.      SUBJECTIVE:  The patient was seen as a followup today. Her vision has continued to decline. She has been dyspnic and is not ambulating much. Extremely fatigued.     REVIEW OF SYSTEMS:  A complete review of systems was performed and found to be negative other than pertinent positives mentioned in history of present illness.      Past medical, social histories reviewed.     Meds- Reviewed.     PHYSICAL EXAMINATION:   VITAL SIGNS: /86   Pulse 66   Temp 98  F (36.7  C) (Oral)   Wt 49.8 kg (109 lb 11.2 oz)   SpO2 92%   BMI 20.06 kg/m     CONSTITUTIONAL: Appears tired  HEENT: Pupils are equal. Oropharynx is clear.   NECK: No cervical or supraclavicular lymphadenopathy.   RESPIRATORY: Clear bilaterally.   CARD/VASC: S1, S2, regular.    GI: Soft, nontender, nondistended, no hepatosplenomegaly.   MUSKULOSKELETAL: Warm, well perfused.   NEUROLOGIC: Alert, awake.   INTEGUMENT: No rash.   LYMPHATICS: No edema.   PSYCH: Anxious     LABORATORY DATA AND IMAGING REVIEWED DURING THIS VISIT:  Recent Labs   Lab Test 01/16/19  1059 01/09/19  1324 01/07/19  0750  12/08/18  0558  12/07/18  0557    135* 135   < > 132*   < > 129*   POTASSIUM 3.2* 4.3 4.1   < > 4.6  --  4.4   CHLORIDE 92* 97* 100   < > 96  --  97   CO2 34* 27 26   < > 28  --  25   ANIONGAP 7 11 9   < > 7  --  7   BUN 15 14 11   < > 10  --  10   CR 0.71 0.77 0.60   < > 0.61  --  0.56   * 226* 96   < > 105*  --  174*   PANTERA 8.8 9.3 8.8   < > 8.3*  --  7.5*   MAG  --   --   --   --  1.8  --  2.0   PHOS  --   --  4.2  --  3.3  --  2.2*    < > = values in this interval not displayed.     Recent Labs   Lab Test 01/16/19  1059 01/07/19  0635 01/01/19  0805   WBC 9.0 8.4 7.4   HGB 10.3* 9.8* 10.3*    510* 472*   MCV 91 93 92   NEUTROPHIL 75.9 81.4 69.6     Recent Labs   Lab Test 01/16/19  1059 01/07/19  0750 12/28/18  1305 12/12/18  0925   BILITOTAL 0.6  --  0.2 0.4   ALKPHOS 143  --  100 129   ALT 19  --  27 44   AST 16  --  33 44   ALBUMIN 2.7* 2.7* 2.3* 2.4*         Results for orders placed or performed during the hospital encounter of 01/21/19   CT Chest/Abdomen/Pelvis w Contrast    Narrative    CT CHEST, ABDOMEN, AND PELVIS WITH CONTRAST 1/21/2019 8:31 AM     HISTORY: Enlarged lymph nodes, pelvic/inguinal. Follow up non-small  cell cancer of left lung (H).    COMPARISON: December 5, 2018    TECHNIQUE: Volumetric helical acquisition of CT images from the lung  apices through the symphysis pubis after the administration of 57 mL  Isovue-370 intravenous contrast. Radiation dose for this scan was  reduced using automated exposure control, adjustment of the mA and/or  kV according to patient size, or iterative reconstruction technique.    FINDINGS:   Chest: Mass in the left upper lobe  now measures 7.6 x 5.7 cm,  previously 7.1 x 5.2 cm when measured in a similar manner. Trace  pleural fluid on the left. No right pleural or pericardial effusion.  No discrete adenopathy by size criteria. A few small mediastinal lymph  nodes are noted. There are mild atherosclerotic changes of the  visualized aorta and its branches. There is no evidence of aortic  dissection or aneurysm. Normal heart size.    Abdomen and pelvis: The liver, spleen, adrenal glands, kidneys, and  pancreas demonstrate no worrisome focal lesion. There are no dilated  loops of small intestine or large bowel to suggest ileus or  obstruction. 3.5 cm infrarenal abdominal aortic aneurysm. No free  fluid. No free air. There are no lymph nodes that are abnormal by size  criteria.     Survey of the visualized bony structures demonstrates no destructive  bony lesions.      Impression    IMPRESSION: Slight interval enlargement in mass in the left upper lobe  since the comparison study, no new lesions.    RASHAAD GATES MD       ECOG PS: 3     ASSESSMENT:  This is a 78-year-old lady with squamous cell carcinoma, which appears to have originated from the main stem bronchus.  There is evidence of a pericardium-based mass and pericardial effusion.  There was a concern for intraluminal extension into the superior vena cava and a tumor deposit between the ascending aorta and superior vena cava. Her case was reviewed in our multidisciplinary Thoracic Oncology conference on 11/13/2018 with a concern for M1 disease discussed; the consensus was to approach with a curative intent starting with chemotherapy and followed by assessment of response and doing sequential radiation to her chest as well.  We planned to complete 2-3 cycles of chemotherapy followed by a PET/CT scan.      11/20/18 she started carboplatin and Taxol. She had 1 cycle. She was hospitalized from 12/5-12/8/18 for failure to thrive and pneumonia. CT PE protocol was performed on 12/5/18, which  was negative for DVT, but did show progressive disease with enlargement of the left upper lobe mass. She was hospitalized from 12/16-12/24/18 with an upper GI bleed. 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     We discussed about immunotherapy. Specimen was insufficient for PD-L1 testing.     - Her performance status has declined further. The CT scan findings consistent with a disease progression in left upper lobe. I think in someone with reasonable performance status we can proceed with systemic therapy. I think that it is unlikely that we will be able to get meaningful palliation of his symptoms or prolongation of his survival with any form of cancer directed therapy. This was discussed in detail with the patient. I recommended hospice and discussed the role of this service.   She wants to think about it and will contact us after making a decision.     PLAN:   1.  Check ambulatrory oxygen saturation, if below 90% then will arrange for home O2.  2.  Patient to make decision about further treatment and will call us.     TG SOLARES MD    1/22/2019     Again, thank you for allowing me to participate in the care of your patient.      Sincerely,    Tg Solares MD      cc:   Hesham Graham MD   [facility] Thoracic Surgery    [address] [place] [address]      Donita Pina MD   [facility] Radiation Oncology    [address] [place], [place] [address]

## 2019-01-22 NOTE — NURSING NOTE
"Oncology Rooming Note    January 22, 2019 9:00 AM   Barbie Deluca is a 78 year old female who presents for:    Chief Complaint   Patient presents with     Oncology Clinic Visit     Return; Mediastinal Mass     Initial Vitals: /86   Pulse 66   Temp 98  F (36.7  C) (Oral)   Wt 49.8 kg (109 lb 11.2 oz)   SpO2 92%   BMI 20.06 kg/m   Estimated body mass index is 20.06 kg/m  as calculated from the following:    Height as of 11/28/18: 1.575 m (5' 2\").    Weight as of this encounter: 49.8 kg (109 lb 11.2 oz). Body surface area is 1.48 meters squared.  Mild Pain (2) Comment: Left Shoulder and ribs   No LMP recorded. Patient is postmenopausal.  Allergies reviewed: Yes  Medications reviewed: Yes    Medications: Medication refills not needed today.  Pharmacy name entered into [employer]: [facility] [address] [place] [address] [address] [facility] [address]    Clinical concerns: Patient requests refill of Oxycontin today. She also has concerns about her oxygen levels, particularly at night, and wonders about supplementation. Patient states her eye sight is continuing to worsen since her mini strokes. Patient is also experiencing lightheadedness today, upon standing. Patient appears very weak and tired. Beck was notified.    10 minutes for nursing intake (face to face time)     Torri Vargas MA              "

## 2019-01-23 NOTE — PROGRESS NOTES
Per Dr. Solares, pt needs a walk test to see if she de-saturates in order to get home oxygen. Pt's daughter, Izzy, reported that home health noted pt was at 90% on room air during their last visit. Izzy also reported that when pt was in the hospital that she was on O2 and when she pulled it off at night she would de-sat down into the 70's but no order was placed for home O2. Pt was unable to complete a six minute walk test in clinic, and remained at 93-96% during the period of activity she did tolerate. Izzy stated that pt's family is willing to pay out of pocket for home O2. Told Izzy writer will contact home O2 company and find out what their options are.     TC to Chelsey who stated that pt may have an overnight oximetry test if nighttime is when pt is experiencing problems. Order placed and faxed to Chelsey, attn: Oralia at [phone] with instructions to call Izzy to schedule visit. Izzy notified of plan and to expect a call from Chelsey.

## 2019-01-31 NOTE — PROGRESS NOTES
TC to pt's daughter, Izzy, to get an update on pt's status and if she's decided to enroll in hospice as we currently don't have appts set up for her. Also requested an update on if pt was set up with home O2 and if they need any further assistance with this. Writer stated understanding that Izzy can't take calls at work but requested she call writer back and leave a detailed voice mail if it is after business hours when she calls. Await response.

## 2019-04-25 ENCOUNTER — HOME CARE/HOSPICE - HEALTHEAST (OUTPATIENT)
Dept: HOSPICE | Facility: HOSPICE | Age: 79
End: 2019-04-25

## 2019-04-25 ENCOUNTER — AMBULATORY - HEALTHEAST (OUTPATIENT)
Dept: HOSPICE | Facility: HOSPICE | Age: 79
End: 2019-04-25

## 2019-05-14 ENCOUNTER — COMMUNICATION - HEALTHEAST (OUTPATIENT)
Dept: FAMILY MEDICINE | Facility: CLINIC | Age: 79
End: 2019-05-14

## 2019-05-14 DIAGNOSIS — I10 ESSENTIAL HYPERTENSION, BENIGN: ICD-10-CM

## 2019-08-30 LAB
LAB AP CHARGES (HE HISTORICAL CONVERSION): ABNORMAL
LAB AP INITIAL CYTO EVAL (HE HISTORICAL CONVERSION): ABNORMAL
LAB MED GENERAL PATH INTERP (HE HISTORICAL CONVERSION): ABNORMAL
PATH REPORT.ADDENDUM SPEC: ABNORMAL
PATH REPORT.COMMENTS IMP SPEC: ABNORMAL
PATH REPORT.COMMENTS IMP SPEC: ABNORMAL
PATH REPORT.FINAL DX SPEC: ABNORMAL
PATH REPORT.MICROSCOPIC SPEC OTHER STN: ABNORMAL
PATH REPORT.RELEVANT HX SPEC: ABNORMAL
SPECIMEN DESCRIPTION: ABNORMAL

## 2021-05-26 NOTE — TELEPHONE ENCOUNTER
[facility] hospice nurse, Opal, calling to ask if Dr Reaves is willing to follow patient through hospice as PCP.     Dr Reaves is okay with this. Opal notified. No further action necessary at this time.     H.DeGree, CMA

## 2021-06-01 VITALS — WEIGHT: 142 LBS | BODY MASS INDEX: 25.97 KG/M2

## 2021-06-01 VITALS — BODY MASS INDEX: 26.34 KG/M2 | WEIGHT: 144 LBS

## 2021-06-02 VITALS — BODY MASS INDEX: 23.55 KG/M2 | WEIGHT: 128 LBS | HEIGHT: 62 IN

## 2021-06-02 VITALS — BODY MASS INDEX: 19.66 KG/M2 | WEIGHT: 107.5 LBS

## 2021-06-02 VITALS — BODY MASS INDEX: 23.83 KG/M2 | WEIGHT: 130.31 LBS

## 2021-06-02 VITALS — WEIGHT: 112.31 LBS | BODY MASS INDEX: 21.22 KG/M2

## 2021-06-02 VITALS — WEIGHT: 128.3 LBS | HEIGHT: 62 IN | BODY MASS INDEX: 23.61 KG/M2

## 2021-06-02 VITALS — WEIGHT: 114.13 LBS | BODY MASS INDEX: 20.87 KG/M2

## 2021-06-02 VITALS — WEIGHT: 109 LBS | HEIGHT: 62 IN | BODY MASS INDEX: 20.06 KG/M2

## 2021-06-02 VITALS — WEIGHT: 115.1 LBS | BODY MASS INDEX: 21.73 KG/M2 | HEIGHT: 61 IN

## 2021-06-02 VITALS — BODY MASS INDEX: 22.92 KG/M2 | WEIGHT: 125.31 LBS

## 2021-06-02 VITALS — WEIGHT: 108.06 LBS | BODY MASS INDEX: 19.76 KG/M2

## 2021-06-02 VITALS — WEIGHT: 121.56 LBS | BODY MASS INDEX: 22.23 KG/M2

## 2021-06-02 VITALS — WEIGHT: 109 LBS | BODY MASS INDEX: 19.94 KG/M2

## 2021-06-16 NOTE — TELEPHONE ENCOUNTER
Telephone Encounter by Anca Sullivan at 3/25/2019 11:22 AM     Author: Anca Sullivan Service: -- Author Type: --    Filed: 3/25/2019 11:32 AM Encounter Date: 3/25/2019 Status: Addendum    : Anca Sullivan    Related Notes: Original Note by Anca Sullivan filed at 3/25/2019 11:29 AM       Is patient currently in hospice?  If so this will need to be directed to Hospice Care Team as it will go through her Part A benefits.

## 2021-06-18 NOTE — LETTER
Letter by Erik Reaves MD at      Author: Erik Reaves MD Service: -- Author Type: --    Filed:  Encounter Date: 2/12/2019 Status: (Other)       Re: Barbie Deluca  YOB: 1940    EVELIN Bolton    Thank you so much for seeing Mrs. Deluca in your clinic for visual disturbance.  She was very pleased with her visit with you and is looking forward to some correction for her vision.    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  You had questions about the need for evaluation of her carotid arteries.  At the time of her cerebrovascular accident she had both a CTA and a MRA of the neck vessels and she did have evidence for a 50% stenosis in the right carotid artery and on the MRA there was a suggestion of a shallow ulcer associated with this plaque.    Because of her other advanced medical problems with advanced cancer and GI bleeding complications it was decided that she was not a candidate for more aggressive treatment of her carotid artery disease.  She had been on dual therapy including aspirin and warfarin and had a significant GI bleed related to a peptic ulcer. The warfarin was discontinued.  She has continued on aspirin daily.    The patient is certainly very aware that she may have further signs and symptoms of ischemic events but at this point is looking at more quality of life and comfort measures.  One of the things that she is really looking forward to is being able to see better so that she can read, which is something she gets a lot of enjoyment from.    I appreciate your help with our mutual patient and if I can help you in any other way please do not hesitate to contact me.        Best regards,          Erik Reaves MD  [facility]  [phone]

## 2021-06-18 NOTE — LETTER
Letter by Erik Reaves MD at      Author: Erik Reaves MD Service: -- Author Type: --    Filed:  Encounter Date: 1/24/2019 Status: (Other)       Barbie Deluca  [address] [address]             January 24, 2019         Dear Ms. Deluca,    Below are the results from your recent visit:    Resulted Orders   Potassium   Result Value Ref Range    Potassium 3.8 3.5 - 5.0 mmol/L       I am happy to see that your potassium is normal now so you are doing a good job with dietary changes to improve that.  Thus I do not think we have to start a potassium supplement.    Just so you are aware I did place an order for home oxygen yesterday as I did get the actual results from physical therapy and it actually was when you were walking and doing your therapy that the therapist noticed that your oxygen level went down to 80 and you were noticeably out of breath and it took about a minute for the oxygen to come back up to 90.  I also did place a request for a wheelchair.    Please call with questions or contact us using [facility].    Sincerely,        Electronically signed by Erik Reaves MD

## 2021-06-19 NOTE — PROGRESS NOTES
PROGRESS NOTE   7/9/2018    SUBJECTIVE:  Barbie Deluca is a 77 y.o. female  who presents for   Chief Complaint   Patient presents with     Hypertension     Patient comes in today for recheck of her hypertension.  I saw her on 6/25/2018 after having not seen her for over 2 years.  She had been off of her antihypertensive medications for about that same amount of time.  Her blood pressure was significantly elevated when she came in 2 weeks ago we started her back on her medications that she had been on in the past.  We restarted the lisinopril and then I did labs.  Her lab work suggested that her potassium was quite high and therefore we added the hydrochlorothiazide back into her regimen as well.  She has been taking the lisinopril any milligrams in addition to hydrochlorothiazide 25 mg about the last week or so.  She has been tolerating that medication without problems.  She denies that she has any chest pain or shortness of breath or swelling of her extremities.  Her blood pressure today looks excellent at 138/74.  She is feeling well and is quite pleased that she is back on her medications and is doing so much better.  When I checked her lab work a couple of weeks ago her potassium was 5.2.  When we rechecked it was 5.6 and this will be added the hydrochlorothiazide.  We will recheck electrolytes today again.  She notes that she is feeling so much better because she is now concentrating on herself.  She had been up in [facility] taking care of her grandson but now she is back here and she is trying to really focus on herself and making sure that she is healthy.  She is feeling very good about the fact that she is actually doing that.    Patient Active Problem List   Diagnosis     Hypertension       Current Outpatient Prescriptions   Medication Sig Dispense Refill     hydroCHLOROthiazide (HYDRODIURIL) 25 MG tablet Take 1 tablet (25 mg total) by mouth daily. 60 tablet 0     lisinopril  (PRINIVIL,ZESTRIL) 20 MG tablet Take 1 tablet (20 mg total) by mouth daily. 60 tablet 0     No current facility-administered medications for this visit.        No Known Allergies    Past Medical History:   Diagnosis Date     Hypertension      Miscarriage     x2     Normal delivery     x5       Past Surgical History:   Procedure Laterality Date     CHOLECYSTECTOMY  age 36       History   Smoking Status     Current Every Day Smoker     Packs/day: 0.75     Years: 55.00   Smokeless Tobacco     Never Used       OBJECTIVE:     /74 (Patient Site: Left Arm, Patient Position: Sitting, Cuff Size: Adult Regular)  Pulse 88  Wt 142 lb (64.4 kg)  SpO2 96%  Breastfeeding? No  BMI 25.97 kg/m2    Physical Exam:  GENERAL APPEARANCE: A&A, NAD, well hydrated, well nourished  SKIN:  Normal skin turgor, no lesions/rashes   CV: RRR, no M/G/R   LUNGS: CTAB  EXTREMITY: no swelling noted.  Full range of motion of all 4 extremities.   NEURO: no gross deficits   PSYCHIATRIC;  Mood appropriate, memory intact    LABS:     Recent Results (from the past 240 hour(s))   Comprehensive Metabolic Panel   Result Value Ref Range    Sodium 136 136 - 145 mmol/L    Potassium 5.3 (H) 3.5 - 5.0 mmol/L    Chloride 99 98 - 107 mmol/L    CO2 27 22 - 31 mmol/L    Anion Gap, Calculation 10 5 - 18 mmol/L    Glucose 91 70 - 125 mg/dL    BUN 29 (H) 8 - 28 mg/dL    Creatinine 0.74 0.60 - 1.10 mg/dL    GFR MDRD Af Amer >60 >60 mL/min/1.73m2    GFR MDRD Non Af Amer >60 >60 mL/min/1.73m2    Bilirubin, Total 0.2 0.0 - 1.0 mg/dL    Calcium 10.1 8.5 - 10.5 mg/dL    Protein, Total 7.3 6.0 - 8.0 g/dL    Albumin 4.0 3.5 - 5.0 g/dL    Alkaline Phosphatase 65 45 - 120 U/L    AST 17 0 - 40 U/L    ALT 20 0 - 45 U/L       ASSESSMENT/PLAN:     Hypertension [I10]      1. Hypertension  - Comprehensive Metabolic Panel    2. Hyperkalemia  - Comprehensive Metabolic Panel    Patient overall seems to be doing well.  She is feeling well and is having no concerns or complaints  today.  Her blood pressure today looks excellent at 138/74.  She has now been on her medications of lisinopril and hydrochlorothiazide for about the last week or so.  She has been on lisinopril now for about 3 weeks.  Will recheck electrolytes today as she had had a history of elevation in her potassium.  If the electrolytes look fine I would like to see her back in about 3 months for recheck of her hypertension.  She has had a nice response to these medications in terms of her blood pressure management.  She was doing well on these medications before she was lost to follow-up 2 to years ago as well.  She was congratulated on getting back in and managing her health.  I will contact her with results of the lab work that was done today and if any medication changes need to be done.  When she needs a refill of her medication she certainly should let me know.  As long as things continue to go well we will see her back in about 3 months for follow-up.  Patient is well aware of the need to follow-up on a regular basis and to manage her blood pressure issues.  If she has additional questions or concerns will certainly let me know.  Nora Lees MD

## 2021-06-19 NOTE — PROGRESS NOTES
PROGRESS NOTE   6/25/2018    SUBJECTIVE:  Barbie Deluca is a 77 y.o. female  who presents for   Chief Complaint   Patient presents with     Medication Management     b/p check, nonfasting     Patient comes in today for hypertension.  She has not been seen since March 2016.  She was treated at that time with lisinopril for her blood pressure.  She had been on hydrochlorothiazide in addition to lisinopril but we had to stop the hydrochlorothiazide before she failed to follow-up.  She notes that she took the lisinopril until she ran out of it which was about 2 years ago.  She has not been on any any medication since then.  She has been busy taking care of her grandson in [place] who at age 8 got shot in the head.  She now is back here and presents for follow-up of her own medical issues.  She feels overall like things are doing well.  She is not having any side effects of high blood pressure.  She denies that she is having any chest pain or shortness of breath or swelling.  She continues to smoke and we talked about the fact that she needs to stop smoking.    Patient Active Problem List   Diagnosis     Hypertension       Current Outpatient Prescriptions   Medication Sig Dispense Refill     lisinopril (PRINIVIL,ZESTRIL) 20 MG tablet Take 1 tablet (20 mg total) by mouth daily. 60 tablet 0     No current facility-administered medications for this visit.        No Known Allergies    Past Medical History:   Diagnosis Date     Hypertension      Miscarriage     x2     Normal delivery     x5       Past Surgical History:   Procedure Laterality Date     CHOLECYSTECTOMY  age 36       History   Smoking Status     Current Every Day Smoker     Packs/day: 0.75     Years: 55.00   Smokeless Tobacco     Never Used       OBJECTIVE:     BP (!) 160/92  Pulse 96  Resp 22  Wt 144 lb (65.3 kg)  SpO2 94%  BMI 26.34 kg/m2    Physical Exam:  GENERAL APPEARANCE: A&A, NAD, well hydrated, well nourished  SKIN:  Normal skin  turgor, no lesions/rashes   CV: RRR, no M/G/R   LUNGS: CTAB  EXTREMITY: no swelling noted.  Full range of motion of all 4 extremities.   NEURO: no gross deficits   PSYCHIATRIC;  Mood appropriate, memory intact    LABS:     Recent Results (from the past 240 hour(s))   Lipid Cascade   Result Value Ref Range    Cholesterol 207 (H) <=199 mg/dL    Triglycerides 100 <=149 mg/dL    HDL Cholesterol 55 >=50 mg/dL    LDL Calculated 132 (H) <=129 mg/dL    Patient Fasting > 8hrs? No    Comprehensive Metabolic Panel   Result Value Ref Range    Sodium 139 136 - 145 mmol/L    Potassium 5.2 (H) 3.5 - 5.0 mmol/L    Chloride 101 98 - 107 mmol/L    CO2 28 22 - 31 mmol/L    Anion Gap, Calculation 10 5 - 18 mmol/L    Glucose 87 70 - 125 mg/dL    BUN 22 8 - 28 mg/dL    Creatinine 0.70 0.60 - 1.10 mg/dL    GFR MDRD Af Amer >60 >60 mL/min/1.73m2    GFR MDRD Non Af Amer >60 >60 mL/min/1.73m2    Bilirubin, Total 0.3 0.0 - 1.0 mg/dL    Calcium 9.9 8.5 - 10.5 mg/dL    Protein, Total 7.3 6.0 - 8.0 g/dL    Albumin 3.9 3.5 - 5.0 g/dL    Alkaline Phosphatase 66 45 - 120 U/L    AST 18 0 - 40 U/L    ALT 19 0 - 45 U/L       ASSESSMENT/PLAN:     Hypertension [I10]      1. Hypertension  - Lipid Cascade  - Comprehensive Metabolic Panel  - lisinopril (PRINIVIL,ZESTRIL) 20 MG tablet; Take 1 tablet (20 mg total) by mouth daily.  Dispense: 60 tablet; Refill: 0    2. Tobacco use disorder    Patient overall seems to be doing okay.  Her blood pressure initially today was elevated at 166/94.  On recheck was 160/90.  I then did check her blood pressure again while I was in the room and it was 160/92.  She is feeling well and really has no concerns today.  She would very much like to get back on the medication for her high blood pressure.  I am going to go ahead and draw lipid panel today as well as electrolytes.  We will restart her on lisinopril 20 mg which is what she was on in the past.  This has worked well to control her blood pressure in the past I am  hopeful that it will help control her blood pressure again.  When I saw her 2-1/2 years ago her blood pressure was even more elevated than it is now and the lisinopril did an excellent job of bringing it down so I am hoping that will be the case again.  I did give her prescription for 60 day supply but I have asked her to return to clinic in 2 weeks for recheck of her blood pressure.  If she has additional problems or concerns or has any new symptoms will certainly let me know.  I will contact her with results of the lab work when it returns.  We did also discuss how important is that she needs to stop smoking especially given the fact that she has hypertension.  She voiced understanding of that and is working on trying to quit smoking.  She does not desire any assistance from me today but when she does in the future will certainly let me know.  All of her questions were answered today.  She has additional questions or concerns will certainly let me know.  Otherwise we will see her back in 2 weeks for recheck of her blood pressure.  Nora Lees MD

## 2021-06-19 NOTE — PROGRESS NOTES
PROGRESS NOTE   8/8/2018    SUBJECTIVE:  Barbie Deluca is a 77 y.o. female  who presents for   Chief Complaint   Patient presents with     Follow-up     Patient comes in today for follow-up of her hypertension.  She is currently taking hydrochlorothiazide as well as lisinopril for her blood pressure.  She came in to see me at the end of June in regards to her blood pressure.  I had seen her about 2 years ago but then she had been lost to follow up due to the fact that [place] of her [place] for the last 2 years.  She then came back to [facility] and sought treatment at the end of June.  Her blood pressure at that time was elevated but not as elevated as it had been when she was off medication 2 years ago.  We started her on lisinopril and then her potassium increased and so we added the hydrochlorothiazide.  Then her sodium was low the last time she was in and so we have rearranged her medication again.  She is currently taking the hydrochlorothiazide 25 mg a day and then she is taking a half of a tablet of her lisinopril 20 mg a day as well.  She is tolerating this medication without a problem.  She is feeling well.  She denies that she is any chest pain or shortness of breath or other difficulties.  She had a significant tooth infection and a root canal and she had an antibiotic for those and she is still continuing on the antibiotic but she is finally starting to feel better.  She notes that her left shoulder has been bothering her since the tooth infection but that seems to be getting better now as well.  She notes she fell last year when she was trying to pull roots out of her garden and ever since then she has had a lot of shoulder pain.  She could not move her shoulder at all last fall but does seem to improve until now this most recent tooth infection.  She does not have any redness or warmth to the touch at all and she has full range of motion of the shoulder.  She does  note that she has been taking 6 or 7 aspirins a day because of the shoulder pain recently and we discussed that she absolutely cannot do that that is too many to take in a day.  She does need labs to be drawn today to make sure that her sodium is improving and her potassium remained stable in the normal range per    Patient Active Problem List   Diagnosis     Hypertension       Current Outpatient Prescriptions   Medication Sig Dispense Refill     hydroCHLOROthiazide (HYDRODIURIL) 25 MG tablet Take 1 tablet (25 mg total) by mouth daily. 60 tablet 0     lisinopril (PRINIVIL,ZESTRIL) 20 MG tablet Take 0.5 tablets (10 mg total) by mouth daily.       No current facility-administered medications for this visit.        No Known Allergies    Past Medical History:   Diagnosis Date     Hypertension      Miscarriage     x2     Normal delivery     x5       Past Surgical History:   Procedure Laterality Date     CHOLECYSTECTOMY  age 36       History   Smoking Status     Current Every Day Smoker     Packs/day: 0.75     Years: 55.00   Smokeless Tobacco     Never Used       OBJECTIVE:     /76 (Patient Site: Left Arm, Patient Position: Sitting, Cuff Size: Adult Regular)  Pulse 95  Wt 142 lb (64.4 kg)  SpO2 95%  Breastfeeding? No  BMI 25.97 kg/m2    Physical Exam:  GENERAL APPEARANCE: A&A, NAD, well hydrated, well nourished  SKIN:  Normal skin turgor, no lesions/rashes   CV: RRR, no M/G/R   LUNGS: CTAB  On exam of her left shoulder she has full range of motion of the shoulder without any problems.  There is no tenderness palpation there is no swelling there is no warmth to the touch.  EXTREMITY: no swelling noted.  Full range of motion of all 4 extremities.   NEURO: no gross deficits   PSYCHIATRIC;  Mood appropriate, memory intact    LABS:     Recent Results (from the past 240 hour(s))   Comprehensive Metabolic Panel   Result Value Ref Range    Sodium 132 (L) 136 - 145 mmol/L    Potassium 5.0 3.5 - 5.0 mmol/L    Chloride 96  (L) 98 - 107 mmol/L    CO2 24 22 - 31 mmol/L    Anion Gap, Calculation 12 5 - 18 mmol/L    Glucose 110 70 - 125 mg/dL    BUN 27 8 - 28 mg/dL    Creatinine 0.73 0.60 - 1.10 mg/dL    GFR MDRD Af Amer >60 >60 mL/min/1.73m2    GFR MDRD Non Af Amer >60 >60 mL/min/1.73m2    Bilirubin, Total 0.3 0.0 - 1.0 mg/dL    Calcium 10.2 8.5 - 10.5 mg/dL    Protein, Total 7.0 6.0 - 8.0 g/dL    Albumin 3.9 3.5 - 5.0 g/dL    Alkaline Phosphatase 61 45 - 120 U/L    AST 15 0 - 40 U/L    ALT 12 0 - 45 U/L       ASSESSMENT/PLAN:     Hypertension [I10]      1. Hypertension  - Comprehensive Metabolic Panel  - lisinopril (PRINIVIL,ZESTRIL) 20 MG tablet; Take 0.5 tablets (10 mg total) by mouth daily.    2. Left shoulder pain    3. Hyperkalemia  - Comprehensive Metabolic Panel    4. Hyponatremia  - Comprehensive Metabolic Panel    Patient overall seems to be doing okay.  She seems to be tolerating medication well.  We did spend a long time today discussing her medications and at this point she is taking a half a tablet of lisinopril which would be the equivalent 10 mg a day and then also taking hydrochlorothiazide 25 mg a day.  She was little confused as to which when she was taking a half a tablet of but after I explained what each of them did and 1 of them made her go to the bathroom all the time she is positive that she is taking hold hydrochlorothiazide a day and a half of the lisinopril a day.  Will recheck labs today she has had low sodium as well as high potassium since starting back on these medications but they seem to be improving.  Will contact her with results of the lab work when it returns.  I am hoping we can continue on the same medications as they are working well for her blood pressure.  Her blood pressure today is 132/76 which is excellent for her.  She does not need refills of either these medications today but when she does she certainly will let me know.  In terms of her left shoulder pain I offered that we certainly could  try some physical therapy but she declines that.  She also declines any kind of orthopedic workup at this time.  If she desires that in the future will certainly let me know.  We did discuss the fact that she absolutely cannot take 6-7 aspirin a day on a consistent and chronic basis.  She may take an occasional aspirin but certainly should not be taking that many on an every day basis.  She will stop taking the daily aspirin and will let me know if she has more difficulties or concerns or if she desires further workup with the orthopedist or the physical therapist.  All of her questions were answered today.  We will see her back in 3 months for follow-up as long as her labs look good.  Nora Lees MD

## 2021-06-19 NOTE — LETTER
Letter by Erik Reaves MD at      Author: Erik Reaves MD Service: -- Author Type: --    Filed:  Encounter Date: 3/20/2019 Status: (Other)           Dear Family of Barbie Deluca,    I would like to express my heartfelt sympathy to you and family members in regard to Barbie's recent death.  I count it a privilege to have been able to care for her over the last months.  Seeing her in the clinic always made my day.  Even when she was experiencing significant pain she always had a great smile and would have some kind words to express.  I will miss her very much.    I want to point out and appreciate the great care that the family gave to Barbie over the last months as she became more ill.  You need to be commended for the loving care that you provided.  I know that she will be missed by many. At this time if there is anything further that I can do to help you as of family please do not hesitate to contact me.    Again thank you for the opportunity to be involved in your mother's care and also the pleasure of getting to know the family.      With deepest sympathy,    Erik Reaves MD   [facility]  [phone]

## 2021-06-21 NOTE — PROCEDURES
POST PROCEDURE NOTE    Post procedure Diagnosis: Left upper lobe lung nodule    Technical Procedure: CT guided lung biopsy.    Findings: Left upper lobe nodule    Physician: Lester J Fahrner IV    EBL:  Minimal    Specimens Removed:  Five 20 gauge core biopsies    Complications:  Small left pneumothorax.

## 2021-06-21 NOTE — PROGRESS NOTES
Second attempt to reach patient for hospital discharge follow up.  No answer.  RN has made two unsuccessful attempts to reach patient.   Encounter closed.

## 2021-06-21 NOTE — PROGRESS NOTES
PROGRESS NOTE   10/16/2018    SUBJECTIVE:  Barbie Deluca is a 78 y.o. female  who presents for   Chief Complaint   Patient presents with     Dental Pain     PT HAD DENTAL WORK DONE THIS SUMMER, AND PT HAS BEEN SICK , NAUSEATED , CAN'T EAT , NOTHING TASTE GOOD, BURPING ALOT      Patient comes in today with her daughter because of generally not feeling well.  She notes that she had root canal done in August and ever since then has not been feeling well.  She has a very hard time explaining how she is feeling or what her problem as other than she just does not feel well.  She notes that she has been having problems all summer.  She was in to see me a couple of months ago and was complaining of some pain in her left arm.  She notes that the pain in her left arm got a lot worse after she had her root canal.  She notes that lately she has not been able to eat at all.  She notes that she can hardly drink anything at all because she instantly starts burping and then vomits phlegm up.  She notes that nothing tastes good.  She is only having bowel movements every few days which she associates to the fact that she is not eating at all.  She has not noticed any blood in her stool at all.  When I asked her how long it has been since she has been eating she notes that she has not been eating well for a long time.  Her daughter adds that she eats a little bit of soup but takes a couple of bites and then she is done.  The summer the used to be able to go out to eat and she read a little bit of something but now she is even eating less than that.  Even the summer her daughter notes that she was not eating her normal amounts.  She notes that she gets really nauseated after she eats anything.  Both her and her daughter agree that about 20 minutes after she ate anything even in the summer she did feel really weak and dizzy and nauseated and need to go lay down.  She would go to sleep and when she woke up she felt a little bit  "better however still not good.  She continues to urinate but she notes that that is because she continues on her water pills.  She has had a 14 pound weight loss since 8/8/2018 when she was here the last time.  She is not sleeping well at night because she just cannot get comfortable.  They are pretty sure that she is not sleeping any more than 4 hours a night.  She is so generally uncomfortable that that is what wakes her up.  She notes that she has left arm and shoulder pain that makes it impossible for her to get comfortable.  She does nap on and off throughout the day but she feels so tired and exhausted and miserable all the time.  Yesterday she ate to tablespoons of oatmeal she thinks but she is not positive.    Patient Active Problem List   Diagnosis     Hypertension     Hyponatremia       Current Outpatient Prescriptions   Medication Sig Dispense Refill     lisinopril (PRINIVIL,ZESTRIL) 10 MG tablet TK ONE T PO ONCE D  0     acetaminophen (TYLENOL) 500 MG tablet Take 1-2 tablets (500-1,000 mg total) by mouth every 6 (six) hours as needed.  0     aspirin 325 MG tablet Take 1 tablet (325 mg total) by mouth daily.  0     famotidine (PEPCID) 20 MG tablet Take 1 tablet (20 mg total) by mouth 2 (two) times a day.  0     ondansetron (ZOFRAN) 4 MG tablet Take 1 tablet (4 mg total) by mouth every 8 (eight) hours as needed for nausea. 10 tablet 0     No current facility-administered medications for this visit.        No Known Allergies    Past Medical History:   Diagnosis Date     Hypertension      Miscarriage     x2     Normal delivery     x5       Past Surgical History:   Procedure Laterality Date     CHOLECYSTECTOMY  age 36       History   Smoking Status     Current Every Day Smoker     Packs/day: 0.75     Years: 55.00   Smokeless Tobacco     Never Used       OBJECTIVE:     /62  Pulse (!) 106  Temp 97.6  F (36.4  C)  Ht 5' 2\" (1.575 m)  Wt 128 lb (58.1 kg)  SpO2 94%  Breastfeeding? No  BMI 23.41 " kg/m2    Physical Exam:  GENERAL APPEARANCE: A&A, NAD, nursing notes that she could not even walk back to the exam room and have the nurse take her blood pressure without having to lay down.  She immediately wanted to lay down when she got to the exam room even before the nurse had taken any vital signs at all.  She did seem to be short of breath by the time she got to the exam room as well.  She appears acutely ill.  SKIN:  Normal skin turgor, no lesions/rashes   HEENT: moist mucous membranes, no rhinorrhea, PERRLA, TM's clear bilaterally, Throat without significant erythema or exudate.   NECK: Supple, full ROM, no significant lymphadenopathy or thyromegaly  CV: RRR, no M/G/R   LUNGS: CTAB  ABDOMEN: S&NT, no masses or organomegaly, positive bowel sounds.  No specific tenderness is appreciated in her abdominal area.  EXTREMITY: no swelling noted.  Full range of motion of all 4 extremities.   NEURO: no gross deficits   PSYCHIATRIC;  Mood appropriate, memory intact      ASSESSMENT/PLAN:     Fatigue [R53.83]      1. Fatigue    2. Nausea and vomiting    3. Dehydration    4. Hypertension    Patient comes in today because she has been feeling well for months.  She is now not been able to eat for several months as well.  She is had a 14 pound weight loss in the past 2 months.  She notes that yesterday she had a couple of bites of oatmeal and that was it.  She is drinking some but very little as well.  She instantly gets a burping sensation or an opening sensation whenever she eats and she also feels tired and weak and nauseated.  She really is unable to even sit up for the exam today in our office.  Given the amount of symptoms that she is having and the fact that she is really been unable to eat or drink anything for days on and in addition to the weight loss that she is been experiencing and the fact that she was short of breath even walking to the exam room I do think she needs to be evaluated further in the emergency  room.  I was going to do lab work but since we are sending her to the emergency room will allow them to do the lab work.  I did call [facility] emergency room and let them know that she was on her way.  Oxygenation saturation here was 94%.  All of patient and her daughter's questions were answered today.  I explained to them that they should go to the emergency room where a workup will be completed and we will determine hopefully was causing her issues and then be able to better treat the symptoms.  Patient and her daughter will present to emergency room now for further evaluation.  Nora Lees MD

## 2021-06-21 NOTE — PROGRESS NOTES
ASSESSMENT/PLAN:       1. Need for vaccination    - Tdap vaccine,  8yo or older,  IM  - Varicella Zoster, Recombinant Vaccine IM  - Pneumococcal conjugate vaccine 13-valent 6wks-17yrs; >50yrs    2. Hyponatremia    - Sodium  - Adrenocorticotropic Hormone (ACTH)  The evaluation of the hyponatremia was consistent with syndrome of inappropriate ADH    3. Essential hypertension  We will have her stop lisinopril and begin Cozaar 50 mg daily    4. Pleural effusion    - CT Chest With Contrast; Future  The CT scan that was done in the hospital was to evaluate for pulmonary embolus and the radiologist suggested doing a multiphasic noncontrast and contrast CT of the chest or lung.    5. Mass of lung    - HM1(CBC and Differential)  - CT Chest With Contrast; Future  - HM1 (CBC with Diff)  The patient will likely need to have either a thoracentesis or a biopsy of the hilar mass either by bronchoscopy or CT directed biopsy.    6. Loss of weight    - HM1(CBC and Differential)  - CT Chest With Contrast; Future  - HM1 (CBC with Diff)  This is likely related to #5.    7. Cigarette nicotine dependence with nicotine-induced disorder  Prescribed nicotine patch 21 mg to be used daily      I have had an Advance Directives discussion with the patient.  I have counseled the patient for tobacco cessation and prescribed the patient a tobacco cessation medication and the follow up will occur  at the next visit.    Erik Reaves MD      PROGRESS NOTE   10/23/2018    SUBJECTIVE:  Barbie Deluca is a 78 y.o. female  who presents for   Chief Complaint   Patient presents with     Follow-up     INF 10/16-10/17- not feeling well       1. Need for vaccination  The patient would like to get caught up on her immunizations today although she does not want the influenza vaccine.    2. Hyponatremia  Recent hospitalization October 16 through the 17th with hyponatremia with workup that showed a left hilar mass and questionable effusion in the left  pleura.  The patient had a CT of her sinuses done which was negative and she had an echocardiogram done which showed a good ejection fraction with no pericardial effusion of significance.  The patient's hydrochlorothiazide was stopped and her sodium has improved.  It was down to 125.    3. Essential hypertension  The patient's blood pressure seems to be satisfactory off of the hydrochlorothiazide and she is on lisinopril but does not like the metallic taste that she thinks is related to the ACE inhibitor.  She like to change the blood pressure pill.    4. Pleural effusion  The patient complains of shortness of breath and has had about a 40 pound weight loss over the last 2 years.  She been having some dyspepsia with belching and early satiety.  She does not have any dysphasia.    5. Mass of lung  50-pack-year history of smoking currently smoking about three fourths of a pack of cigarettes a day.  Strong family history for cancer primarily colon cancer.    6. Loss of weight  Patient has a tendency towards some constipation and is not noticed any blood in her stools.  Just has very poor appetite.    7. Cigarette nicotine dependence with nicotine-induced disorder  Patient would like assistance to quit smoking.  Years ago she tried the nicotine gum.  Patient Active Problem List   Diagnosis     Hypertension     Hyponatremia       Current Outpatient Prescriptions   Medication Sig Dispense Refill     famotidine (PEPCID) 20 MG tablet Take 1 tablet (20 mg total) by mouth 2 (two) times a day.  0     ondansetron (ZOFRAN) 4 MG tablet Take 1 tablet (4 mg total) by mouth every 8 (eight) hours as needed for nausea. 10 tablet 0     acetaminophen (TYLENOL) 500 MG tablet Take 1-2 tablets (500-1,000 mg total) by mouth every 6 (six) hours as needed.  0     aspirin 325 MG tablet Take 1 tablet (325 mg total) by mouth daily.  0     losartan (COZAAR) 50 MG tablet Take 1 tablet (50 mg total) by mouth daily. 30 tablet 11     nicotine  (NICODERM CQ) 21 mg/24 hr Place 1 patch on the skin daily. 30 patch 1     No current facility-administered medications for this visit.        History   Smoking Status     Current Every Day Smoker     Packs/day: 0.75     Years: 55.00   Smokeless Tobacco     Never Used           OBJECTIVE:        Recent Results (from the past 240 hour(s))   ECG 12 lead nursing unit performed   Result Value Ref Range    SYSTOLIC BLOOD PRESSURE  mmHg    DIASTOLIC BLOOD PRESSURE  mmHg    VENTRICULAR RATE 93 BPM    ATRIAL RATE 93 BPM    P-R INTERVAL 150 ms    QRS DURATION 80 ms    Q-T INTERVAL 374 ms    QTC CALCULATION (BEZET) 465 ms    P Axis 75 degrees    R AXIS -7 degrees    T AXIS 30 degrees    MUSE DIAGNOSIS       Normal sinus rhythm with sinus arrhythmia  Low voltage QRS  Borderline ECG  When compared with ECG of 05-FEB-2016 11:12,  ST no longer depressed in Inferior leads  Nonspecific T wave abnormality has replaced inverted T waves in Inferior leads  Nonspecific T wave abnormality no longer evident in Lateral leads  Confirmed by PETER MIKE MD LOC:[facility] ([id number]) on 10/17/2018 4:28:07 PM     Basic Metabolic Panel   Result Value Ref Range    Sodium 125 (L) 136 - 145 mmol/L    Potassium 4.3 3.5 - 5.0 mmol/L    Chloride 87 (L) 98 - 107 mmol/L    CO2 24 22 - 31 mmol/L    Anion Gap, Calculation 14 5 - 18 mmol/L    Glucose 90 70 - 125 mg/dL    Calcium 9.4 8.5 - 10.5 mg/dL    BUN 18 8 - 28 mg/dL    Creatinine 0.84 0.60 - 1.10 mg/dL    GFR MDRD Af Amer >60 >60 mL/min/1.73m2    GFR MDRD Non Af Amer >60 >60 mL/min/1.73m2   Magnesium   Result Value Ref Range    Magnesium 2.3 1.8 - 2.6 mg/dL   Thyroid Cascade   Result Value Ref Range    TSH 0.89 0.30 - 5.00 uIU/mL   Hepatic Profile   Result Value Ref Range    Bilirubin, Total 0.4 0.0 - 1.0 mg/dL    Bilirubin, Direct 0.1 <=0.5 mg/dL    Protein, Total 7.5 6.0 - 8.0 g/dL    Albumin 3.7 3.5 - 5.0 g/dL    Alkaline Phosphatase 71 45 - 120 U/L    AST 20 0 - 40 U/L    ALT 11 0 - 45 U/L   Lipase    Result Value Ref Range    Lipase 18 0 - 52 U/L   HM1 (CBC with Diff)   Result Value Ref Range    WBC 10.7 4.0 - 11.0 thou/uL    RBC 4.58 3.80 - 5.40 mill/uL    Hemoglobin 13.5 12.0 - 16.0 g/dL    Hematocrit 39.5 35.0 - 47.0 %    MCV 86 80 - 100 fL    MCH 29.5 27.0 - 34.0 pg    MCHC 34.2 32.0 - 36.0 g/dL    RDW 12.1 11.0 - 14.5 %    Platelets 425 140 - 440 thou/uL    MPV 9.0 8.5 - 12.5 fL    Neutrophils % 77 (H) 50 - 70 %    Lymphocytes % 15 (L) 20 - 40 %    Monocytes % 7 2 - 10 %    Eosinophils % 1 0 - 6 %    Basophils % 0 0 - 2 %    Neutrophils Absolute 8.2 (H) 2.0 - 7.7 thou/uL    Lymphocytes Absolute 1.6 0.8 - 4.4 thou/uL    Monocytes Absolute 0.7 0.0 - 0.9 thou/uL    Eosinophils Absolute 0.1 0.0 - 0.4 thou/uL    Basophils Absolute 0.0 0.0 - 0.2 thou/uL   Urinalysis-UC if Indicated   Result Value Ref Range    Color, UA Yellow Colorless, Yellow, Straw, Light Yellow    Clarity, UA Clear Clear    Glucose, UA Negative Negative    Bilirubin, UA Negative Negative    Ketones,  mg/dL (!) Negative    Specific Gravity, UA 1.020 1.001 - 1.030    Blood, UA Negative Negative    pH, UA 5.0 4.5 - 8.0    Protein, UA 30 mg/dL (!) Negative mg/dL    Urobilinogen, UA 2.0 E.U./dL <2.0 E.U./dL, 2.0 E.U./dL    Nitrite, UA Negative Negative    Leukocytes, UA Negative Negative    Bacteria, UA None Seen None Seen hpf    RBC, UA 0-2 None Seen, 0-2 hpf    WBC, UA 0-5 None Seen, 0-5 hpf    Squam Epithel, UA 10-25 (!) None Seen, 0-5 lpf    Mucus, UA Few (!) None Seen lpf    Hyaline Casts, UA 25-50 (!) 0-5, None Seen lpf   Sodium, Random Urine   Result Value Ref Range    Sodium, Urine 31 mmol/L   Osmolality, Random, Urine   Result Value Ref Range    Osmolality, Urine 499 300 - 900 mOsm/kg   Osmolality   Result Value Ref Range    Osmolality, Blood 265 (L) 270 - 300 mOsm/kg   Cortisol   Result Value Ref Range    Cortisol 12.4 ug/dL   HM2(CBC w/o Differential)   Result Value Ref Range    WBC 9.2 4.0 - 11.0 thou/uL    RBC 4.01 3.80 - 5.40  mill/uL    Hemoglobin 11.8 (L) 12.0 - 16.0 g/dL    Hematocrit 35.3 35.0 - 47.0 %    MCV 88 80 - 100 fL    MCH 29.4 27.0 - 34.0 pg    MCHC 33.4 32.0 - 36.0 g/dL    RDW 12.1 11.0 - 14.5 %    Platelets 398 140 - 440 thou/uL    MPV 8.9 8.5 - 12.5 fL   Basic Metabolic Panel   Result Value Ref Range    Sodium 131 (L) 136 - 145 mmol/L    Potassium 4.1 3.5 - 5.0 mmol/L    Chloride 96 (L) 98 - 107 mmol/L    CO2 24 22 - 31 mmol/L    Anion Gap, Calculation 11 5 - 18 mmol/L    Glucose 67 (L) 70 - 125 mg/dL    Calcium 8.8 8.5 - 10.5 mg/dL    BUN 12 8 - 28 mg/dL    Creatinine 0.70 0.60 - 1.10 mg/dL    GFR MDRD Af Amer >60 >60 mL/min/1.73m2    GFR MDRD Non Af Amer >60 >60 mL/min/1.73m2   Echo Complete   Result Value Ref Range    LV volume diastolic 60.8 46 - 106 cm3    LV volume systolic 21.1 14 - 42 cm3    IVSd 1.16 0.6 - 0.9 cm    LVIDd 4.23 3.8 - 5.2 cm    LVIDs 2.68 2.2 - 3.5 cm    LVOT diam 1.8 cm    LVOT mean gradient 4 mmHg    LVOT peak VTI 24.2 cm    LVOT mean ryan 93.9 cm/s    LVOT peak ryan 143 cm/s    LVOT peak gradient 8 mmHg    LV PWd 1.03 0.6 - 0.9 cm    MV E' lat ryan 7.3 cm/s    MV E' med ryan 9.94 cm/s    AV mean ryan 126 cm/s    AV mean gradient 8 mmHg    AV VTI 32.8 cm    AV peak ryan 202 cm/s    AO root 3.3 cm    LA size 2.5 cm    LA/AO root ratio 0.758 no units    MV decel slope 3650 mm/s2    MV decel time 214 ms    MV P 1/2 time 63 ms    MV peak A ryan 97.3 cm/s    MV peak E ryan 78 cm/s    RVIDd 2 cm    TR peak ryan 281 cm/s    BSA 1.61 m2    Hieght 62 in    Weight 2060.8 lbs    /77 mmHg    HR 88 bpm    IVS/PW ratio 1.1     TR peak gradent 31.6 mmHg    LV FS 36.6 28 - 44 %    Echo LVEF calculated 65 55 - 75 %    LA volume 49.9 mL    LV mass 157.8 g    AV area 1.9 cm2    AV DIM IND ryan 0.7     MV area p 1/2 time 3.5 cm2    MV E/A Ratio 0.8     LVOT area 2.54 cm2    LVOT SV 61.6 cm3    AV peak gradient 16.3 mmHg    LV systolic volume index 13.1 11 - 31 cm3/m2    LV diastolic volume index 37.8 34 - 74 cm3/m2    LA  volume index 31.0 mL/m2    LV mass index 98.0 g/m2    LV SVi 38.2 ml/m2    TAPSE 1.8 cm    MV med E/e' ratio 7.8     MV lat E/e' ratio 10.7     LV CO 5.4 l/min    LV Ci 3.4 l/min/m2    LA area 2 15 cm2    LA area 1 18 cm2    Height 62.0 in    Weight 129 lbs    MV Avg E/e' Ratio 9.0 cm/s    LA length 4.6 cm    AV DIM IND VTI 0.7        Vitals:    10/23/18 1406   BP: 128/80   Patient Position: Sitting   Cuff Size: Adult Regular   Pulse: (!) 108   SpO2: 97%   Weight: 130 lb 5 oz (59.1 kg)     Weight: 130 lb 5 oz (59.1 kg)        Physical Exam:  GENERAL APPEARANCE: Very pleasant 78-year-old female, complaining of poor appetite and left-sided chest pain, well hydrated, well nourished  SKIN:  Normal skin turgor, no lesions/rashes   HEENT: moist mucous membranes, no rhinorrhea, no oral lesions, upper dentures  NECK: Normal without adenopathy or masses  CV: RRR, no M/G/R   LUNGS:  patient has symmetrically decreased breath sounds with decreased breath sounds a bit higher on the left lower lung field than the right.  ABDOMEN: S&NT, no masses or enlarged organs   EXTREMITY: no edema and full ROM of all joints  NEURO: no focal findings

## 2021-06-21 NOTE — PROGRESS NOTES
ASSESSMENT/PLAN:       1. Non-recurrent unilateral inguinal hernia without obstruction or gangrene  Explained to the patient the hernia which is benign and does not need to have treatment unless it becomes larger or more painful.    2. Mediastinal mass, left  Awaiting the cytology from the lung biopsy and then will make sure that she gets appropriate follow-up with either surgical person or medical oncology.  It does not appear that she would qualify for home oxygen at this point with her O2 saturation being 92-94% today in the office even after walking around the clinic.      3. Essential hypertension  No changes in her blood pressure medicine as a suspect her blood pressure is elevated this morning because she has not taken her blood pressure medicine.    4. Loss of weight  Explored various options for trying to get more calories and so that she does not continue to get more we can lose more weight.      The following low BMI interventions were performed this visit: weight gain advised, nutrition/feeding management and dietary management education, guidance, and counseling    Erik Reaves MD      PROGRESS NOTE   11/2/2018    SUBJECTIVE:  Barbie Deluca is a 78 y.o. female  who presents for   Chief Complaint   Patient presents with     Shortness of Breath      discuss oxygen machine for home, notes that the nicotein patch gave her hives      Mass     on legt side of pelvic area, can only feel it standing      1. Non-recurrent unilateral inguinal hernia without obstruction or gangrene  The patient this week noticed a lump in the right groin area that she would like to have checked.  It seems to come and go and is really not symptomatic although when she does push on it there is slight tenderness.    2. Mediastinal mass, left  Yesterday the patient had a CT-guided biopsy of a left mediastinal mass and the cytology is still pending.  While at the hospital I got a call and the nursing staff are concerned about  her oxygen level which got down to 88% and upon dismissal was just above 90%.  The patient felt it was related to being anxious and also she did have a small  pneumothorax.  Today the patient feels that her breathing is somewhat better.  The cytology is still pending.  The patient had a consultation with cardiovascular/thoracic surgery and she had the impression that once the biopsy results are back he was going to be back in touch with her to set up a follow-up with [facility]  I switch the patient from an ACE inhibitor to in our    3. Essential hypertension  I switch the patient from an ACE inhibitor to in our an ARB and she feels that there may be a slight less metallic taste in her mouth but still has a funny taste in her mouth and that affects her appetite and eating.  Her blood pressure is elevated today but she did not take her blood pressure medicine this morning.    4. Loss of weight  The patient continues to have poor appetite with early satiety.  Nothing sounds good to her and I note that over the last 10 days she has lost 5 pounds.  She has not had further vomiting and has a bowel movement that looks normal about every 3 days.  She is trying to drink good amounts of fluids.  She has tried protein shakes or drinks and does not tolerate those well and particularly does not like food or drinks that are sweet.  Of note the patient does not have any night sweats fevers or chills.    I reviewed the MRI of her brain and heart and there is no evidence for metastatic disease to the brain but there was evidence for this left mediastinal mass being attached to the pericardium.    Patient Active Problem List   Diagnosis     Hypertension     Hyponatremia     Non-recurrent unilateral inguinal hernia without obstruction or gangrene       Current Outpatient Prescriptions   Medication Sig Dispense Refill     acetaminophen (TYLENOL) 500 MG tablet Take 1-2 tablets (500-1,000 mg total) by mouth  every 6 (six) hours as needed.  0     losartan (COZAAR) 50 MG tablet Take 1 tablet (50 mg total) by mouth daily. 30 tablet 11     famotidine (PEPCID) 20 MG tablet Take 1 tablet (20 mg total) by mouth 2 (two) times a day. (Patient taking differently: Take 20 mg by mouth 2 (two) times a day as needed. )  0     ondansetron (ZOFRAN) 4 MG tablet Take 1 tablet (4 mg total) by mouth every 8 (eight) hours as needed for nausea. 10 tablet 0     No current facility-administered medications for this visit.        History   Smoking Status     Current Every Day Smoker     Packs/day: 0.75     Years: 55.00   Smokeless Tobacco     Never Used           OBJECTIVE:        Recent Results (from the past 240 hour(s))   Sodium   Result Value Ref Range    Sodium 136 136 - 145 mmol/L   Adrenocorticotropic Hormone (ACTH)   Result Value Ref Range    Adrenal Corticotropin 19 <47 pg/mL   HM1 (CBC with Diff)   Result Value Ref Range    WBC 9.9 4.0 - 11.0 thou/uL    RBC 4.39 3.80 - 5.40 mill/uL    Hemoglobin 13.5 12.0 - 16.0 g/dL    Hematocrit 38.9 35.0 - 47.0 %    MCV 89 80 - 100 fL    MCH 30.7 27.0 - 34.0 pg    MCHC 34.6 32.0 - 36.0 g/dL    RDW 12.7 11.0 - 14.5 %    Platelets 479 (H) 140 - 440 thou/uL    MPV 6.8 (L) 7.0 - 10.0 fL    Neutrophils % 74 (H) 50 - 70 %    Lymphocytes % 19 (L) 20 - 40 %    Monocytes % 5 2 - 10 %    Eosinophils % 2 0 - 6 %    Basophils % 1 0 - 2 %    Neutrophils Absolute 7.3 2.0 - 7.7 thou/uL    Lymphocytes Absolute 1.9 0.8 - 4.4 thou/uL    Monocytes Absolute 0.5 0.0 - 0.9 thou/uL    Eosinophils Absolute 0.2 0.0 - 0.4 thou/uL    Basophils Absolute 0.1 0.0 - 0.2 thou/uL   MR Myocardium With Without Contrast   Result Value Ref Range    LVIDd 4.6 3.5 - 6.0 cm    LV IVS 1.1 0.6 - 1.1 cm    LVIDs 2.3 2.1 - 4.0 cm    LV PW 0.9 0.6 - 1.1 cm    LV ESV 26.9 13 - 51 mL    LV EDV 72.3 52 - 141 mL    LV SV 45.40 51 - 133 mL    LV CO 4.38 2.8 - 8.8 l/min    LV mass 108.0 75 - 175 g    LV EDVi 45.67 47 - 92 mL/m2    LV ESVi 16.99 13  - 30 mL/m2    LV SVi 28.68 32 - 62 mL/m2    LV CI 2.8 1.7 - 4.2 l/min/m2    LV mass 68.22 70 - 113 g/m2    Height 1.575 m    Weight 58.200 kg    MR LVEF 62.79 %    LV mass 158.81 g    CT LVEF 85 %   Protime-INR   Result Value Ref Range    INR 0.99 0.90 - 1.10   Hemoglobin   Result Value Ref Range    Hemoglobin 12.8 12.0 - 16.0 g/dL   Platelet Count   Result Value Ref Range    Platelets 490 (H) 140 - 440 thou/uL       Vitals:    11/02/18 0946 11/02/18 0950   BP: 168/90 162/90   Patient Position: Sitting Sitting   Cuff Size: Adult Regular Adult Regular   Pulse: (!) 103    SpO2: 93%    Weight: 125 lb 5 oz (56.8 kg)      Weight: 125 lb 5 oz (56.8 kg)        Physical Exam:  GENERAL APPEARANCE: 78-year-old female here for a follow-up status post biopsy of a left mediastinal mass, thin appearing, pleasant no acute distress, note that O2 saturation when supine is 93%  SKIN:  Normal skin turgor, no lesions/rashes   HEENT: moist mucous membranes, no rhinorrhea  NECK: Normal without adenopathy or masses  CV: RRR, no M/G/R   LUNGS: CTAB  ABDOMEN: S&NT, no masses or enlarged organs, there is a reducible right inguinal hernia  EXTREMITY: no edema and full ROM of all joints  NEURO: no focal findings

## 2021-06-21 NOTE — PROGRESS NOTES
Patient was identified as a potential candidate for the Clinic Care Coordination program and has declined participating. I have reached out to this patient multiple times over the last year and the patient has continued to decline participation. I will discontinue outreach to the patient at this time. I have notified the patient s physician by task. If the provider feels this patient is a good fit in the future I have asked them to resend to the [facility] ([facility]3] referral bucket. I have also provided the patient with my contact information should they change their mind.       Order #: [id number] Procedure: AMB REFERRAL TO CARE MANAGEMENT (PRIMARY CARE) Order Type Outpatient Referral   Order Date: 10/22/2018 Proc Category: Outpatient Referral Orderables     Priority: Routine Class: Internal Referral     Standing Status: Normal Status: Sent [2]     Ordering User: Aleida Lopez RN Department: [facility]     Auth Provider: MIRANDA DIAZ Provider: Aleida Lopez RN     Diagnosis: Hyponatremia           Samantha Waters   [phone]  Clinic Care Coordinator

## 2021-06-21 NOTE — PROGRESS NOTES
Dr Reaves called and informed about pt O2 sats.  Dr Reaves will check into pt being able to get O2 paid for by the insurance. Dr Reaves said he would call the pt when he finds out if the pt can have O2 at home.

## 2021-06-21 NOTE — PROGRESS NOTES
Reviewed labs and CT of the lung and note that the patient's sodium is improved now into the normal range and her CBC is satisfactory.  The CT scan of the lung shows a complex cystic mass 7.2 cm that seems to originate from the left mediastinum or hilum and extends peripherally to abut near the apex of the heart.  Benign versus malignant mass unclear without tissue biopsy.  Explained this to the patient and proceed with a pulmonary consult as well as a cardiovascular/thoracic surgery consult to determine best approach to get tissue for biopsy.  From the radiology report they thought it would be risky to do a peripheral needle biopsy because of the mass and its position to major vascular structures.  Orders placed for pulmonary and cardiac surgery consults.

## 2021-06-21 NOTE — H&P (VIEW-ONLY)
ASSESSMENT/PLAN:       1. Need for vaccination    - Tdap vaccine,  8yo or older,  IM  - Varicella Zoster, Recombinant Vaccine IM  - Pneumococcal conjugate vaccine 13-valent 6wks-17yrs; >50yrs    2. Hyponatremia    - Sodium  - Adrenocorticotropic Hormone (ACTH)  The evaluation of the hyponatremia was consistent with syndrome of inappropriate ADH    3. Essential hypertension  We will have her stop lisinopril and begin Cozaar 50 mg daily    4. Pleural effusion    - CT Chest With Contrast; Future  The CT scan that was done in the hospital was to evaluate for pulmonary embolus and the radiologist suggested doing a multiphasic noncontrast and contrast CT of the chest or lung.    5. Mass of lung    - HM1(CBC and Differential)  - CT Chest With Contrast; Future  - HM1 (CBC with Diff)  The patient will likely need to have either a thoracentesis or a biopsy of the hilar mass either by bronchoscopy or CT directed biopsy.    6. Loss of weight    - HM1(CBC and Differential)  - CT Chest With Contrast; Future  - HM1 (CBC with Diff)  This is likely related to #5.    7. Cigarette nicotine dependence with nicotine-induced disorder  Prescribed nicotine patch 21 mg to be used daily      I have had an Advance Directives discussion with the patient.  I have counseled the patient for tobacco cessation and prescribed the patient a tobacco cessation medication and the follow up will occur  at the next visit.    Erik Reaves MD      PROGRESS NOTE   10/23/2018    SUBJECTIVE:  Barbie Deluca is a 78 y.o. female  who presents for   Chief Complaint   Patient presents with     Follow-up     INF 10/16-10/17- not feeling well       1. Need for vaccination  The patient would like to get caught up on her immunizations today although she does not want the influenza vaccine.    2. Hyponatremia  Recent hospitalization October 16 through the 17th with hyponatremia with workup that showed a left hilar mass and questionable effusion in the left  pleura.  The patient had a CT of her sinuses done which was negative and she had an echocardiogram done which showed a good ejection fraction with no pericardial effusion of significance.  The patient's hydrochlorothiazide was stopped and her sodium has improved.  It was down to 125.    3. Essential hypertension  The patient's blood pressure seems to be satisfactory off of the hydrochlorothiazide and she is on lisinopril but does not like the metallic taste that she thinks is related to the ACE inhibitor.  She like to change the blood pressure pill.    4. Pleural effusion  The patient complains of shortness of breath and has had about a 40 pound weight loss over the last 2 years.  She been having some dyspepsia with belching and early satiety.  She does not have any dysphasia.    5. Mass of lung  50-pack-year history of smoking currently smoking about three fourths of a pack of cigarettes a day.  Strong family history for cancer primarily colon cancer.    6. Loss of weight  Patient has a tendency towards some constipation and is not noticed any blood in her stools.  Just has very poor appetite.    7. Cigarette nicotine dependence with nicotine-induced disorder  Patient would like assistance to quit smoking.  Years ago she tried the nicotine gum.  Patient Active Problem List   Diagnosis     Hypertension     Hyponatremia       Current Outpatient Prescriptions   Medication Sig Dispense Refill     famotidine (PEPCID) 20 MG tablet Take 1 tablet (20 mg total) by mouth 2 (two) times a day.  0     ondansetron (ZOFRAN) 4 MG tablet Take 1 tablet (4 mg total) by mouth every 8 (eight) hours as needed for nausea. 10 tablet 0     acetaminophen (TYLENOL) 500 MG tablet Take 1-2 tablets (500-1,000 mg total) by mouth every 6 (six) hours as needed.  0     aspirin 325 MG tablet Take 1 tablet (325 mg total) by mouth daily.  0     losartan (COZAAR) 50 MG tablet Take 1 tablet (50 mg total) by mouth daily. 30 tablet 11     nicotine  (NICODERM CQ) 21 mg/24 hr Place 1 patch on the skin daily. 30 patch 1     No current facility-administered medications for this visit.        History   Smoking Status     Current Every Day Smoker     Packs/day: 0.75     Years: 55.00   Smokeless Tobacco     Never Used           OBJECTIVE:        Recent Results (from the past 240 hour(s))   ECG 12 lead nursing unit performed   Result Value Ref Range    SYSTOLIC BLOOD PRESSURE  mmHg    DIASTOLIC BLOOD PRESSURE  mmHg    VENTRICULAR RATE 93 BPM    ATRIAL RATE 93 BPM    P-R INTERVAL 150 ms    QRS DURATION 80 ms    Q-T INTERVAL 374 ms    QTC CALCULATION (BEZET) 465 ms    P Axis 75 degrees    R AXIS -7 degrees    T AXIS 30 degrees    MUSE DIAGNOSIS       Normal sinus rhythm with sinus arrhythmia  Low voltage QRS  Borderline ECG  When compared with ECG of 05-FEB-2016 11:12,  ST no longer depressed in Inferior leads  Nonspecific T wave abnormality has replaced inverted T waves in Inferior leads  Nonspecific T wave abnormality no longer evident in Lateral leads  Confirmed by PETER MIKE MD LOC:[facility] ([id number]) on 10/17/2018 4:28:07 PM     Basic Metabolic Panel   Result Value Ref Range    Sodium 125 (L) 136 - 145 mmol/L    Potassium 4.3 3.5 - 5.0 mmol/L    Chloride 87 (L) 98 - 107 mmol/L    CO2 24 22 - 31 mmol/L    Anion Gap, Calculation 14 5 - 18 mmol/L    Glucose 90 70 - 125 mg/dL    Calcium 9.4 8.5 - 10.5 mg/dL    BUN 18 8 - 28 mg/dL    Creatinine 0.84 0.60 - 1.10 mg/dL    GFR MDRD Af Amer >60 >60 mL/min/1.73m2    GFR MDRD Non Af Amer >60 >60 mL/min/1.73m2   Magnesium   Result Value Ref Range    Magnesium 2.3 1.8 - 2.6 mg/dL   Thyroid Cascade   Result Value Ref Range    TSH 0.89 0.30 - 5.00 uIU/mL   Hepatic Profile   Result Value Ref Range    Bilirubin, Total 0.4 0.0 - 1.0 mg/dL    Bilirubin, Direct 0.1 <=0.5 mg/dL    Protein, Total 7.5 6.0 - 8.0 g/dL    Albumin 3.7 3.5 - 5.0 g/dL    Alkaline Phosphatase 71 45 - 120 U/L    AST 20 0 - 40 U/L    ALT 11 0 - 45 U/L   Lipase    Result Value Ref Range    Lipase 18 0 - 52 U/L   HM1 (CBC with Diff)   Result Value Ref Range    WBC 10.7 4.0 - 11.0 thou/uL    RBC 4.58 3.80 - 5.40 mill/uL    Hemoglobin 13.5 12.0 - 16.0 g/dL    Hematocrit 39.5 35.0 - 47.0 %    MCV 86 80 - 100 fL    MCH 29.5 27.0 - 34.0 pg    MCHC 34.2 32.0 - 36.0 g/dL    RDW 12.1 11.0 - 14.5 %    Platelets 425 140 - 440 thou/uL    MPV 9.0 8.5 - 12.5 fL    Neutrophils % 77 (H) 50 - 70 %    Lymphocytes % 15 (L) 20 - 40 %    Monocytes % 7 2 - 10 %    Eosinophils % 1 0 - 6 %    Basophils % 0 0 - 2 %    Neutrophils Absolute 8.2 (H) 2.0 - 7.7 thou/uL    Lymphocytes Absolute 1.6 0.8 - 4.4 thou/uL    Monocytes Absolute 0.7 0.0 - 0.9 thou/uL    Eosinophils Absolute 0.1 0.0 - 0.4 thou/uL    Basophils Absolute 0.0 0.0 - 0.2 thou/uL   Urinalysis-UC if Indicated   Result Value Ref Range    Color, UA Yellow Colorless, Yellow, Straw, Light Yellow    Clarity, UA Clear Clear    Glucose, UA Negative Negative    Bilirubin, UA Negative Negative    Ketones,  mg/dL (!) Negative    Specific Gravity, UA 1.020 1.001 - 1.030    Blood, UA Negative Negative    pH, UA 5.0 4.5 - 8.0    Protein, UA 30 mg/dL (!) Negative mg/dL    Urobilinogen, UA 2.0 E.U./dL <2.0 E.U./dL, 2.0 E.U./dL    Nitrite, UA Negative Negative    Leukocytes, UA Negative Negative    Bacteria, UA None Seen None Seen hpf    RBC, UA 0-2 None Seen, 0-2 hpf    WBC, UA 0-5 None Seen, 0-5 hpf    Squam Epithel, UA 10-25 (!) None Seen, 0-5 lpf    Mucus, UA Few (!) None Seen lpf    Hyaline Casts, UA 25-50 (!) 0-5, None Seen lpf   Sodium, Random Urine   Result Value Ref Range    Sodium, Urine 31 mmol/L   Osmolality, Random, Urine   Result Value Ref Range    Osmolality, Urine 499 300 - 900 mOsm/kg   Osmolality   Result Value Ref Range    Osmolality, Blood 265 (L) 270 - 300 mOsm/kg   Cortisol   Result Value Ref Range    Cortisol 12.4 ug/dL   HM2(CBC w/o Differential)   Result Value Ref Range    WBC 9.2 4.0 - 11.0 thou/uL    RBC 4.01 3.80 - 5.40  mill/uL    Hemoglobin 11.8 (L) 12.0 - 16.0 g/dL    Hematocrit 35.3 35.0 - 47.0 %    MCV 88 80 - 100 fL    MCH 29.4 27.0 - 34.0 pg    MCHC 33.4 32.0 - 36.0 g/dL    RDW 12.1 11.0 - 14.5 %    Platelets 398 140 - 440 thou/uL    MPV 8.9 8.5 - 12.5 fL   Basic Metabolic Panel   Result Value Ref Range    Sodium 131 (L) 136 - 145 mmol/L    Potassium 4.1 3.5 - 5.0 mmol/L    Chloride 96 (L) 98 - 107 mmol/L    CO2 24 22 - 31 mmol/L    Anion Gap, Calculation 11 5 - 18 mmol/L    Glucose 67 (L) 70 - 125 mg/dL    Calcium 8.8 8.5 - 10.5 mg/dL    BUN 12 8 - 28 mg/dL    Creatinine 0.70 0.60 - 1.10 mg/dL    GFR MDRD Af Amer >60 >60 mL/min/1.73m2    GFR MDRD Non Af Amer >60 >60 mL/min/1.73m2   Echo Complete   Result Value Ref Range    LV volume diastolic 60.8 46 - 106 cm3    LV volume systolic 21.1 14 - 42 cm3    IVSd 1.16 0.6 - 0.9 cm    LVIDd 4.23 3.8 - 5.2 cm    LVIDs 2.68 2.2 - 3.5 cm    LVOT diam 1.8 cm    LVOT mean gradient 4 mmHg    LVOT peak VTI 24.2 cm    LVOT mean ryan 93.9 cm/s    LVOT peak ryan 143 cm/s    LVOT peak gradient 8 mmHg    LV PWd 1.03 0.6 - 0.9 cm    MV E' lat ryan 7.3 cm/s    MV E' med ryan 9.94 cm/s    AV mean ryan 126 cm/s    AV mean gradient 8 mmHg    AV VTI 32.8 cm    AV peak ryan 202 cm/s    AO root 3.3 cm    LA size 2.5 cm    LA/AO root ratio 0.758 no units    MV decel slope 3650 mm/s2    MV decel time 214 ms    MV P 1/2 time 63 ms    MV peak A ryan 97.3 cm/s    MV peak E ryan 78 cm/s    RVIDd 2 cm    TR peak ryan 281 cm/s    BSA 1.61 m2    Hieght 62 in    Weight 2060.8 lbs    /77 mmHg    HR 88 bpm    IVS/PW ratio 1.1     TR peak gradent 31.6 mmHg    LV FS 36.6 28 - 44 %    Echo LVEF calculated 65 55 - 75 %    LA volume 49.9 mL    LV mass 157.8 g    AV area 1.9 cm2    AV DIM IND ryan 0.7     MV area p 1/2 time 3.5 cm2    MV E/A Ratio 0.8     LVOT area 2.54 cm2    LVOT SV 61.6 cm3    AV peak gradient 16.3 mmHg    LV systolic volume index 13.1 11 - 31 cm3/m2    LV diastolic volume index 37.8 34 - 74 cm3/m2    LA  volume index 31.0 mL/m2    LV mass index 98.0 g/m2    LV SVi 38.2 ml/m2    TAPSE 1.8 cm    MV med E/e' ratio 7.8     MV lat E/e' ratio 10.7     LV CO 5.4 l/min    LV Ci 3.4 l/min/m2    LA area 2 15 cm2    LA area 1 18 cm2    Height 62.0 in    Weight 129 lbs    MV Avg E/e' Ratio 9.0 cm/s    LA length 4.6 cm    AV DIM IND VTI 0.7        Vitals:    10/23/18 1406   BP: 128/80   Patient Position: Sitting   Cuff Size: Adult Regular   Pulse: (!) 108   SpO2: 97%   Weight: 130 lb 5 oz (59.1 kg)     Weight: 130 lb 5 oz (59.1 kg)        Physical Exam:  GENERAL APPEARANCE: Very pleasant 78-year-old female, complaining of poor appetite and left-sided chest pain, well hydrated, well nourished  SKIN:  Normal skin turgor, no lesions/rashes   HEENT: moist mucous membranes, no rhinorrhea, no oral lesions, upper dentures  NECK: Normal without adenopathy or masses  CV: RRR, no M/G/R   LUNGS:  patient has symmetrically decreased breath sounds with decreased breath sounds a bit higher on the left lower lung field than the right.  ABDOMEN: S&NT, no masses or enlarged organs   EXTREMITY: no edema and full ROM of all joints  NEURO: no focal findings

## 2021-06-22 NOTE — PROGRESS NOTES
ASSESSMENT/PLAN:       1. Weight loss    - mirtazapine (REMERON) 7.5 MG tablet; Take 1 tablet (7.5 mg total) by mouth at bedtime.  Dispense: 30 tablet; Refill: 1  I am hopeful that this will help her with her appetite and her sleep.  Encourage frequent small amounts of soft food and liquids.  Particularly need to work harder on getting fluids down.  I will plan to schedule a return appointment in 2 weeks but would be happy to see her sooner if needed.    2. Anxiety    - mirtazapine (REMERON) 7.5 MG tablet; Take 1 tablet (7.5 mg total) by mouth at bedtime.  Dispense: 30 tablet; Refill: 1  Continue with lorazepam as needed    3. Squamous cell carcinoma of left lung (H)  Continue with chemotherapy and [facility] oncology care.  Very concerned about the patient's decline in her status with increased frailty and decompensation in her strength.  Continue with pain control with OxyContin twice a day and Percocet every 6 hours    4. Anorexia  Efforts to improve nutrition discussed    #5 hypertension  Make sure she is taking the Cozaar    6.  Peripheral edema  Suggested the use of knee-high compression stockings during the day and remove them before going to bed at night  Fall precautions discussed      At next visit will need to have a more in-depth talk about advanced directive and her wishes going forward.    Erik Reaves MD      PROGRESS NOTE   12/4/2018    SUBJECTIVE:  Barbie Deluca is a 78 y.o. female  who presents for   Chief Complaint   Patient presents with     Medication Management   The patient was wanting to be seen today so that one person is managing her overall health care needs.  1. Weight loss  Note that the patient has lost 4 pounds in the last month but has very poor appetite only able to eat or drink small amounts.  She has not found that the Marinol has been helpful for her appetite.  She does not like to use the Zofran dissolvable tablets because it leaves a bad taste in her mouth.  She has not  liked the flavors of the Ensure Pedialyte.      2. Anxiety  Of the lorazepam has helped the patient with her anxiety particularly at night and allowed her to sleep somewhat better.    3. Squamous cell carcinoma of left lung (H)  The patient is receiving oral chemotherapy through [facility]  The patient's daughter has helped her maintain adequate pain control with using OxyContin 10 mg twice a day and Percocet 5-325 mg every 6 hours alternating with the OxyContin.  In spite of this the patient still complains of some generalized discomfort but it has been improved since using the OxyContin and the Percocet around-the-clock.    4. Anorexia  Patient just has no appetite and having infrequent bowel movements.  She is urinating regularly.  She has not noticed any fever.    #5 hypertension  The patient's daughter is not sure if she has been taking the Cozaar.  Normally would take that at night.  Blood pressure is quite elevated today which is surprising in view of her weight loss.    6.  Peripheral edema  Daughter has noticed some swelling in the ankles and feet which is been walking very little has not had any falls and does not complain of any discomfort in her lower extremity.    Patient Active Problem List   Diagnosis     Hypertension     Hyponatremia     Non-recurrent unilateral inguinal hernia without obstruction or gangrene     Squamous cell carcinoma of left lung (H)       Current Outpatient Medications   Medication Sig Dispense Refill     LORazepam (ATIVAN) 0.5 MG tablet Take 0.5 mg by mouth.       losartan (COZAAR) 50 MG tablet Take 1 tablet (50 mg total) by mouth daily. 30 tablet 11     ondansetron (ZOFRAN) 4 MG tablet Take 1 tablet (4 mg total) by mouth every 8 (eight) hours as needed for nausea. 10 tablet 0     oxyCODONE (OXYCONTIN) 10 mg 12 hr tablet Take 10 mg by mouth.       oxyCODONE-acetaminophen (PERCOCET/ENDOCET) 5-325 mg per tablet Take 1 tablet by mouth.        prochlorperazine (COMPAZINE) 10 MG tablet Take 10 mg by mouth.       acetaminophen (TYLENOL) 500 MG tablet Take 1-2 tablets (500-1,000 mg total) by mouth every 6 (six) hours as needed.  0     famotidine (PEPCID) 20 MG tablet Take 1 tablet (20 mg total) by mouth 2 (two) times a day. (Patient taking differently: Take 20 mg by mouth 2 (two) times a day as needed. )  0     mirtazapine (REMERON) 7.5 MG tablet Take 1 tablet (7.5 mg total) by mouth at bedtime. 30 tablet 1     polyethylene glycol (MIRALAX) 17 gram packet Take 17 g by mouth.       No current facility-administered medications for this visit.        Social History     Tobacco Use   Smoking Status Current Every Day Smoker     Packs/day: 0.75     Years: 55.00     Pack years: 41.25   Smokeless Tobacco Never Used           OBJECTIVE:        No results found for this or any previous visit (from the past 240 hour(s)).    Vitals:    12/04/18 1400 12/04/18 1446   BP: (!) 168/100 (!) 178/113   Patient Position:  Lying   Pulse: (!) 124    Resp: 16    SpO2: 93%    Weight: 121 lb 9 oz (55.1 kg)      Weight: 121 lb 9 oz (55.1 kg)          Physical Exam:  GENERAL APPEARANCE: 78-year-old female seen to her daughter, the patient is weak appearing and mildly dehydrated.  Note that the patient has lost 4 pounds in the last month.  SKIN:  Normal skin turgor, no lesions/rashes   HEENT:  mildly dry mucous membranes, no rhinorrhea  NECK: Normal without adenopathy or masses  CV: RRR, no M/G/R   LUNGS: CTAB  ABDOMEN: S&NT, no masses or enlarged organs   EXTREMITY: 1-2+ edema ankle and foot left side more noticeable in the right and full ROM of all joints  NEURO: The patient is lethargic and prefers to lie down.  She was able to walk from the lobby into the exam room.  She did not need assistance.

## 2021-06-22 NOTE — PROGRESS NOTES
ASSESSMENT/PLAN:       1. Numbness and tingling in left arm  2. Numbness and tingling of left leg  3. Numbness and tingling of left side of face  4. Weakness of left leg  5. Left arm weakness  6. Paroxysmal atrial fibrillation (H)  7. Squamous cell carcinoma of left lung (H)    I discussed with the patient and her daughter the possible etiologies for her new symptoms which certainly are concerning for a cerebrovascular event.  She has had a CT of the head done and within the last month which was negative and that CTA included evaluation of the carotid arteries which showed 50% stenosis or less.  She has not been a candidate recently for anticoagulant or antiplatelet therapy because of her significant upper GI bleed that is currently being treated with dual antibiotics for positive H. pylori blood test.  She is also on dual therapy for acid suppression.    After this discussion the patient and her daughter agreed to going to the emergency room at [facility] I called there to give a history of her symptoms and they agree with the transfer.  The patient and her daughter would like to take her by car.          Erik Reaves MD      PROGRESS NOTE   1/9/2019    SUBJECTIVE:  Barbie Deluca is a 78 y.o. female  who presents for   Chief Complaint   Patient presents with     Follow-up     left side of face numbness, left hands, left hand tremur, reading high BP, had 2nd shingle yesterday.      This morning when the patient woke up she noticed some numbness on the left side of her face left arm and leg along with more weakness on the left side upper and lower extremity and occasional blurred vision left visual field and some double vision.  2 days ago the patient had an echocardiogram done along with chest x-ray and ultrasound of both lower extremity.  There is no evidence for pericardial effusion and no DVT in the lower extremity.  About 2 weeks ago the patient was hospitalized at [facility] with an  upper GI bleed and had a embolization procedure done to stop the bleeding.  Since that treatment of her ulcer she has had some improvement in her appetite.  Today she was here at the clinic for blood work with a basic metabolic panel and because of the new symptoms today she was seen.    Patient Active Problem List   Diagnosis     Hypertension     Hyponatremia     Non-recurrent unilateral inguinal hernia without obstruction or gangrene     Squamous cell carcinoma of left lung (H)     Acute blood loss anemia     Acute anemia     Hemorrhagic shock (H)     Non-small cell carcinoma of left lung, stage 4 (H)     Leukocytosis, unspecified type     Paroxysmal atrial fibrillation (H)     Abdominal pain, generalized     Hypomagnesemia     GI bleeding       Current Outpatient Medications   Medication Sig Dispense Refill     famotidine (PEPCID) 20 MG tablet Take 1 tablet (20 mg total) by mouth 2 (two) times a day. 60 tablet 1     LORazepam (ATIVAN) 0.5 MG tablet Take 0.5 mg by mouth every 4 (four) hours as needed for anxiety, nausea or sleep.       metoprolol tartrate (LOPRESSOR) 25 MG tablet Take 1 tablet (25 mg total) by mouth 2 (two) times a day. 60 tablet 3     ondansetron (ZOFRAN) 8 MG tablet Take 4 mg by mouth every 8 (eight) hours as needed for nausea.       oxyCODONE-acetaminophen (PERCOCET/ENDOCET) 5-325 mg per tablet Take 1-2 tablets by mouth every 4 (four) hours as needed for pain.       pantoprazole (PROTONIX) 40 MG tablet Take 1 tablet (40 mg total) by mouth 2 (two) times a day. 60 tablet 0     polyethylene glycol (MIRALAX) 17 gram packet Take 1 packet (17 g total) by mouth daily as needed. constipation 30 each 2     senna-docusate (PERICOLACE) 8.6-50 mg tablet Take 1 tablet by mouth 4 (four) times a day .             loratadine (CLARITIN) 10 mg tablet Take 10 mg by mouth daily Take day of chemotherapy and for 4 days after chemotherapy. .       mirtazapine (REMERON) 7.5 MG tablet Take 1 tablet (7.5 mg total) by  mouth at bedtime. 30 tablet 1     oxyCODONE (OXYCONTIN) 10 mg 12 hr tablet Take 10 mg by mouth every 12 (twelve) hours.       No current facility-administered medications for this visit.        Social History     Tobacco Use   Smoking Status Former Smoker     Types: Cigarettes     Last attempt to quit: 2018     Years since quittin.0   Smokeless Tobacco Never Used           OBJECTIVE:        No results found for this or any previous visit (from the past 240 hour(s)).    Vitals:    19 1337   BP: 140/88   Patient Position: Sitting   Cuff Size: Adult Regular   Pulse: 73   SpO2: 94%     Weight: 112 lb 5 oz (50.9 kg)          Physical Exam:  GENERAL APPEARANCE: 78-year-old female seen today with her daughter, she is cachectic appearing and very tired lethargic but answers questions appropriately.  SKIN:  Normal skin turgor, no lesions/rashes   HEENT: moist mucous membranes, no rhinorrhea  NECK: Normal without adenopathy or masses  EXTREMITY: 3+ edema in the lower extremity below the knee including the foot fairly symmetric  NEURO: She seems to have normal extraocular motion and no weakness in her face but does have weakness in the left upper and lower extremity compared to the right and decreased sensation in the left upper and lower extremity along with decreased sensation of the left side of her face compared to the right.

## 2021-06-22 NOTE — TELEPHONE ENCOUNTER
Patient will need follow-up cbc two times/week after transfusion. Also I should see her back in the clinic next week.

## 2021-06-22 NOTE — ANESTHESIA PREPROCEDURE EVALUATION
Anesthesia Evaluation      Patient summary reviewed   No history of anesthetic complications     Airway   Mallampati: III  Neck ROM: limited   Pulmonary - normal exam   (+) a smoker    ROS comment: Lung ca                          Cardiovascular - normal exam  Exercise tolerance: > or = 4 METS  (+) hypertension, dysrhythmias, ,      Neuro/Psych      Endo/Other       GI/Hepatic/Renal      Comments: Gi bleed.  Received PRBC for anemia            Dental    (+) upper dentures, poor dentition and chipped            IMPRESSIONS:   1. Normal left ventricular size, wall thickness and systolic function. The quantified left ventricular ejection fraction is 62.8%. Small area of myocardial scar involving the mid inferolateral wall.   2. Normal right ventricular size and systolic function.   3. Tricuspid aortic valve with moderate sclerosis. Mild aortic insufficiency (RF:6%). No significant aortic stenosis (peak velocity: <2 m/sec).  4. Mild left atrial enlargement. Mild right atrial enlargement.   5. Large mass (3 cm x 7 cm x 7 cm) encasing the left superior heart border likely attached to parietal pericardium. No clear invasion into myocardium or pericardial space. Mass appears to be encasing left superior pulmonary vein and around the inferior aspect of the left pulmonary artery. No evidence of obstruction of flow in left pulmonary artery.           Anesthesia Plan  Planned anesthetic: MAC  Risk and benefit of mac vs ga d/w with patient.  Patient acknowledges that this anesthetic isn't a GA and that it is possible and normal to recall intraop events.      Will keep patient spont breathing due to patient's mediastinal pathology    Fire precautions  ASA 2     Anesthetic plan and risks discussed with: patient  Anesthesia plan special considerations: antiemetics,   Post-op plan: routine recovery

## 2021-06-22 NOTE — ANESTHESIA POSTPROCEDURE EVALUATION
Patient: Barbie Deluca  ESOPHAGOGASTRODUODENOSCOPY (EGD) WITH DUODENAL BIOPSY AND EPINEPHERINE INJECTION. - Poss. general to protect airway  Anesthesia type: MAC    Patient location: PACU  Last vitals:   Vitals:    12/18/18 1515   BP: (P) 155/74   Pulse: (P) 100   Resp:    Temp:    SpO2:      Post vital signs: stable  Level of consciousness: awake and responds to simple questions  Post-anesthesia pain: pain controlled  Post-anesthesia nausea and vomiting: no  Pulmonary: unassisted, nasal cannula  Cardiovascular: stable and blood pressure at baseline  Hydration: adequate  Anesthetic events: no    QCDR Measures:  ASA# 11 - Cathi-op Cardiac Arrest: ASA11B - Patient did NOT experience unanticipated cardiac arrest  ASA# 12 - Cathi-op Mortality Rate: ASA12B - Patient did NOT die  ASA# 13 - PACU Re-Intubation Rate: ASA13B - Patient did NOT require a new airway mgmt  ASA# 10 - Composite Anes Safety: ASA10A - No serious adverse event    Additional Notes:

## 2021-06-22 NOTE — PROGRESS NOTES
TCM DISCHARGE FOLLOW UP CALL    Discharge Date:  12/24/2018  Reason for hospital stay (discharge diagnosis)::  Duodenal ulcer, hemorrhagic shock, aneurysm gastroduodenal artery, S/P coil embolization.  Are you feeling better, the same or worse since your discharge?:  Patient is feeling the same (Very tired. poor p.o. intake.)  Do you feel like you have a plan in the event of a health emergency?: No    (RN) Patient provided with information to call us in the event of a health emergency: Yes (Encouraged to call [facility], gave son phone number)    As part of your discharge plan, were  home care services ordered for you?: Yes    Have you seen them yet, or are they scheduled to visit?: No    Did you receive any new medications, or was there a change to your medications?: Yes    Are you taking those medications, or do you have any established regiment?:  Son plans to bring list to clinic appt today and review with Dr Reaves.  Do you have any follow up visits scheduled with your PCP or Specialist?:  Yes, with PCP (12/26)  (RN) Is PCP appt scheduled soon enough (within 14 days of discharge date)?: Yes    RN NOTES::  Instructed importance of houly position changes to avoid pressure sores. Encourage fluids, protein.

## 2021-06-22 NOTE — TELEPHONE ENCOUNTER
Recommend schedule visit for tomorrow with Dr. Reaves but if any severe worsening then be seen at ER.  Also certainly okay to check with her specialist.

## 2021-06-22 NOTE — PROGRESS NOTES
ASSESSMENT/PLAN:       1. Constipation, unspecified constipation type    - polyethylene glycol (MIRALAX) 17 gram packet; Take 1 packet (17 g total) by mouth daily as needed. constipation  Dispense: 30 each; Refill: 2  Continue with stool softener  2. Acute blood loss anemia    - HM1(CBC and Differential)  - HM1 (CBC with Diff)  My chart message with results of CBC  3. Hypomagnesemia    - Magnesium  My chart message with results of magnesium  4. Non-small cell carcinoma of left lung, stage 4 (H)  Discussed with the patient and her family treatment options including the immunotherapy which would likely be fairly well-tolerated.  The patient has had a point where she does not know if she wants to go through with more treatment options that have a low likelihood of being beneficial and may make her feel worse.  Certainly if she wants to pursue that she will need to make an appointment with medical oncology at the [facility].    5. Paroxysmal atrial fibrillation (H)  I like to increase the patient's metoprolol to 25 mg twice a day.    6. Weight loss  I like for her to restart her mirtazapine but discontinue the olanzapine.  She felt that the mirtazapine was assisting her with her appetite.    7. Benign essential hypertension  I am going to have the patient stop losartan and increase her metoprolol.  We will see if not taking the losartan will help with the bad taste in her mouth.    #8 bleeding duodenal ulcer, large  Continue with Protonix 40 mg twice a day as well as Pepcid 20 mg twice a day          Erik Reaves MD      PROGRESS NOTE   12/26/2018    SUBJECTIVE:  Barbie Deluca is a 78 y.o. female  who presents for   Chief Complaint   Patient presents with     Follow-up     12/16-12/24, low HGB, had blood transfusions, has been running a fever since 12/22         1. Constipation, unspecified constipation type  The patient has been having a bowel movement about 3 times a week and did have one today.  Earlier was  having some black stools but has not noticed that today.  Appetite has continued to be poor with continued weight loss.  The patient uses MiraLAX and a stool softener.    2. Acute blood loss anemia  The patient was admitted to the hospital December 16-24 with syncope anemia related to a bleeding duodenal ulcer.  She received 2 units of blood.  Upon dismissal from the hospital her hemoglobin was 8.2.    3. Hypomagnesemia  The patient is taking a magnesium supplement and in the hospital her magnesium was 1.4.  She apparently was to take the magnesium supplement for just 5 days.    4. Non-small cell carcinoma of left lung, stage 4 (H)  The patient has only had one chemotherapy treatment for her lung cancer and is not been able to be well enough to continue that treatment.  There is been some mention about the possibility of immunotherapy which may be tolerated better than her chemotherapy.  The patient and her family has some questions about this option.  The patient will be receiving home health care but not ready for hospice care.  I discussed with the patient and her family how hospice care works in regard to her that would be an option when she feels that her treatment options have been exhausted for her cancer.  We be more than happy to assist her with that referral to hospice care if she gets to that point.  I did explain to the patient and her family that I felt that her life expectancy was less than 6 months and so she would certainly qualify for hospice care if they decided that they did not want to pursue further active treatment for her lung cancer.    5. Paroxysmal atrial fibrillation (H)  The patient has had some episodes of atrial fibrillation with tachycardia and is on a beta-blocker low dose 12.5 mg twice a day of metoprolol.  She has not noticed any racing of her heart.    6. Weight loss  Over the last 3 months the patient has lost 17 pounds and over the last 4 weeks she has lost 8 pounds.  Her  appetite is poor she has a bad taste in her mouth and she has an interest in eating but nothing taste good or sounds good to her.  With the duodenal ulcer she was on a liquid diet for some time but now the last 2 or 3 days she has been on more of a regular diet.  She was prescribed mirtazapine to see if that might help stimulate her appetite which she feels felt that it did.  However when she was hospitalized previously she was also placed on olanzapine which she felt made her very restless and sedated and did not help her appetite.  She is no longer been taking the olanzapine or the mirtazapine.  She had questions about CBD and medical marijuana.  She had been taking Marinol for a short period of time which she did not feel helped her appetite.    7. Benign essential hypertension  The patient's blood pressure seems to be on the low side although apparently in the hospital there were times when it was elevated.  She is wondering if she needs to take both of her blood pressure medicines.  She still questions if the losartan may be contributing to a bad taste in her mouth.    #8 bleeding duodenal ulcer, large  There was evidence at EGD for a large duodenal ulcer that was not actively bleeding but definitive treatment could not be done.  Patient therefore had a embolization procedure for the blood vessel that would supply this ulcer.  She was placed on proton pump inhibitor and currently is taking Protonix 40 mg twice a day.  She is aware that she cannot take nonsteroidal anti-inflammatory medications but okay to use Tylenol products if needed for pain.  Of interest is that her pain is much improved even the pain that she was having in her shoulder and.  She is currently not using any narcotics for pain.  She also has not needed to use lorazepam  Patient Active Problem List   Diagnosis     Hypertension     Hyponatremia     Non-recurrent unilateral inguinal hernia without obstruction or gangrene     Squamous cell  carcinoma of left lung (H)     Acute blood loss anemia     Acute anemia     Hemorrhagic shock (H)     Non-small cell carcinoma of left lung, stage 4 (H)     Leukocytosis, unspecified type     Paroxysmal atrial fibrillation (H)     Abdominal pain, generalized     Hypomagnesemia     GI bleeding       Current Outpatient Medications   Medication Sig Dispense Refill     LORazepam (ATIVAN) 0.5 MG tablet Take 0.5 mg by mouth every 4 (four) hours as needed for anxiety, nausea or sleep.       magnesium oxide (MAG-OX) 400 mg (241.3 mg magnesium) tablet Take 1 tablet (400 mg total) by mouth 3 (three) times a day for 5 days. 15 tablet 0     metoprolol tartrate (LOPRESSOR) 25 MG tablet Take 1 tablet (25 mg total) by mouth 2 (two) times a day. 60 tablet 3     mirtazapine (REMERON) 7.5 MG tablet Take 1 tablet (7.5 mg total) by mouth at bedtime. 30 tablet 1     ondansetron (ZOFRAN) 8 MG tablet Take 4 mg by mouth every 8 (eight) hours as needed for nausea.       oxyCODONE (OXYCONTIN) 10 mg 12 hr tablet Take 10 mg by mouth every 12 (twelve) hours.       oxyCODONE-acetaminophen (PERCOCET/ENDOCET) 5-325 mg per tablet Take 1-2 tablets by mouth every 4 (four) hours as needed for pain.       pantoprazole (PROTONIX) 40 MG tablet Take 1 tablet (40 mg total) by mouth 2 (two) times a day. 60 tablet 0     polyethylene glycol (MIRALAX) 17 gram packet Take 1 packet (17 g total) by mouth daily as needed. constipation 30 each 2     senna-docusate (PERICOLACE) 8.6-50 mg tablet Take 1 tablet by mouth 4 (four) times a day .             loratadine (CLARITIN) 10 mg tablet Take 10 mg by mouth daily Take day of chemotherapy and for 4 days after chemotherapy. .       No current facility-administered medications for this visit.        Social History     Tobacco Use   Smoking Status Former Smoker     Types: Cigarettes     Last attempt to quit: 2018     Years since quittin.0   Smokeless Tobacco Never Used           OBJECTIVE:        Recent Results  (from the past 240 hour(s))   ECG 12 lead nursing unit performed   Result Value Ref Range    SYSTOLIC BLOOD PRESSURE  mmHg    DIASTOLIC BLOOD PRESSURE  mmHg    VENTRICULAR RATE 79 BPM    ATRIAL RATE 79 BPM    P-R INTERVAL 138 ms    QRS DURATION 74 ms    Q-T INTERVAL 358 ms    QTC CALCULATION (BEZET) 410 ms    P Axis 72 degrees    R AXIS 6 degrees    T AXIS 196 degrees    MUSE DIAGNOSIS       Sinus rhythm with Premature supraventricular complexes  Low voltage QRS  ST & T wave abnormality, consider anterior ischemia  Early transition  Abnormal ECG  When compared with ECG of 16-OCT-2018 14:54,  Premature supraventricular complexes are now Present  Non-specific change in ST segment in Anterior leads  Nonspecific T wave abnormality, worse in Inferior leads  T wave inversion now evident in Anterior leads  QT has shortened  Confirmed by RUDI DODGE, LES LOC:WW ([id number]) on 12/16/2018 5:03:39 PM     Comprehensive Metabolic Panel   Result Value Ref Range    Sodium 135 (L) 136 - 145 mmol/L    Potassium 4.7 3.5 - 5.0 mmol/L    Chloride 104 98 - 107 mmol/L    CO2 22 22 - 31 mmol/L    Anion Gap, Calculation 9 5 - 18 mmol/L    Glucose 149 (H) 70 - 125 mg/dL    BUN 49 (H) 8 - 28 mg/dL    Creatinine 0.76 0.60 - 1.10 mg/dL    GFR MDRD Af Amer >60 >60 mL/min/1.73m2    GFR MDRD Non Af Amer >60 >60 mL/min/1.73m2    Bilirubin, Total 0.2 0.0 - 1.0 mg/dL    Calcium 8.6 8.5 - 10.5 mg/dL    Protein, Total 5.6 (L) 6.0 - 8.0 g/dL    Albumin 2.5 (L) 3.5 - 5.0 g/dL    Alkaline Phosphatase 112 45 - 120 U/L    AST 23 0 - 40 U/L    ALT 27 0 - 45 U/L   Troponin I   Result Value Ref Range    Troponin I 0.03 0.00 - 0.29 ng/mL   Lactic Acid   Result Value Ref Range    Lactic Acid 3.1 (H) 0.5 - 2.2 mmol/L   HM1 (CBC with Diff)   Result Value Ref Range    WBC 13.7 (H) 4.0 - 11.0 thou/uL    RBC 1.97 (L) 3.80 - 5.40 mill/uL    Hemoglobin 5.9 (LL) 12.0 - 16.0 g/dL    Hematocrit 18.7 (L) 35.0 - 47.0 %    MCV 95 80 - 100 fL    MCH 29.9 27.0 - 34.0 pg    MCHC  31.6 (L) 32.0 - 36.0 g/dL    RDW 15.9 (H) 11.0 - 14.5 %    Platelets 354 140 - 440 thou/uL    MPV 9.6 8.5 - 12.5 fL    Neutrophils % 96 (H) 50 - 70 %    Lymphocytes % 3 (L) 20 - 40 %    Monocytes % 1 (L) 2 - 10 %    Eosinophils % 0 0 - 6 %    Basophils % 0 0 - 2 %    Neutrophils Absolute 12.9 (H) 2.0 - 7.7 thou/uL    Lymphocytes Absolute 0.4 (L) 0.8 - 4.4 thou/uL    Monocytes Absolute 0.2 0.0 - 0.9 thou/uL    Eosinophils Absolute 0.0 0.0 - 0.4 thou/uL    Basophils Absolute 0.0 0.0 - 0.2 thou/uL   Magnesium   Result Value Ref Range    Magnesium 1.8 1.8 - 2.6 mg/dL   Morphology,Smear Review (MORP)   Result Value Ref Range    Pathology, Smear Review See Separate Pathology Report (!) (none)    WBC 13.7 (H) 4.0 - 11.0 thou/uL    RBC 1.97 (L) 3.80 - 5.40 mill/uL    Hemoglobin 5.9 (LL) 12.0 - 16.0 g/dL    Hematocrit 18.7 (L) 35.0 - 47.0 %    MCV 95 80 - 100 fL    MCH 29.9 27.0 - 34.0 pg    MCHC 31.6 (L) 32.0 - 36.0 g/dL    RDW 15.9 (H) 11.0 - 14.5 %    Platelets 354 140 - 440 thou/uL    MPV 9.6 8.5 - 12.5 fL    Neutrophils % 96 (H) 50 - 70 %    Lymphocytes % 3 (L) 20 - 40 %    Monocytes % 1 (L) 2 - 10 %    Eosinophils % 0 0 - 6 %    Basophils % 0 0 - 2 %    Neutrophils Absolute 12.9 (H) 2.0 - 7.7 thou/uL    Lymphocytes Absolute 0.4 (L) 0.8 - 4.4 thou/uL    Monocytes Absolute 0.2 0.0 - 0.9 thou/uL    Eosinophils Absolute 0.0 0.0 - 0.4 thou/uL    Basophils Absolute 0.0 0.0 - 0.2 thou/uL   Manual Differential   Result Value Ref Range    Platelet Estimate Normal Normal    Ovalocytes 1+ (!) Negative   Peripheral Blood Smear, Path Review   Result Value Ref Range    Case Report       Peripheral Blood Morphology Report                Case: [id number]                                   Authorizing Provider:  Nadine Hercules MD    Collected:           12/16/2018 1611              Ordering Location:     [place] [address] Received:            12/17/2018 0809                                     Cardiac/Neuro ICU                                                             Pathologist:           Omega Sanchez MD                                                        Specimen:    Peripheral Blood                                                                           Final Diagnosis       PERIPHERAL BLOOD SMEAR:    - NEUTROPHILIA     - MARKED NORMOCHROMIC-NORMOCYTIC ANEMIA    Comment       The clinical history has been reviewed. Although artifact cannot be entirely ruled out, the red blood cell morphology certainly suggest consideration for at least a minor component of hemolysis.     The causes of reactive neutrophilia are numerous and range from infection to metabolic defects. Bacterial infections are the predominant cause of neutrophilia; other causes include therapeutic or endogenous drugs/hormones, acute stress, acute tissue necrosis and other infectious/inflammatory processes, including collagen vascular disorders and autoimmune disease. Absolute neutrophilia is seen in a variety of hematopoietic neoplasms, especially myeloproliferative disorders. Recommend clinical correlation; consider repeat CBC after resolution of any possible infection. If a diagnosis of CML is suspected, evaluation of the peripheral blood for the presence of the Desha chromosome and/or the BCR/ABL fusion gene is suggested.     Normocytic anemia can be caused by multiple etiologies  including intrinsic bone marrow disease, renal disease, hepatic disease, endocrine disease, anemia of chronic disease, post-hemorrhagic anemia, and bone marrow infiltration by tumors. Recommend clinical correlation and follow-up.    Peripheral Smear       Red blood cells are normal in number and overall normochromic and normocytic. Anisopoikilocytosis and polychromasia are increased, but rouleaux formation does not appear prominent.    The white blood cell count and differential appear as reported on the CBC. Leukocytes are increased in number and demonstrate an  absolute neutrophilia. No blasts or dysplastic changes are identified.    Platelets are normal in number and appearance.    Charges CPT: 48182  ICD-10: D64.9    INR   Result Value Ref Range    INR 1.37 (H) 0.90 - 1.10   APTT   Result Value Ref Range    PTT 31 24 - 37 seconds   Fibrinogen   Result Value Ref Range    Fibrinogen 303 170 - 410 mg/dL   D-dimer, Quantitative   Result Value Ref Range    D-Dimer, Quant 2.45 (H) <=0.50 FEU ug/mL   Type and Screen   Result Value Ref Range    ABORh A POS     Antibody Screen Positive (!) Negative   Antibody Identification   Result Value Ref Range    Antibody ID Anti-[place]    Rare Antisera Typing, Patient   Result Value Ref Range    K Antigen, Patient Typing Negative    POCT Glucose   Result Value Ref Range    Glucose,  mg/dL   Crossmatch   Result Value Ref Range    Unit Type O Neg     Blood Expiration Date [id number]     Unit Number [id number]     Status Returned from Issue     Component Red Blood Cells     PRODUCT CODE E2971H61     Issue Date and Time [id number]     Blood Type 9500     CODING SYSTEM [id number]    Crossmatch   Result Value Ref Range    Unit Type O Neg     Blood Expiration Date [id number]     Unit Number [id number]     Status Returned from Issue     Component Red Blood Cells     PRODUCT CODE X9696M62     Issue Date and Time [id number]     Blood Type 9500     CODING SYSTEM [id number]    Hemoglobin   Result Value Ref Range    Hemoglobin 6.8 (LL) 12.0 - 16.0 g/dL   POCT Glucose   Result Value Ref Range    Glucose,  mg/dL   POCT Glucose   Result Value Ref Range    Glucose, POC 99 mg/dL   Basic Metabolic Panel   Result Value Ref Range    Sodium 137 136 - 145 mmol/L    Potassium 4.3 3.5 - 5.0 mmol/L    Chloride 109 (H) 98 - 107 mmol/L    CO2 23 22 - 31 mmol/L    Anion Gap, Calculation 5 5 - 18 mmol/L    Glucose 89 70 - 125 mg/dL    Calcium 7.6 (L) 8.5 - 10.5 mg/dL    BUN 46 (H) 8 - 28 mg/dL    Creatinine 0.53 (L) 0.60 - 1.10 mg/dL    GFR  MDRD Af Amer >60 >60 mL/min/1.73m2    GFR MDRD Non Af Amer >60 >60 mL/min/1.73m2   Magnesium   Result Value Ref Range    Magnesium 1.6 (L) 1.8 - 2.6 mg/dL   Phosphorus   Result Value Ref Range    Phosphorus 2.8 2.5 - 4.5 mg/dL   HM2 (CBC without diff)   Result Value Ref Range    WBC 16.0 (H) 4.0 - 11.0 thou/uL    RBC 2.69 (L) 3.80 - 5.40 mill/uL    Hemoglobin 7.9 (L) 12.0 - 16.0 g/dL    Hematocrit 24.5 (L) 35.0 - 47.0 %    MCV 91 80 - 100 fL    MCH 29.4 27.0 - 34.0 pg    MCHC 32.2 32.0 - 36.0 g/dL    RDW 15.7 (H) 11.0 - 14.5 %    Platelets 197 140 - 440 thou/uL    MPV 9.9 8.5 - 12.5 fL   Urinalysis-UC if Indicated   Result Value Ref Range    Color, UA Yellow Colorless, Yellow, Straw, Light Yellow    Clarity, UA Clear Clear    Glucose, UA Negative Negative    Bilirubin, UA Negative Negative    Ketones, UA Negative Negative    Specific Gravity, UA 1.028 1.001 - 1.030    Blood, UA Negative Negative    pH, UA 5.5 4.5 - 8.0    Protein, UA Negative Negative mg/dL    Urobilinogen, UA <2.0 E.U./dL <2.0 E.U./dL, 2.0 E.U./dL    Nitrite, UA Negative Negative    Leukocytes, UA Large (!) Negative    Bacteria, UA Few (!) None Seen hpf    RBC, UA 0-2 None Seen, 0-2 hpf    WBC, UA 25-50 (!) None Seen, 0-5 hpf    Squam Epithel, UA 5-10 (!) None Seen, 0-5 lpf   Culture, Urine   Result Value Ref Range    Culture 10,000-50,000 col/ml Pseudomonas aeruginosa (!)        Susceptibility    Pseudomonas aeruginosa - PAUL     Aztreonam 8 Sensitive      Cefepime 2 Sensitive      Ceftazidime 4 Sensitive      Ciprofloxacin <=0.5 Sensitive      Gentamicin <=2 Sensitive      Levofloxacin <=1 Sensitive      Meropenem <=0.25 Sensitive      Piperacillin + Tazobactam 8/4 Sensitive      Tobramycin <=2 Sensitive    Lactic Acid   Result Value Ref Range    Lactic Acid 0.4 (L) 0.5 - 2.2 mmol/L   Calcium, Ionized, Measured   Result Value Ref Range    Calcium, Ionized Measured 1.12 1.11 - 1.30 mmol/L    Calcium, Ionized pH 7.4 1.07 (L) 1.11 - 1.30 mmol/L    pH  7.29 (L) 7.35 - 7.45   POCT Glucose   Result Value Ref Range    Glucose, POC 96 mg/dL   Hemoglobin   Result Value Ref Range    Hemoglobin 8.2 (L) 12.0 - 16.0 g/dL   Plasma Product Information   Result Value Ref Range    Status Canceled     Component FROZEN PLASMA    Platelet Product Information   Result Value Ref Range    Status Canceled     Component Platelets    POCT Glucose   Result Value Ref Range    Glucose, POC 98 mg/dL   Crossmatch   Result Value Ref Range    Status Canceled     Component Red Blood Cells    Crossmatch   Result Value Ref Range    Status Canceled     Component Red Blood Cells    POCT Glucose   Result Value Ref Range    Glucose,  mg/dL   Occult Blood, Fecal   Result Value Ref Range    Occult Blood, Stool #1 Positive (!) Negative   POCT Glucose   Result Value Ref Range    Glucose,  mg/dL   Hemoglobin   Result Value Ref Range    Hemoglobin 7.3 (L) 12.0 - 16.0 g/dL   Crossmatch   Result Value Ref Range    Crossmatch INCOMPATIBLE     Blood Expiration Date 20190101235900     Unit Type O Neg     Unit Number [id number]     Status Transfused     Component Red Blood Cells     PRODUCT CODE V3591M98     Issue Date and Time [id number]     Blood Type 9500     CODING SYSTEM [id number]    Crossmatch   Result Value Ref Range    Crossmatch COMPATIBLE     Blood Expiration Date 20190102235900     Unit Type O Neg     Unit Number [id number]     Status Transfused     Component Red Blood Cells     PRODUCT CODE A6704O86     Issue Date and Time [id number]     Blood Type 9500     CODING SYSTEM [id number]    Crossmatch   Result Value Ref Range    Crossmatch COMPATIBLE     Blood Expiration Date 20181231235900     Unit Type A Pos     Unit Number [id number]     Status Transfused     Component Red Blood Cells     PRODUCT CODE B8433K85     Issue Date and Time [id number]     Blood Type 6200     CODING SYSTEM [id number]    Hemoglobin   Result Value Ref Range    Hemoglobin 5.2 (LL) 12.0 - 16.0 g/dL  "  Magnesium   Result Value Ref Range    Magnesium 1.9 1.8 - 2.6 mg/dL   Protime-INR   Result Value Ref Range    INR 1.22 (H) 0.90 - 1.10   Lactic Acid   Result Value Ref Range    Lactic Acid 0.6 0.5 - 2.2 mmol/L   POCT Glucose   Result Value Ref Range    Glucose,  mg/dL   Hemoglobin   Result Value Ref Range    Hemoglobin 7.6 (L) 12.0 - 16.0 g/dL   Hemoglobin   Result Value Ref Range    Hemoglobin 8.0 (L) 12.0 - 16.0 g/dL   Surgical Pathology Exam   Result Value Ref Range    Case Report       Surgical Pathology Report                         Case: [id number]                                    Authorizing Provider:  Al Quiroz MD    Collected:           12/18/2018 2952              Ordering Location:     [facility] OR   Received:            12/18/2018 1770              Pathologist:           Demetris Jalloh MD                                                        Specimen:    Duodenum, Biopsy, Duodenal biopsy of ulcer margin                                          Final Diagnosis       DUODENUM, ULCER, MARGIN, BIOPSY:     -   ACTIVE CHRONIC PEPTIC-TYPE DUODENITIS WITH FOCAL EROSION     -   NEGATIVE FOR DYSPLASIA     -   SEE MICROSCOPIC DESCRIPTION    Microscopic Description       There are two fragments of duodenal mucosa with foveolar metaplasia and foci of active inflammation compatible with active chronic peptic-type duodenitis. There is no evidence of dysplasia or malignancy. Negative for celiac sprue, Giardia and Whipple's disease. Villi and plasma cells are present.    Clinical Information Pre-op Diagnosis:  GI bleeding [K92.2]     Gross Description       The specimen is received in formalin, labeled with the patient's name and \"duodenal biopsy of ulcer margin.\" It consists of two tan fragments measuring 2-3 mm in greatest dimension. TE-1C  JW:db    Charges CPT:  18136  ICD-10:  K26.9     Result Flag  Normal   Hemoglobin   Result Value Ref Range    Hemoglobin 7.5 (L) 12.0 - 16.0 " g/dL   Hemoglobin   Result Value Ref Range    Hemoglobin 7.0 (L) 12.0 - 16.0 g/dL   Crossmatch   Result Value Ref Range    Crossmatch COMPATIBLE     Blood Expiration Date 20190109235900     Unit Type A Pos     Unit Number [id number]     Status Transfused     Component Red Blood Cells     PRODUCT CODE I5904W22     Issue Date and Time [id number]     Blood Type 6200     CODING SYSTEM [id number]    Crossmatch   Result Value Ref Range    Crossmatch COMPATIBLE     Blood Expiration Date 10124701251452     Unit Type A Pos     Unit Number [id number]     Status Transfused     Component Red Blood Cells     PRODUCT CODE X1262C39     Issue Date and Time [id number]     Blood Type 6200     CODING SYSTEM [id number]    Hemoglobin   Result Value Ref Range    Hemoglobin 6.7 (LL) 12.0 - 16.0 g/dL   Magnesium   Result Value Ref Range    Magnesium 1.6 (L) 1.8 - 2.6 mg/dL   Crossmatch   Result Value Ref Range    Status Canceled     Component Red Blood Cells    Type and Screen   Result Value Ref Range    ABORh A POS     Antibody Screen Negative Negative   Hemoglobin   Result Value Ref Range    Hemoglobin 8.8 (L) 12.0 - 16.0 g/dL   Hemoglobin   Result Value Ref Range    Hemoglobin 7.8 (L) 12.0 - 16.0 g/dL   Hemoglobin   Result Value Ref Range    Hemoglobin 7.7 (L) 12.0 - 16.0 g/dL   Crossmatch   Result Value Ref Range    Crossmatch COMPATIBLE     Blood Expiration Date [id number]     Unit Type A Neg     Unit Number [id number]     Status Transfused     Component Red Blood Cells     PRODUCT CODE K1040U79     Issue Date and Time [id number]     Blood Type 0600     CODING SYSTEM [id number]    Magnesium   Result Value Ref Range    Magnesium  1.8 - 2.6 mg/dL   Hemoglobin   Result Value Ref Range    Hemoglobin 7.6 (L) 12.0 - 16.0 g/dL   Magnesium   Result Value Ref Range    Magnesium 1.9 1.8 - 2.6 mg/dL   POCT Glucose   Result Value Ref Range    Glucose,  mg/dL   Hemoglobin   Result Value Ref Range    Hemoglobin 5.0 (LL) 12.0  - 16.0 g/dL   Hemoglobin   Result Value Ref Range    Hemoglobin 9.1 (L) 12.0 - 16.0 g/dL   Hemoglobin   Result Value Ref Range    Hemoglobin 9.2 (L) 12.0 - 16.0 g/dL   POCT Glucose   Result Value Ref Range    Glucose, POC 98 mg/dL   Crossmatch   Result Value Ref Range    Crossmatch COMPATIBLE     Blood Expiration Date 20190114235900     Unit Type A Pos     Unit Number [id number]     Status Transfused     Component Red Blood Cells     PRODUCT CODE P7976Q00     Issue Date and Time [id number]     Blood Type 6200     CODING SYSTEM [id number]    Crossmatch   Result Value Ref Range    Crossmatch COMPATIBLE     Blood Expiration Date 20190114235900     Unit Type A Pos     Unit Number [id number]     Status Transfused     Component Red Blood Cells     PRODUCT CODE B5222E80     Issue Date and Time [id number]     Blood Type 6200     CODING SYSTEM [id number]    Hemoglobin   Result Value Ref Range    Hemoglobin 9.0 (L) 12.0 - 16.0 g/dL   Hemoglobin   Result Value Ref Range    Hemoglobin 8.2 (L) 12.0 - 16.0 g/dL   Hemoglobin   Result Value Ref Range    Hemoglobin 8.9 (L) 12.0 - 16.0 g/dL   Hemoglobin   Result Value Ref Range    Hemoglobin 7.7 (L) 12.0 - 16.0 g/dL   Hemoglobin   Result Value Ref Range    Hemoglobin 7.5 (L) 12.0 - 16.0 g/dL   Hemoglobin   Result Value Ref Range    Hemoglobin 7.6 (L) 12.0 - 16.0 g/dL   Creatinine   Result Value Ref Range    Creatinine 0.53 (L) 0.60 - 1.10 mg/dL    GFR MDRD Af Amer >60 >60 mL/min/1.73m2    GFR MDRD Non Af Amer >60 >60 mL/min/1.73m2   Hemoglobin   Result Value Ref Range    Hemoglobin 7.6 (L) 12.0 - 16.0 g/dL   Hemoglobin   Result Value Ref Range    Hemoglobin 7.4 (L) 12.0 - 16.0 g/dL   Hemoglobin   Result Value Ref Range    Hemoglobin 8.1 (L) 12.0 - 16.0 g/dL   Type and Screen   Result Value Ref Range    ABORh A POS     Antibody Screen Negative Negative   Magnesium   Result Value Ref Range    Magnesium 1.4 (L) 1.8 - 2.6 mg/dL   Crossmatch   Result Value Ref Range    Crossmatch  COMPATIBLE     Blood Expiration Date [id number]     Unit Type A Pos     Unit Number [id number]     Status Ready     Component Red Blood Cells     PRODUCT CODE O3329V12     Blood Type 6200     CODING SYSTEM [id number]    Crossmatch   Result Value Ref Range    Crossmatch COMPATIBLE     Blood Expiration Date [id number]     Unit Type A Pos     Unit Number [id number]     Status Ready     Component Red Blood Cells     PRODUCT CODE M6597X65     Blood Type 6200     CODING SYSTEM [id number]    Hemoglobin   Result Value Ref Range    Hemoglobin 8.5 (L) 12.0 - 16.0 g/dL   Hemoglobin   Result Value Ref Range    Hemoglobin 7.9 (L) 12.0 - 16.0 g/dL   Hemoglobin   Result Value Ref Range    Hemoglobin 8.2 (L) 12.0 - 16.0 g/dL   HM1 (CBC with Diff)   Result Value Ref Range    WBC 10.9 4.0 - 11.0 thou/uL    RBC 3.08 (L) 3.80 - 5.40 mill/uL    Hemoglobin 9.0 (L) 12.0 - 16.0 g/dL    Hematocrit 27.6 (L) 35.0 - 47.0 %    MCV 89 80 - 100 fL    MCH 29.4 27.0 - 34.0 pg    MCHC 32.8 32.0 - 36.0 g/dL    RDW 14.5 11.0 - 14.5 %    Platelets 399 140 - 440 thou/uL    MPV 6.9 (L) 7.0 - 10.0 fL    Neutrophils % 84 (H) 50 - 70 %    Lymphocytes % 7 (L) 20 - 40 %    Monocytes % 5 2 - 10 %    Eosinophils % 3 0 - 6 %    Basophils % 0 0 - 2 %    Neutrophils Absolute 9.2 (H) 2.0 - 7.7 thou/uL    Lymphocytes Absolute 0.8 0.8 - 4.4 thou/uL    Monocytes Absolute 0.6 0.0 - 0.9 thou/uL    Eosinophils Absolute 0.3 0.0 - 0.4 thou/uL    Basophils Absolute 0.0 0.0 - 0.2 thou/uL       Vitals:    12/26/18 1107   BP: 100/60   Pulse: 80   Temp: 98.3  F (36.8  C)   SpO2: 97%   Weight: 112 lb 5 oz (50.9 kg)     Weight: 112 lb 5 oz (50.9 kg)          Physical Exam:  GENERAL APPEARANCE: 78-year-old female, the patient has very minimal hair on her head appears somewhat pale and weak but responded appropriately to questions and for about 5 or 10 minutes she was able to sit up in her chair and answer questions but then she laid down and I finished the time talking to  her family.  Appears somewhat cachectic  SKIN:  Normal skin turgor, no lesions/rashes   HEENT: moist mucous membranes, no rhinorrhea  NECK: Normal without adenopathy or masses  CV: RRR, no M/G/R   LUNGS: CTAB  ABDOMEN: S&NT, no masses or enlarged organs   EXTREMITY: no edema and full ROM of all joints  NEURO: no focal findings

## 2021-06-22 NOTE — TELEPHONE ENCOUNTER
I would recommend getting this checked out either in clinic or the walk-in clinic.    Enoch Vora, CNP

## 2021-06-22 NOTE — TELEPHONE ENCOUNTER
Left message to call back for: patient's daughterIzzy  Information to relay to patient:  How is the swelling? Can she schedule for PCP tomorrow?

## 2021-06-22 NOTE — TELEPHONE ENCOUNTER
"Pt contacted. She would like me to call daughter for further information however she reports the following:    Pt c/o bilateral leg swelling since transfusion. Feet up past knee. Looks like \"sausages\". Pain 2 nights ago, not now. Currently, she has a raised rash on her feet.   No worsening shortness of breath. She reports her swelling is warm to the touch. It is better today than it was 2 days ago.  She has been dx with Positive H pylori. Currently being treated with Clarithromycin 500mg one Q 12 hours x 14 days and  AMOX 500mg two Q12 hours x 14 days.     Can she wait until next week to see Dr Reaves with this new swelling? Should she call [facility] to report the swelling?    Infusion per chart was at [facility] on 12/29 and 1/1/19    "

## 2021-06-22 NOTE — ANESTHESIA CARE TRANSFER NOTE
Last vitals:   Vitals:    12/18/18 1501   BP: 161/64   Pulse: 91   Resp: 14   Temp:    SpO2: 100%     Patient's level of consciousness is drowsy  Spontaneous respirations: yes  Maintains airway independently: yes  Dentition unchanged: yes  Oropharynx: oropharynx clear of all foreign objects    QCDR Measures:  ASA# 20 - Surgical Safety Checklist: WHO surgical safety checklist completed prior to induction    PQRS# 430 - Adult PONV Prevention: 4558F - Pt received => 2 anti-emetic agents (different classes) preop & intraop  ASA# 8 - Peds PONV Prevention: NA - Not pediatric patient, not GA or 2 or more risk factors NOT present  PQRS# 424 - Cathi-op Temp Management: 4559F - At least one body temp DOCUMENTED => 35.5C or 95.9F within required timeframe  PQRS# 426 - PACU Transfer Protocol: - Transfer of care checklist used  ASA# 14 - Acute Post-op Pain: ASA14B - Patient did NOT experience pain >= 7 out of 10

## 2021-06-23 NOTE — PROGRESS NOTES
ASSESSMENT/PLAN:       1. Constipation, unspecified constipation type    - polyethylene glycol (MIRALAX) 17 gram packet; Take 1 packet (17 g total) by mouth daily as needed. constipation  Dispense: 30 each; Refill: 2  The patient was having a great deal of belching and I have prescribed metoclopramide for this but the family has been a little reluctant to use that because of the listed potential side effects.  Otherwise we will continue with her current regiment for her GI problems and constipation.    2. Pain in shoulder region, left  Shared with the patient and her daughter that certainly the MS Contin and even the immediate release morphine sulfate can be increased.  She is having brief periods of time where her pain is more severe but hopefully over time the patient and the family will be able to adjust how the immediate release morphine is given to limit those breakthrough periods of pain.  Also it is likely that using the home oxygen will give her more energy and may help just with her overall pain as well.    3. Acute respiratory failure with hypoxemia (H)  Continue with home oxygen at 2 L per nasal cannula during the bedtime with naps and sleeping at night.    4. Non-small cell carcinoma of left lung, stage 4 (H)  Pleased to hear that some corrective lenses will help with her vision is she really gets a lot of anu out of being able to read.  Otherwise at this point providing comfort care but the patient and family have not been ready to ask for hospice care.  Hospice has done a consultation and met with the family.      25 minutes spent total face-to-face with the patient and her daughter with greater than 50% of that time being spent in counseling in regard to the above problems    Erik Reaves MD      PROGRESS NOTE   2/5/2019    SUBJECTIVE:  Barbie Deluca is a 78 y.o. female  who presents for   Chief Complaint   Patient presents with     Follow-up     eye spec. states there is cholestoral in right  eye, wondering if there is a way to improve it.      1. Constipation, unspecified constipation type  The patient is needing a refill on her MiraLAX.  She feels that her current regimen for her constipation is working quite well.  She actually has had some improvement in her appetite and has gained 2 pounds since her last visit.    2. Pain in shoulder region, left  The patient's pain regiment was switched from OxyContin and oxycodone to morphine Contin and short acting liquid morphine sulfate.  Patient is currently taking the long-acting morphine 30 mg twice a day and morphine sulfate liquid 10 mg every 4-5 hours.  Also was typically using acetaminophen 4 times a day.  She still have periods of time when her pain is quite intense in the left shoulder.  One is quite intense it lasts for minutes.  Right now she stated that her pain was a 1 out of 10.  She did like to see the morphine sulfate immediate release work quicker than it does.    3. Acute respiratory failure with hypoxemia (H)  The patient got her home oxygen yesterday and used it last night and also today with a nap and it seems to have improved her energy level significantly and also the quality of her sleep is improved.  She is using oxygen at 2 L per nasal cannula.    4. Non-small cell carcinoma of left lung, stage 4 (H)  The patient has decided against any treatment for her squamous cell carcinoma of the lung.  She is primarily wanted to make sure she has good pain control.  Quality of life is most important to her at this point.  Fortunately she had a good checkup with an eye doctor who is able to provide a correction for her vision status post transient ischemic attack.    5.  Cerebrovascular accident  The ophthalmologist that saw the patient noted some cholesterol deposits in the retinal vessels on the right side and wondered if she had any kind of imaging to check the carotid arteries.  In December when she was hospitalized with her cerebrovascular  accident she did have a CTA of the neck and had an on the right side she had a 50% stenosis and no other vascular lesions of significance.  Patient Active Problem List   Diagnosis     Hypertension     Hyponatremia     Non-recurrent unilateral inguinal hernia without obstruction or gangrene     Squamous cell carcinoma of left lung (H)     Acute blood loss anemia     Acute anemia     Hemorrhagic shock (H)     Non-small cell carcinoma of left lung, stage 4 (H)     Leukocytosis, unspecified type     Paroxysmal atrial fibrillation (H)     Abdominal pain, generalized     Hypomagnesemia     GI bleeding     Stroke (H)     Acute respiratory failure with hypoxemia (H)       Current Outpatient Medications   Medication Sig Dispense Refill     aspirin 81 MG EC tablet Take 1 tablet (81 mg total) by mouth daily.  0     magnesium chloride (SLOW-MAG) 64 mg TbEC delayed-release tablet Take 1 tablet daily  0     metoprolol tartrate (LOPRESSOR) 25 MG tablet Take 1 tablet (25 mg total) by mouth 2 (two) times a day. 60 tablet 3     morphine (MS CONTIN) 30 MG 12 hr tablet Take 1 tablet (30 mg total) by mouth every 12 (twelve) hours. 20 tablet 0     morphine 10 mg/5 mL solution Take 5 mL (10 mg total) by mouth every 4 (four) hours as needed for pain. 500 mL 0     NON FORMULARY Take 3 capsules by mouth 2 (two) times a day. Essiac tea.       NON FORMULARY Apply topically as needed. Argon oil.       NON FORMULARY Apply topically as needed. Sheyla hip oil.       ondansetron (ZOFRAN) 8 MG tablet Take 4 mg by mouth every 8 (eight) hours as needed for nausea.       pantoprazole (PROTONIX) 40 MG tablet Take 1 tablet (40 mg total) by mouth 2 (two) times a day. 60 tablet 0     polyethylene glycol (MIRALAX) 17 gram packet Take 1 packet (17 g total) by mouth daily as needed. constipation 30 each 2     senna-docusate (PERICOLACE) 8.6-50 mg tablet Take 1 tablet by mouth 2 (two) times a day.              simethicone (MYLICON) 80 MG chewable tablet Chew 1  tablet (80 mg total) every 6 (six) hours as needed for flatulence (bloating).  0     metoclopramide (REGLAN) 5 MG tablet Take 1 tablet (5 mg total) by mouth 3 (three) times a day. 90 tablet 1     multivitamin Chew Chew 2 tablets daily.       No current facility-administered medications for this visit.        Social History     Tobacco Use   Smoking Status Former Smoker     Types: Cigarettes     Last attempt to quit: 2018     Years since quittin.1   Smokeless Tobacco Never Used           OBJECTIVE:        No results found for this or any previous visit (from the past 240 hour(s)).    Vitals:    19 1513   BP: 98/62   Patient Position: Sitting   Cuff Size: Adult Large   Pulse: 60   SpO2: 90%   Weight: 109 lb (49.4 kg)     Weight: 109 lb (49.4 kg)          Physical Exam:  GENERAL APPEARANCE: 78-year-old female seen with her daughter, she seems much more alert and interactive today actually laughing and smiling.    SKIN:  Normal skin turgor, no lesions/rashes   HEENT: moist mucous membranes, no rhinorrhea  NECK: Normal without adenopathy or masses  CV: RRR, no M/G/R   LUNGS: CTAB  ABDOMEN: S&NT, no masses or enlarged organs   EXTREMITY: Inspection of the upper back neck shoulder blade and left upper extremity does not reveal any significant deformities or tenderness to palpation  NEURO: no focal findings     Admission

## 2021-06-23 NOTE — TELEPHONE ENCOUNTER
Left message to call back for: patient  Information to relay to patient:  Can they refax this form?

## 2021-06-23 NOTE — TELEPHONE ENCOUNTER
Request for Orders    PT Evaluation Completed 1/22/2019.    Who s Requesting: Home Care Physical Therapist    Orders being requested:   - Continued PT 1 visit every 1 week for 1 week, 2 visits every 1 week for 2 weeks for gait and transfer training, strengthening and balance exercises, monitoring O2    FYI: Pt's O2 at rest was 90% on RA, decreased to 80% after ambulating approx 20ft and performing sit>sidelying transfer.  O2 increased to 89% after 45 seconds of pursed lip breathing. Patient and her daughter would like patient to get home oxygen.    They are also interested in getting transport wheelchair.    Where to send Orders:   Simply respond to this Epic Telephone Call message string, and we can take as a verbal order if appropriate. Otherwise, you may call Conchis Marquez PT at [phone].  It is ok to leave a detailed message.  Thank you.

## 2021-06-23 NOTE — PROGRESS NOTES
TCM DISCHARGE FOLLOW UP CALL    Discharge Date:  1/12/2019  Reason for hospital stay (discharge diagnosis)::  CVA  Are you feeling better, the same or worse since your discharge?:  Patient is feeling better ((L) field cut continues. Ambulating OK. Speech clear. Denies H/A.)  Do you feel like you have a plan in the event of a health emergency?: No    (RN) Patient provided with information to call us in the event of a health emergency: Yes (Informed dtr of nurse triage, WIC, and calling Cancer Care options)    As part of your discharge plan, were  home care services ordered for you?: Yes    Have you seen them yet, or are they scheduled to visit?: Yes    Do you have any follow up visits scheduled with your PCP or Specialist?:  Yes, with PCP (1/23)  (RN) Is PCP appt scheduled soon enough (within 14 days of discharge date)?: Yes

## 2021-06-23 NOTE — TELEPHONE ENCOUNTER
Controlled Substance Refill Request  Medication:   Requested Prescriptions     Pending Prescriptions Disp Refills     morphine (MS CONTIN) 30 MG 12 hr tablet 20 tablet 0     Sig: Take 1 tablet (30 mg total) by mouth every 12 (twelve) hours.     Date Last Fill: 2/1/19  #20 R-0  Pharmacy: [id number]   Submit electronically to pharmacy  Controlled Substance Agreement on File:   Encounter-Level CSA Scan Date:    There are no encounter-level csa scan date.       Last office visit: 2/5/2019 Erik Reaves MD Katie Beck RN T[facility]

## 2021-06-23 NOTE — TELEPHONE ENCOUNTER
I am not concerned with the Drug-Drug interactions mentions.  I am concerned with her low O2. Does she have oxygen at home?  The Senna taken 1 tab twice daily is fine as long as no issues with constipation. If needs more can take up to 2 tabs twice daily.  If another home visit done please monitor O2 sats again.    Dr. Reaves

## 2021-06-23 NOTE — TELEPHONE ENCOUNTER
Has anyone been able to review the note below. I just don't want her to be in pain over the weekend and not able to get any refills. Thanks

## 2021-06-23 NOTE — PROGRESS NOTES
Hospital discharge follow up call to pt, no answer, no VM. RN will attempt call back at another time.

## 2021-06-23 NOTE — TELEPHONE ENCOUNTER
Who is calling:  Son of patient  Reason for Call:  I would like to talk to Erik Reaves MD about my mothers pain medication.  Date of last appointment with primary care: 1/28/19  Has the patient been recently seen:  Yes  Okay to leave a detailed message: No

## 2021-06-23 NOTE — TELEPHONE ENCOUNTER
I agree with referral for hospice consult and I have placed that order.  Particular assistance needed for pain management, nutritional support, safe mobility, home oxygen and possibly a hospital bed as the patient is much more comfortable if her head is inclined when she is sleeping.

## 2021-06-23 NOTE — TELEPHONE ENCOUNTER
OK with me to OK further PT visits at home for this patient per their request.    Thanks,  Dr. Reaves

## 2021-06-23 NOTE — PROGRESS NOTES
Received a call from home health care that the family would like a different medicine than OxyContin and oxycodone for the patient's pain and she continues to have a lot of hiccups or belching.  Hospice did a consult but they have postponed enrolling in hospice care.  We will discontinue OxyContin and oxycodone and begin MS Contin 30 mg twice a day and morphine sulfate liquid 10 mg per 5 mL's and can take 5 mL's every 4 hours as needed for pain.  Also will give a trial of metoclopramide 5 mg 3 times a day as needed for the reflux belching and hiccups.  Message was given to the home health care nurse who will relay this information to the family.

## 2021-06-23 NOTE — TELEPHONE ENCOUNTER
Start of care completed today for patient after hospital d/c with stroke, s/p lung cancer.    Requesting the following orders:  Skilled nursing 2x/week for 3 weeks and 1x/week for 3 weeks.  Physical Therapy eval and treat  Occupation Therapy eval and treat  Medication  1x/month and 1 PRN  Home Health Aide 1x/week for 3 weeks and 1x/week for 3 weeks    PLEASE NOTE: Patient's O2 saturation was in the low to mid 80's during SNV while patient was at rest. Per DTR patient was tested, but did not qualify for home oxygen before d/c from hospital.  Has f/u with oncology on Wednesday. However, concerned of low oxygen level until then.    Please review the following medication findings:  Medication Discrepencies: Pateint is currently taking senna 1-2 tabs twice a day, instead on 1 tab twice a day  Medication Interactions:  Drug-Drug: clarithromycin and oxyCODONE, oxyCODONE-acetaminophen: The concurrent administration of a CY inhibitor may result in elevated levels of and toxicity from alfentanil,(1,2) benzhydrocodone,(3) fentanyl,(1,2) hydrocodone,(4) and oxycodone(5), including profound sedation, respiratory depression, coma, and/or death. Severe Interaction   Drug-Drug: clarithromycin and ondansetron: The use of ondansetron in patients maintained on agents that prolong the QTc interval may result in potentially life-threatening cardiac arrhythmias, including torsades de pointes. Filtered     Please respond as soon as able. Thank you.

## 2021-06-23 NOTE — PROGRESS NOTES
ASSESSMENT/PLAN:       1. Hypomagnesemia    - magnesium chloride (SLOW-MAG) 64 mg TbEC delayed-release tablet; Take 1 tablet daily; Refill: 0  We will decrease the magnesium supplement to once a day rather than 2 tablets daily and if after a week there is been some improvement and the GI symptoms could even consider discontinuing the magnesium.  I also would like to discontinue the furosemide as she does not have any further edema and furosemide likely was contributing to the low magnesium and also may have other side effects that she is experiencing particularly a metallic taste in her mouth.    2. Chest wall pain  Agree with increasing the OxyContin to 20 mg twice a day and the Percocet with 5 mg of oxycodone to 1 tablet every 6 hours as needed with a maximum of 8 tablets per 24 hours.  We did talk about other pain medication including morphine which would be the next step if we need to go up further.  She has tried the lidocaine patch for her left-sided chest pain and that really did not provide much of any noticeable improvement.    3. Dyspepsia  The patient will continue with simethicone 4 times a day along with using Protonix 40 mg twice a day and Pepcid over-the-counter 1-2 daily.  When she stopped the Pepcid she noticed a noticeable increase in the gas and belching.    4. Non-small cell carcinoma of left lung, stage 4 (H)  Comfort care to include oxygen at home as well as a wheelchair to improve mobility.  Continue with home physical therapy and referral is been placed for hospice consultation.    25 minutes spent total with the patient and daughter face-to-face with greater than 50% of that time being spent in counseling and to the above problems.    I have had an Advance Directives discussion with the patient.    Erik Reaves MD      PROGRESS NOTE   1/28/2019    SUBJECTIVE:  Barbie Deluca is a 78 y.o. female  who presents for   Chief Complaint   Patient presents with     Follow-up     med check,  pain managment    The patient is here today primarily to discuss pain management issues.  1. Hypomagnesemia  The patient is taking 2 magnesium tablets daily which are big tablets hard for her to swallow and she feels that the give her some GI distress.  I note that in the hospital her magnesium level was normal upon discharge.    2. Chest wall pain  The patient has been experiencing increased left-sided chest pain both anterior and posterior in the region of the shoulder and her family increase the OxyContin to 20 mg twice a day from 10 mg twice a day about 4 days ago and that has managed her pain better.  Otherwise the oxycodone with acetaminophen was not lasting for 4 hours.  Now the oxycodone is only needed about every 5-6 hours and typically just 1 pill rather than 2 pills.  I have placed a referral for hospice consultation and also placed a referral for home oxygen and a wheelchair.    3. Dyspepsia  The patient is troubled with excessive belching and this especially bothers her after she drinks.  She does not drink anything carbonated and avoids caffeine.  She is tends not to drink much fluid because she knows that will cause her to be uncomfortable with dyspepsia and belching.  She is taking Protonix 40 mg twice a day and also taking Pepcid at least once a day.    4. 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  She has had someone with her at all time comes at home but that will not be possible very soon now.  She did have a  visit with her last week and the message that I got from the  was a request for the hospice referral.  The patient is DO NOT RESUSCITATE as to CODE STATUS    Patient Active Problem List   Diagnosis     Hypertension     Hyponatremia     Non-recurrent unilateral inguinal hernia without obstruction or gangrene     Squamous cell carcinoma of left lung (H)      Acute blood loss anemia     Acute anemia     Hemorrhagic shock (H)     Non-small cell carcinoma of left lung, stage 4 (H)     Leukocytosis, unspecified type     Paroxysmal atrial fibrillation (H)     Abdominal pain, generalized     Hypomagnesemia     GI bleeding     Stroke (H)     Acute respiratory failure with hypoxemia (H)       Current Outpatient Medications   Medication Sig Dispense Refill     aspirin 81 MG EC tablet Take 1 tablet (81 mg total) by mouth daily.  0     lidocaine 4 % patch Place 1 patch on the skin daily. Remove and discard patch with 12 hours or as directed by MD. Apply to shoulder pain area. 10 patch 0     magnesium chloride (SLOW-MAG) 64 mg TbEC delayed-release tablet Take 1 tablet daily  0     metoprolol tartrate (LOPRESSOR) 25 MG tablet Take 1 tablet (25 mg total) by mouth 2 (two) times a day. 60 tablet 3     multivitamin Chew Chew 2 tablets daily.       NON FORMULARY Take 3 capsules by mouth 2 (two) times a day. Essiac tea.       NON FORMULARY Apply topically as needed. Argon oil.       NON FORMULARY Apply topically as needed. Sheyla hip oil.       ondansetron (ZOFRAN) 8 MG tablet Take 4 mg by mouth every 8 (eight) hours as needed for nausea.       oxyCODONE (OXYCONTIN) 10 mg 12 hr tablet Take 20 mg by mouth every 12 (twelve) hours as needed for pain. Change to 20 mg two times a day on 1/25/19             oxyCODONE-acetaminophen (PERCOCET/ENDOCET) 5-325 mg per tablet Take 1-2 tablets by mouth every 4 (four) hours as needed for pain. Maximum of 9 tablets total per day.             pantoprazole (PROTONIX) 40 MG tablet Take 1 tablet (40 mg total) by mouth 2 (two) times a day. 60 tablet 0     polyethylene glycol (MIRALAX) 17 gram packet Take 1 packet (17 g total) by mouth daily as needed. constipation 30 each 2     senna-docusate (PERICOLACE) 8.6-50 mg tablet Take 1 tablet by mouth 2 (two) times a day.              simethicone (MYLICON) 80 MG chewable tablet Chew 1 tablet (80 mg total) every 6  (six) hours as needed for flatulence (bloating).  0     No current facility-administered medications for this visit.        Social History     Tobacco Use   Smoking Status Former Smoker     Types: Cigarettes     Last attempt to quit: 2018     Years since quittin.1   Smokeless Tobacco Never Used           OBJECTIVE:        Recent Results (from the past 240 hour(s))   Potassium   Result Value Ref Range    Potassium 3.8 3.5 - 5.0 mmol/L       Vitals:    19 1136   BP: 102/78   Patient Position: Sitting   Cuff Size: Adult Large   Pulse: 67   SpO2: 98%   Weight: 107 lb 8 oz (48.8 kg)     Weight: 107 lb 8 oz (48.8 kg)          Physical Exam:  GENERAL APPEARANCE: 78-year-old female here with her daughter, she actually seems a bit more comfortable today and more awake.  Mildly decreased hydration, well nourished  SKIN:  Normal skin turgor, no lesions/rashes   EXTREMITY: no edema and full ROM of all joints  NEURO: no focal findings

## 2021-06-23 NOTE — TELEPHONE ENCOUNTER
Please make copy of note from 1/23/19 with Addenda and fax as requested to try to get home oxygen.    Dr. Reaves

## 2021-06-23 NOTE — TELEPHONE ENCOUNTER
Pt and family are interested in a Hospice consult. Can we have an order to arrange this.  Thank you

## 2021-06-23 NOTE — TELEPHONE ENCOUNTER
Dr. Reaves or covering MD    I got a call from Barbie's son today. She is almost out of her Oxycodone. He said he pain has been increasing and having a harder time staying ahead of the pain. He questioned if they could start Hydromorphone ER, he has a friend that is a Pharm and they dicussed options, and felt this would be best for her. He also is asking about submitting a application for medical cannabis. If you agree to the Hydromorphone can you place an order and refill her Oxycodone?  She did have a Hospice consult yesterday, but did not sign on. She is waiting for a eye apt on Monday and they may sign on to hospice next week. She is not eating or drinking much, she cont to have issue with belching and nausea. I had a patient recently have issues with belching and they had tried sever different drugs, then his MD but him on Reglan which instantly resolved it, do you think this could help her?    Thanks,  Amy Cabrera RN

## 2021-06-23 NOTE — PROGRESS NOTES
ASSESSMENT/PLAN:       1. Impaired vision in both eyes    - Ambulatory referral to Ophthalmology    2. History of CVA (cerebrovascular accident)    - Ambulatory referral to Ophthalmology  Patient will continue with low-dose aspirin daily  3. Hypokalemia    - Potassium  The patient and her daughter will be informed of the potassium level and if still low we will have to consider a potassium supplement    4. Non-small cell carcinoma of left lung, stage 4 (H)  Will try to document if there is a low oxygen levels particularly when supine or through the night sleeping so that we can give justification for home oxygen.  I had a discussion with the patient and her daughter about the advantages of early hospice intervention rather than at the end of life.  They certainly could assist with oxygen, mobility assistance and even a hospital bed if needed in the home.  Will make communication back to the patient and daughter after tomorrow when  comes to the home for a visit.  If home oxygen is needed there okay with using "[employer]" medical supplies    5. Weight loss  The patient and the daughter will continue to work at increasing daily caloric intake as much as possible with selection of her foods and eating small amounts frequently    I did go through the patient's medication list and updated that    ADDENDA: Same day received records from home health care physical therapist who saw the patient yesterday at home and with physical therapy walking for 20 feet oxygen dropped to 80% while on room air..  Prior to the exercise oxygen was had 90% at rest and after 45 seconds of walking 20 feet her oxygen was at 89% and she still had labored breathing and pursed lips.  Today walking 20 feet cause some dyspnea but also she was very weak and could not have walked much more than that without needing to stop and rest or have someone to hold onto.  My recommendation would be for her to use home oxygen at 2 L through the night  and also with activity as needed.  She should have the ability to have portable oxygen if she leaves the home.  I also recommend that she have a manual wheelchair that would be used outside of the home when she really needs to go to appointments.  I do not feel that at this point she needs a wheelchair to be used in the home.  This could be a standard wheelchair for manual use.    Erik Reaves MD      PROGRESS NOTE   1/23/2019    SUBJECTIVE:  Barbie Deluca is a 78 y.o. female  who presents for   Chief Complaint   Patient presents with     Follow-up     O2 , worse when pt is laying down and wheel chair      1. Impaired vision in both eyes  The patient is complaining of visual disturbance since her cerebrovascular accident that was diagnosed earlier this month.  She feels that her visual loss is progressive.  She describes it as patchy vision affects both eyes and at times when trying to look straight ahead or walk it seems like there are curved lines.  She is agreeable to seen in optimal is to see if anything can be done to help her vision.    2. History of CVA (cerebrovascular accident)  When I saw the patient earlier this month with the new neurologic findings the primaries symptoms that are still bothering her is the vision and she has had resolution of the numbness or paresthesias and slight weakness on the left side.  She is taking a low-dose aspirin but no other blood thinners because of her high risk of upper GI bleed with a recent large gastric ulcer.  The patient has had paroxysmal atrial fibrillation with a controlled rate    3. Hypokalemia  Since the patient was placed on furosemide for some lower extremity edema and shortness of breath reticular early with orthopnea her potassium has been low at 3.2 and her daughter has tried to supplement that with dietary foods but would like to recheck that today.    4. Non-small cell carcinoma of left lung, stage 4 (H)  The patient was seen by oncology  yesterday and has had a follow-up CT scan which shows progression in the size of the left lung mass.  The patient has decided to not go forward with additional chemotherapy or immunotherapy.  She continues to have left-sided chest shoulder and arm pain related to her tumor and is on OxyContin twice a day and is using the oxycodone 4-5 tablets/day on average.    5. Weight loss  Over the last 3 weeks the patient has lost another 3 pounds although she states she is just not having as much anorexia or nausea.  But the amount that she eats is very small amounts and tries to do that frequently.    The patient is able to take care of herself independently with some assistance from her daughter.  She is able to toilet and shower independently.  Her daughter has a bed that reclines which has helped with shortness of breath.  The patient is receiving home physical therapy and tomorrow has a consultation with a .  Apparently the physical therapist yesterday when she was evaluated noted her oxygen saturation to be in the low 80s.  This particularly is noticeable when she is supine, sleeping or with activitiy.  In the office today sitting, after walking and also when supine for an extended period of time I did not get the oxygen saturation to go below 90%.  With walking she had some labored breathing but otherwise with lying down seem to be comfortable.  Physical therapy had also made mention that there was a request for a wheelchair that could be used to assist with transportation outside of the home.  Currently she really does not need the wheelchair in the home.    Patient Active Problem List   Diagnosis     Hypertension     Hyponatremia     Non-recurrent unilateral inguinal hernia without obstruction or gangrene     Squamous cell carcinoma of left lung (H)     Acute blood loss anemia     Acute anemia     Hemorrhagic shock (H)     Non-small cell carcinoma of left lung, stage 4 (H)     Leukocytosis, unspecified  type     Paroxysmal atrial fibrillation (H)     Abdominal pain, generalized     Hypomagnesemia     GI bleeding     Stroke (H)     Acute respiratory failure with hypoxemia (H)       Current Outpatient Medications   Medication Sig Dispense Refill     aspirin 81 MG EC tablet Take 1 tablet (81 mg total) by mouth daily.  0     furosemide (LASIX) 40 MG tablet Take 1 tablet (40 mg total) by mouth daily. 30 tablet 0     magnesium chloride (SLOW-MAG) 64 mg TbEC delayed-release tablet Take 2 tablets (128 mg total) by mouth daily.  0     metoprolol tartrate (LOPRESSOR) 25 MG tablet Take 1 tablet (25 mg total) by mouth 2 (two) times a day. 60 tablet 3     multivitamin Chew Chew 2 tablets daily.       NON FORMULARY Take 3 capsules by mouth 2 (two) times a day. Essiac tea.       NON FORMULARY Apply topically as needed. Argon oil.       NON FORMULARY Apply topically as needed. Sheyla hip oil.       ondansetron (ZOFRAN) 8 MG tablet Take 4 mg by mouth every 8 (eight) hours as needed for nausea.       oxyCODONE (OXYCONTIN) 10 mg 12 hr tablet Take 10 mg by mouth every 12 (twelve) hours as needed.              oxyCODONE-acetaminophen (PERCOCET/ENDOCET) 5-325 mg per tablet Take 1-2 tablets by mouth every 4 (four) hours as needed for pain. Maximum of 9 tablets total per day.             pantoprazole (PROTONIX) 40 MG tablet Take 1 tablet (40 mg total) by mouth 2 (two) times a day. 60 tablet 0     polyethylene glycol (MIRALAX) 17 gram packet Take 1 packet (17 g total) by mouth daily as needed. constipation 30 each 2     senna-docusate (PERICOLACE) 8.6-50 mg tablet Take 1 tablet by mouth 2 (two) times a day.              simethicone (MYLICON) 80 MG chewable tablet Chew 1 tablet (80 mg total) every 6 (six) hours as needed for flatulence (bloating).  0     lidocaine 4 % patch Place 1 patch on the skin daily. Remove and discard patch with 12 hours or as directed by MD. Apply to shoulder pain area. 10 patch 0     No current facility-administered  "medications for this visit.        Social History     Tobacco Use   Smoking Status Former Smoker     Types: Cigarettes     Last attempt to quit: 2018     Years since quittin.0   Smokeless Tobacco Never Used           OBJECTIVE:        No results found for this or any previous visit (from the past 240 hour(s)).    Vitals:    19 1010   BP: 128/82   Pulse: 64   SpO2: 94%   Weight: 109 lb (49.4 kg)   Height: 5' 2\" (1.575 m)     Weight: 109 lb (49.4 kg)          Physical Exam:  GENERAL APPEARANCE: 78-year-old female is more awake today than in the past but during our 30-minute visit she did have to lie down a couple times., NAD, well hydrated, well nourished  SKIN:  Normal skin turgor, no lesions/rashes   HEENT: Mildly dry mucous membranes, no rhinorrhea  NECK: Normal without adenopathy or masses  CV: RRR, no M/G/R   LUNGS: CTAB  ABDOMEN: S&NT, no masses or enlarged organs   EXTREMITY: no edema and full ROM of all joints  NEURO: no focal findings    "

## 2021-06-23 NOTE — TELEPHONE ENCOUNTER
I am fine with home oxygen at 2 liters per nasal canula to be used at home and also available to be portable when leaving home.  A transport wheel chair is also OK with me.  Please let me know if there is more needed from me.    Dr. Reaves

## 2021-06-23 NOTE — TELEPHONE ENCOUNTER
Request for Orders    Who s Requesting: Home Care Physical Therapist    Orders being requested:   - Continued PT 2 visits every 1 week for 2 weeks for progression of gait and transfer training, strengthening and balance exercises, continued monitoring O2 sats    Where to send Orders:   Simply respond to this Epic Telephone Call message string, and we can take as a verbal order if appropriate. Otherwise, you may call Conchis Marquez, PT at [phone].  It is ok to leave a detailed message.  Thank you.

## 2021-06-23 NOTE — TELEPHONE ENCOUNTER
"Who is calling:  Cece from [facility]  Reason for Call:  Caller stated Erik Reaves MD must addend the 1/23/19 office note because in the Addenda part, he did not state that when patient's O2 dropped to 80%, he should've added in that it was on room air. Caller stated if this is not added in the notes then insurance will not cover the oxygen supply. Please add in \"room air\", into the notes and then please fax the addendum back to Cece at the fax number provided.    Fax:  [phone]  Date of last appointment with primary care: 1/28/19  Has the patient been recently seen:  Yes  Okay to leave a detailed message: Yes  [phone]      Copied from office visit on 1/23/19:    ADDENDA: Same day received records from home health care physical therapist who saw the patient yesterday at home and with physical therapy walking for 20 feet oxygen dropped to 80%.  Prior to the exercise oxygen was had 90% at rest and after 45 seconds of walking 20 feet her oxygen was at 89% and she still had labored breathing and pursed lips.  "

## 2021-06-23 NOTE — TELEPHONE ENCOUNTER
I talked to the patient's son about my reasoning for using morphine instead a day hydromorphone.  I feel more comfortable with this and there is more flexibility in dosing.  Will use the long-acting MS Contin twice a day 30 mg each time and use the morphine sulfate solution 10 mg every 4 hours as needed for pain.  The OxyContin and oxycodone will be discontinued.  We will try metoclopramide for the hiccups/belching and reflux symptoms.  Encouraged enrollment in hospice.

## 2021-06-24 NOTE — TELEPHONE ENCOUNTER
Patient Returning Call  Reason for call:  Return call  Information relayed to patient:  Caller was informed of the message below. Caller stated that she is unsure if the gabapentin helped last night as patient sleeps fairly well at night. Caller stated that patient had not started the BP med yet.   Patient has additional questions:  No  If YES, what are your questions/concerns:  n/a  Okay to leave a detailed message?: Yes

## 2021-06-24 NOTE — TELEPHONE ENCOUNTER
"Pt's niece is taking care of her this morning.   BP was 166/104 at 9am. Pain is 3/10 currently but \"expanding\". Tylenol extra strength being given now.  No HA or dizziness.      "

## 2021-06-24 NOTE — TELEPHONE ENCOUNTER
She did start a BP med also along with the gabapentin but most likely the new symptoms were related to the gabapentin. I would have her place the gabapentin on hold for now and see if the symptoms improve. Did it seem to help for pain last night?    Dr. Reaves

## 2021-06-24 NOTE — PROGRESS NOTES
ASSESSMENT/PLAN:       1. Non-small cell carcinoma of left lung, stage 4 (H)    - Ambulatory referral to Onc Palliative  - Ambulatory referral to Pain Clinic  - gabapentin (NEURONTIN) 300 MG capsule; 1 daily for 3 days, then increase to 1 twice daily for 3 days and then 1 three times a day  Dispense: 90 capsule; Refill: 1  I like to proceed with a palliative care as well as pain clinic referral.  Because the pain likely has a neuropathic origin I would like to see if gabapentin would provide some benefit.  I am discouraged by how little increasing the opioid has helped her with her pain.  Question if she may get benefit from some type of a nerve block or ablation.  Thus appreciate very much recommendations from palliative care and the pain clinic.    2. Acute chest pain    - Ambulatory referral to Onc Palliative  - Ambulatory referral to Pain Clinic  - gabapentin (NEURONTIN) 300 MG capsule; 1 daily for 3 days, then increase to 1 twice daily for 3 days and then 1 three times a day  Dispense: 90 capsule; Refill: 1    3. Essential hypertension, benign    - hydroCHLOROthiazide (HYDRODIURIL) 25 MG tablet; Take 1 tablet (25 mg total) by mouth every morning.  Dispense: 30 tablet; Refill: 2  - metoprolol tartrate (LOPRESSOR) 50 MG tablet; Take 1 tablet (50 mg total) by mouth 2 (two) times a day.  Dispense: 60 tablet; Refill: 1    Encourage the patient to try to increase her potassium intake through diet with beginning a thiazide diuretic.  Upon return she will need to have her electrolytes checked.  With the use of losartan and lisinopril in the recent past for her blood pressure she had a very unpleasant taste in her mouth which did go away when those medications were stopped.    I have had an Advance Directives discussion with the patient.    25 minutes spent total with the patient face-to-face with greater than 50% of that time being spent in counseling in regard to the above problems  Erik Reaves MD      PROGRESS  NOTE   3/4/2019    SUBJECTIVE:  Barbie Deluca is a 78 y.o. female  who presents for   Chief Complaint   Patient presents with     Hypertension     saturday 3/2/2019 BP was 200/110, Pulse 70, chest pain      1. Non-small cell carcinoma of left lung, stage 4 (H)  The patient is here today because of persistent worsening pain in the left chest.  When I saw her a week ago we increased her long-acting morphine as well as the immediate release morphine and for about 3 days it seemed to be working very nicely but over the weekend the patient started having breakthrough pain that was 9 out of 10 in the left chest and back region.  The pain is worse when she is upright better when she is supine.  For the first couple days after increasing the morphine extended release she was not even needing to take the quick acting morphine but rather just using Tylenol.  Now it seems that taking the MS Contin 60 mg twice a day and the immediate release morphine sulfate 15 mg every 4 hours is not controlling her pain.  She describes the pain as a feeling of pressure throbbing type toothache-like pain in the chest left side.  She has a very large complex mass in the left side of her chest involving the pericardium and the diaphragm that has not been amendable to treatment.  Stage IV disease with no evidence for any brain metastases.  The patient has decided she does not want to pursue other treatment such as immunotherapy which was recommended by oncology as an option.  The patient is using home oxygen at night.    2. Acute chest pain  Is likely that this pain has a neuropathic origin to it as it did appear to be pressing on some major nerves in the area with previous imaging.  During the patient's hospitalization she did have a palliative care consult in December 2018.    3. Essential hypertension, benign  The patient's metoprolol has been increased to 50 mg twice a day and her blood pressure continues to be high.  Question how much of  this high blood pressure is related to her pain.    The patient's daughter and patient herself have been noted more confusion and difficulty finding the right words and sedation since increasing the morphine.  The patient's primary goal is comfort.  Patient Active Problem List   Diagnosis     Hypertension     Hyponatremia     Non-recurrent unilateral inguinal hernia without obstruction or gangrene     Squamous cell carcinoma of left lung (H)     Acute blood loss anemia     Acute anemia     Hemorrhagic shock (H)     Non-small cell carcinoma of left lung, stage 4 (H)     Leukocytosis, unspecified type     Paroxysmal atrial fibrillation (H)     Abdominal pain, generalized     Hypomagnesemia     GI bleeding     Stroke (H)     Acute respiratory failure with hypoxemia (H)       Current Outpatient Medications   Medication Sig Dispense Refill     aspirin 81 MG EC tablet Take 1 tablet (81 mg total) by mouth daily.  0     magnesium chloride (SLOW-MAG) 64 mg TbEC delayed-release tablet Take 1 tablet daily  0     metoprolol tartrate (LOPRESSOR) 50 MG tablet Take 1 tablet (50 mg total) by mouth 2 (two) times a day. 60 tablet 1     morphine (MS CONTIN) 60 MG 12 hr tablet Take 1 tablet (60 mg total) by mouth every 12 (twelve) hours. 60 tablet 0     morphine (MSIR) 15 MG tablet Take 1 tablet (15 mg total) by mouth every 4 (four) hours as needed for pain. 60 tablet 0     multivitamin Chew Chew 2 tablets daily.       NON FORMULARY Take 3 capsules by mouth 2 (two) times a day. Essiac tea.       NON FORMULARY Apply topically as needed. Argon oil.       NON FORMULARY Apply topically as needed. Sheyla hip oil.       ondansetron (ZOFRAN) 8 MG tablet Take 4 mg by mouth every 8 (eight) hours as needed for nausea.       pantoprazole (PROTONIX) 40 MG tablet Take 1 tablet (40 mg total) by mouth 2 (two) times a day. 180 tablet 1     polyethylene glycol (MIRALAX) 17 gram packet Take 1 packet (17 g total) by mouth daily as needed. constipation 30  each 2     senna-docusate (PERICOLACE) 8.6-50 mg tablet Take 1 tablet by mouth 2 (two) times a day. 100 tablet 3     simethicone (MYLICON) 80 MG chewable tablet Chew 1 tablet (80 mg total) every 6 (six) hours as needed for flatulence (bloating).  0     gabapentin (NEURONTIN) 300 MG capsule 1 daily for 3 days, then increase to 1 twice daily for 3 days and then 1 three times a day 90 capsule 1     hydroCHLOROthiazide (HYDRODIURIL) 25 MG tablet Take 1 tablet (25 mg total) by mouth every morning. 30 tablet 2     No current facility-administered medications for this visit.        Social History     Tobacco Use   Smoking Status Former Smoker     Types: Cigarettes     Last attempt to quit: 2018     Years since quittin.2   Smokeless Tobacco Never Used           OBJECTIVE:        No results found for this or any previous visit (from the past 240 hour(s)).    Vitals:    19 1139 19 1142 19 1146   BP: (!) 190/86 180/84 (!) 192/84   Patient Position: Sitting Standing Lying   Cuff Size: Adult Regular Adult Regular Adult Regular   Pulse: (!) 58 (!) 59    SpO2: 94%     Weight: 108 lb 1 oz (49 kg)       Weight: 108 lb 1 oz (49 kg)          Physical Exam:  GENERAL APPEARANCE: 78-year-old female seen today with her daughter, she is experiencing significantly more pain than last week and had to lie down for a large part of the visit because of the pain being less when she was supine lying on her right side, well hydrated, well nourished  SKIN:  Normal skin turgor, no lesions/rashes   HEENT: moist mucous membranes, no rhinorrhea  NECK: Normal without adenopathy or masses  CV: RRR, no M/G/R   LUNGS: CTAB  EXTREMITY: no edema and full ROM of all joints  NEURO: no focal findings  Mental status exam: Patient does answer questions appropriately and is oriented to person place and time.  Thought process seems to be goal-directed

## 2021-06-24 NOTE — TELEPHONE ENCOUNTER
Dr. Reaves of covering provider:    Was out seeing Barbie today, she tells me she is feeling dizzy today, BP was e evated 158/100 first reading, and second reading 147/93 both were sitting, HR 80, o2 93% room air, RR 18. She states she ate more sugar then normal today and yesterday. No other concerns noted.  She did take her BP medication ar ound 9am today. Please adivse.  Thanks,   Amy Cabrera RN CM

## 2021-06-24 NOTE — PROGRESS NOTES
ASSESSMENT/PLAN:       1. Non-small cell carcinoma of left lung, stage 4 (H)    - morphine (MS CONTIN) 60 MG 12 hr tablet; Take 1 tablet (60 mg total) by mouth every 12 (twelve) hours.  Dispense: 60 tablet; Refill: 0  - morphine (MSIR) 15 MG tablet; Take 1 tablet (15 mg total) by mouth every 4 (four) hours as needed for pain.  Dispense: 60 tablet; Refill: 0  Plan will be to increase the MS Contin to 60 mg twice a day and the short acting immediate release morphine to 15 mg every 3-4 hours as needed.  Can use acetaminophen if needed.    2. Acute chest pain    - morphine (MS CONTIN) 60 MG 12 hr tablet; Take 1 tablet (60 mg total) by mouth every 12 (twelve) hours.  Dispense: 60 tablet; Refill: 0  - morphine (MSIR) 15 MG tablet; Take 1 tablet (15 mg total) by mouth every 4 (four) hours as needed for pain.  Dispense: 60 tablet; Refill: 0    3. Drug-induced constipation    - senna-docusate (PERICOLACE) 8.6-50 mg tablet; Take 1 tablet by mouth 2 (two) times a day.  Dispense: 100 tablet; Refill: 3  Happy to see some improvement in her appetite and weight    4. Bleeding ulcer    - pantoprazole (PROTONIX) 40 MG tablet; Take 1 tablet (40 mg total) by mouth 2 (two) times a day.  Dispense: 180 tablet; Refill: 1  We will continue with the Protonix twice a day although in the future we may be able to cut that down to just once a day in the morning because she is no longer using any nonsteroidal anti-inflammatory medications but is taking a low-dose aspirin daily    5. Acute blood loss anemia    - pantoprazole (PROTONIX) 40 MG tablet; Take 1 tablet (40 mg total) by mouth 2 (two) times a day.  Dispense: 180 tablet; Refill: 1  With next return we will recheck her CBC    #6 hypertension  Last week because her blood pressure was running high we increased her metoprolol to 50 mg twice a day and her blood pressure seems to be improved and note that her pulse is 51  We will continue with metoprolol 50 mg twice a day and home health care  nurses will check her blood pressure and pulse               Erik Reaves MD      PROGRESS NOTE   2/25/2019    SUBJECTIVE:  Barbie Deluca is a 78 y.o. female  who presents for   Chief Complaint   Patient presents with     Follow-up     pain management, would like to go back to pill form instead liquid        1. Non-small cell carcinoma of left lung, stage 4 (H)  The patient's appetite is been somewhat better and has gained about 4 pounds.  However the patient's chest pain has been worse.  She is on MS Contin 30 mg twice a day and also morphine sulfate solution 10 mg per 5 mL's and having to use that every 3 hours or even closer together.  The last week the patient slept poorly because of pain.  She describes the pain as being sharp stabbing radiating type pain that can go across her entire upper chest but is predominantly on the left side and goes into the left axilla.  She was supplement with acetaminophen and also other topical measures such as Biofreeze and heat.  Stable diet includes primarily scrambled eggs, hot dogs and cream of wheat    2.  Chronic chest pain  When the patient was on oxycodone and OxyContin her pain level was about the same.  She is using oxygen at night at 2 L.    3. Drug-induced constipation  Bowel movements are about 3 times a week but no blood in the stools or melena    4. Bleeding ulcer  The patient needs a refill on her protonix and has continued to use that twice a day because of the history of a large peptic ulcer and upper GI bleed.  She has had a lot of belching and I did prescribe metoclopramide but she read the potential side effects and has not tried that    5. Acute blood loss anemia  The last hemoglobin was 10.3 January 16, 2019.    Patient Active Problem List   Diagnosis     Hypertension     Hyponatremia     Non-recurrent unilateral inguinal hernia without obstruction or gangrene     Squamous cell carcinoma of left lung (H)     Acute blood loss anemia     Acute anemia      Hemorrhagic shock (H)     Non-small cell carcinoma of left lung, stage 4 (H)     Leukocytosis, unspecified type     Paroxysmal atrial fibrillation (H)     Abdominal pain, generalized     Hypomagnesemia     GI bleeding     Stroke (H)     Acute respiratory failure with hypoxemia (H)       Current Outpatient Medications   Medication Sig Dispense Refill     aspirin 81 MG EC tablet Take 1 tablet (81 mg total) by mouth daily.  0     magnesium chloride (SLOW-MAG) 64 mg TbEC delayed-release tablet Take 1 tablet daily  0     metoprolol tartrate (LOPRESSOR) 25 MG tablet Take 1 tablet (25 mg total) by mouth 2 (two) times a day. (Patient taking differently: Take 50 mg by mouth 2 (two) times a day.       ) 60 tablet 3     morphine (MS CONTIN) 60 MG 12 hr tablet Take 1 tablet (60 mg total) by mouth every 12 (twelve) hours. 60 tablet 0     multivitamin Chew Chew 2 tablets daily.       NON FORMULARY Take 3 capsules by mouth 2 (two) times a day. Essiac tea.       NON FORMULARY Apply topically as needed. Argon oil.       NON FORMULARY Apply topically as needed. Sheyla hip oil.       ondansetron (ZOFRAN) 8 MG tablet Take 4 mg by mouth every 8 (eight) hours as needed for nausea.       pantoprazole (PROTONIX) 40 MG tablet Take 1 tablet (40 mg total) by mouth 2 (two) times a day. 180 tablet 1     polyethylene glycol (MIRALAX) 17 gram packet Take 1 packet (17 g total) by mouth daily as needed. constipation 30 each 2     senna-docusate (PERICOLACE) 8.6-50 mg tablet Take 1 tablet by mouth 2 (two) times a day. 100 tablet 3     simethicone (MYLICON) 80 MG chewable tablet Chew 1 tablet (80 mg total) every 6 (six) hours as needed for flatulence (bloating).  0     morphine (MSIR) 15 MG tablet Take 1 tablet (15 mg total) by mouth every 4 (four) hours as needed for pain. 60 tablet 0     No current facility-administered medications for this visit.        Social History     Tobacco Use   Smoking Status Former Smoker     Types: Cigarettes     Last  attempt to quit: 2018     Years since quittin.1   Smokeless Tobacco Never Used     Review of Systems   Constitutional: Positive for appetite change, fatigue and unexpected weight change. Negative for chills and fever.   Respiratory: Positive for shortness of breath. Negative for cough.    Cardiovascular: Positive for chest pain. Negative for palpitations and leg swelling.         OBJECTIVE:        No results found for this or any previous visit (from the past 240 hour(s)).    Vitals:    19 1053   BP: 128/88   Patient Position: Sitting   Cuff Size: Adult Regular   Pulse: (!) 51   SpO2: 93%   Weight: 114 lb 2 oz (51.8 kg)     Weight: 114 lb 2 oz (51.8 kg)          Physical Exam:  GENERAL APPEARANCE: 78-year-old female here with her NAD, well hydrated, well nourished  At rest the patient does not seem to be in pain note that her O2 saturation is 93% on room air.  SKIN:  Normal skin turgor, no lesions/rashes, loss of hair on her scalp    EXTREMITY: no edema and full ROM of all joints  NEURO: no focal findings

## 2021-06-24 NOTE — TELEPHONE ENCOUNTER
Amy,  My approval for visit to discharge OK.  I did place an order for a manual wheelchair 1/23/19. Did she never get the order?  If she wants to get it paid for by Medicare It may need further forms filled out depending on where she wants to get the wheelchair.    Dr. Reaves

## 2021-06-24 NOTE — TELEPHONE ENCOUNTER
Dr. Reaves or covering provider:    Was out seeing Barbie today BP still elevated 138/100, no symptoms. Also requesting orders for speech therapy, she feels like she has issues with swallowing at times.  Thanks,  Amy Cabrera RN

## 2021-06-24 NOTE — TELEPHONE ENCOUNTER
Dr. Reaves or covering provider:        Requesting order to see Barbie for DC visit from home care 1 nurse visit this week    She was also inquiring about a wheel chair that she could use to leave the home, are you able to assist with ordering?    Thanks,  Amy Cabrera, ANNIE

## 2021-06-24 NOTE — TELEPHONE ENCOUNTER
"  Call from daughter     CC:  /110mmHg in the last 30 min ago at home     > No specific complaints   > No chest pain   >No new  shortness of breath (has lung CA)  > Some new generalized pain they are working on      Has her on supplemental O2 - 93% w/ supplemental      A/P:     > I will message your provider to follow up with you tomorrow to discuss medication adjustments as needed     Okay to leave a detailed message on voicemail.  Tele# [phone]        > Please call back if BP are continuing to climb or new sx are reported         I called Dr Boateng and related the above - noted that she is near the \"see in office today\" guidance - he is ok with the approach of messaging provider for follow up         Rajendra Mario RN   Triage and Medication Refills            Reason for Disposition    Systolic BP >= 180 OR Diastolic >= 110    Protocols used: HIGH BLOOD PRESSURE-A-OH      "

## 2021-06-24 NOTE — TELEPHONE ENCOUNTER
I would suggest that the patient increase the metoprolol to 50 mg twice daily.  She can take two 25 mg tabs twice daily until those are gone and then I can change the prescription to 50 mg tabs twice daily.  OK to extend nursing orders for 1 visit weekly X 2 more weeks.  Thank you,  Dr. Reaves

## 2021-06-24 NOTE — TELEPHONE ENCOUNTER
Please check with the patient or daughter as to how her pain control is? Do they have another BP reading from this AM?    Dr. Reaves

## 2021-06-24 NOTE — TELEPHONE ENCOUNTER
Dr. Reaves or reynaldo DODGE;    Was out seeing Barbie today in her home. She continues to have elevated BP, has her taking BP 2x per day since last week. Average is 158/100, this is also reading I got today. HR WNL average 60-90. No other symptoms. She is taking Metoprolol two times a day.   Please advise.    Also can I extend nursing orders for 1 visit per week for 2 more weeks.   Thanks,  Amy Cabrera RN

## 2021-06-25 NOTE — TELEPHONE ENCOUNTER
Would like  order to continue to see Barbie for HTN and medication changes, would it be ok to continue skilled nursing for 3 more week 1x per week and 2 PRN.      I have encouraged her to sign on with hospice, I am not sure what they are waiting on, but would like to cont to see her and then hopefully I can DC her to hospice.    Thanks, Amy Cabrera, RN

## 2021-06-25 NOTE — TELEPHONE ENCOUNTER
Orders being requested:  Advise on BP medications     Reason service is needed/diagnosis:  Patient BP is 90/54 today and is on Metoprolol, HCTZ and Norvsc  When are orders needed by: asap  Where to send Orders: Phone:  [phone]  Okay to leave detailed message?  Yes

## 2021-06-25 NOTE — TELEPHONE ENCOUNTER
Spoke with hospice, she is not having swelling in her legs  So they will stop her HCTZ and keep the Norvasx 10 since she is not currently having any swelling in her legs.

## 2021-06-25 NOTE — TELEPHONE ENCOUNTER
I have sent a prescription for Amlodipine 5 mg daily to her pharmacy. Will need to continue the other blood pressure meds as well and monitor BP daily if possible.  Thanks,  Dr. Reaves

## 2021-06-25 NOTE — PROGRESS NOTES
I will see what I can do re the wheel chair. I am filling in for one of my staff, so I'm not familiar with the DME ordering process. I 'm spoiled to have social workers who typically take care of these details. I'll do what I can.    Sherrie Krause MD

## 2021-06-25 NOTE — CONSULTS
This is a 78-year-old female seen as a new patient in the oncology palliative care clinic with a diagnosis of stage IV left squamous cell lung cancer.  Patient had attempted chemotherapy but did not tolerate this.  She was hospitalized with pneumonia and then had a follow-up hospitalization with a gastrointestinal bleed from a duodenal ulcer caused by nonsteroidal anti-inflammatory medication use.  She is now taking Protonix 40 mill grams twice daily.  She is seen primarily for symptoms of quite severe chest pain which radiates up into her shoulder and down her arm.  This is been accelerating in severity over the last 2 or 3 weeks.  She is currently taking extended release morphine 60 mg twice daily and immediate release 15 mg tablets approximately every 4 hours.  She also complains of quite severe fatigue, anxiety, drowsiness, shortness of breath, and generalized poor overall sense of well-being.  Her chest pain is moderately positional.  It is typically worse when she is in upright position and better when she is lying down.  However, it can occur in any position.  She has been advised by her oncologist that her current functional status is poor enough that she is not really candidate for further chemotherapy or immunologic therapy.  Her primary care physician had recommended hospice.  The patient had not yet filled out an advance care directive.  This was completed and notarized during the visit with Patricia, the  and marianela in the office.    Physical exam: Blood pressure 101/48, heart rate 60, respiratory rate 16, temperature 97.5, and oxygenation 94% on room air.    The patient was lying on her right side covered in a blanket.  She appears cachectic and fatigued.  She did not appear in acute respiratory distress or acute pain, currently.  She had had a recent dose of morphine.    Assessment/plan:    Metastatic squamous cell carcinoma of the left lung with no further plans for chemo, immunotherapy, or  radiation therapy.  The patient's primary care doctor, Dr. House, had made a referral to the pain clinic.  I made a phone call to the clinic and they have an order for an intercostal block.  The soonest they could perform this would be in 9 days.  I had a lengthy discussion with the patient and her daughter about possible hospice care.  They are primarily interested in getting the pain under control and concerned that the intercostal block might be unsuccessful or might have to be repeated.  Therefore, they have chosen to have a hospice consultation as soon as possible and decide whether or not they prefer to go directly to hospice care.  An ambulatory referral to hospice was placed and she will be seen tomorrow for an informational and potential same-day start of care.    In an effort to control her pain, we will increase her MS Contin to 60 mg 3 times daily and continue the MS IR 15 mg every 4 hours as needed.  Additionally, we will begin methadone 2.5 mg at bedtime for its better effects of neuropathic pain.  If she comes on to hospice, we can adjust this within a few days or certainly within a week.  Finally, will begin dexamethasone 4 mg each morning with breakfast.  She will continue her Protonix and Pepcid, and warn us if she has any symptoms of gastritis or heartburn.  We did discuss the risk of recurrence of her bleeding ulcer, but our hope is that the steroids could shrink any swelling around the tumor and decrease pressure on her nerves which are causing the pain.  She is to call or return to the palliative care clinic if symptoms are not controlled and she is not on hospice.    Disposition: Again, and advanced directive document was completed here in the office and notarized, appointing her daughter, Izzy, as her surrogate decision-maker.    Total time spent with the patient was 70 minutes.  Greater than 50% of this was in counseling, emotional support, education, and care management.

## 2021-06-25 NOTE — PROGRESS NOTES
Pt in w/c with daughter to radiation clinic for initial palliative care consult. Pt very tired and wanting to lay down. Pt with increased difficulty with burping when drinking any fluids. Pain is in her left shoulder, left breast, and left chest. Morphine helpful but is lasting less amount of time. Further recommendations and orders per palliative care team.

## 2021-06-25 NOTE — TELEPHONE ENCOUNTER
Dr. Reaves or covering provider:    Out seeing Barbie and her BP is elevated 170/108, no ss of HTN. She is taking medications as ordered, pain is at her baseline today, but was more intense Monday into Tuesday, they were up all night fighting the pain. Her family has been taking her BP several times daily, toda:    5am=  176/108  8am=  179/111  1pm=  165/97  3pm=  158/97      Please advise,  Thanks Amy Cabrera RN

## 2021-06-25 NOTE — PROGRESS NOTES
Palliative Care Team met with pt and dtr Izzy.  Dr Segovia went over pt's symptoms and medications.  Writer is aware pt is living with dtr/ Izzy and her son is with pt when dtr works.  Pt would like a w/c and shared she is struggling with her quality of care bc of her symptoms. Pt is not doing any active treatment at this time and has been recommended to sign onto hospice.  Pt and dtr had felt they were able to manage however pt pain is getting worse along with fatigue.  Writer and Dr Segovia   Went over Hospice and the benefits of Hospice. Pt and dtr shared primary MD and HC RN has recommended Hospice.   Pt and dtr are ready to hear more about Hospice and likely will sign onto Hospice.  Writer coordinated with Smita and pt will be seen tomorrow by Hospice.  Pt did complete a HCD while in clinic today and it was notarized and copies made.   Pt appears alert however did defer to dtr for many questions bc of her memory.  Pt is aware her cancer is progressing and her goal is to be kept comfortable.  Pt and dtr appear open to signing onto Hospice.  Writer provided a resource list along with writer's name and number.of questions arise.  Patricia RAYA

## 2021-06-26 NOTE — PROGRESS NOTES
Progress Notes by Hesham Graham MD at 10/25/2018  1:30 PM     Author: Hesham Graham MD Service: -- Author Type: Physician    Filed: 10/25/2018  2:51 PM Encounter Date: 10/25/2018 Status: Signed    : Hesham Graham MD (Physician)       THORACIC SURGERY OFFICE NEW PATIENT VISIT      Dear Dr. Reaves,    I saw Ms. Deluca at Dr. Winchester request in consultation for the evaluation and treatment of a mediastinal mass.     HPI  MS. Barbie Deluca is a 78 y.o. 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 She has had an unintentional weight loss of 20 lbs, fatigue, exertional dyspnea, palpitations when dyspneic, and LEFT next and shoulder pain.  Tests   CT with contrast (10/24/2018): 7.5 cm LEFT hilar and mediastinal mass, no clear separation between pericardium and mass, cannot tell if it is invading LEFT upper lobe. Elevated LEFT hemidiaphragm.      Echo (10/17/2018): normal, trace pericardial effusion    Lab (10/23/2018): CBC normal, Na 136, ACTH normal    PMH    Past Medical History:   Diagnosis Date   ? Hypertension    ? Miscarriage     x2   ? Normal delivery     x5        ETOH rare  TOB 40 pack years, current smoker but weaning off and she now has a nicotine patch.    Physical examination  BMI 23  Thin, appears of her stated age.    From a personal perspective, she is here alone. She is a retired  and her daughter lives with her. She enjoys gardening.      IMPRESSION   1. Mediastinal mass        78 y.o. year-old female with mediastinal mass  LEFT hemidiaphragm paralysis  Small pericardial effusion    PLAN  I spent a total of 45 minutes with Ms. Deluca , more than 50% of which were spent in counseling, coordination of care, and face-to-face time. I reviewed the plan as follows:    1) Cardiac MRI  2) Brain MRI  3) Needle biopsy: we have to differentiate between thymic  carcinoma or lymphoma versus other diseases.  4) Follow-up next week with test results at [facility] to avoid further delays.  5) Left-sided neck and shoulder pain: she has not been evaluated for ischemia, I will also ask for a stress test when I see her next time in clinic.    They had all their questions answered and were in agreement with the plan.  I appreciate the opportunity to participate in the care of your patient and will keep you updated.    Sincerely,

## 2021-07-03 NOTE — ADDENDUM NOTE
Addendum Note by Erik Reaves MD at 1/23/2019  3:34 PM     Author: Erik Reaves MD Service: -- Author Type: Physician    Filed: 1/23/2019  3:34 PM Encounter Date: 1/22/2019 Status: Signed    : Erik Reaves MD (Physician)    Addended by: ERIK REAVES on: 1/23/2019 03:34 PM        Modules accepted: Orders, SmartSet

## 2021-07-03 NOTE — ADDENDUM NOTE
Addendum Note by Gwen Douglass RN at 10/25/2018  4:06 PM     Author: Gwen Douglass RN Service: -- Author Type: Registered Nurse    Filed: 10/25/2018  4:06 PM Encounter Date: 10/25/2018 Status: Signed    : Gwen Douglass RN (Registered Nurse)    Addended by: GWEN DOUGLASS on: 10/25/2018 04:06 PM        Modules accepted: Orders

## 2021-07-03 NOTE — ADDENDUM NOTE
Addendum Note by Zo Jean CMA at 1/23/2019 10:03 AM     Author: Zo Jean CMA Service: -- Author Type: Certified Medical Assistant    Filed: 1/23/2019 10:03 AM Encounter Date: 1/22/2019 Status: Signed    : Zo Jean CMA (Certified Medical Assistant)    Addended by: ZO JEAN on: 1/23/2019 10:03 AM        Modules accepted: Orders

## 2022-12-06 NOTE — NURSING NOTE
"Oncology Rooming Note    November 7, 2018 10:38 AM   Barbie Deluca is a 78 year old female who presents for:    Chief Complaint   Patient presents with     Oncology Clinic Visit     [facility] NEW- MEDIASTINAL MASS     Initial Vitals: /82 (BP Location: Right arm, Patient Position: Chair, Cuff Size: Adult Regular)  Pulse 104  Temp 97.7  F (36.5  C) (Oral)  Resp 16  Ht 1.57 m (5' 1.81\")  Wt 57 kg (125 lb 11.2 oz)  SpO2 97%  BMI 23.13 kg/m2 Estimated body mass index is 23.13 kg/(m^2) as calculated from the following:    Height as of this encounter: 1.57 m (5' 1.81\").    Weight as of this encounter: 57 kg (125 lb 11.2 oz). Body surface area is 1.58 meters squared.  Moderate Pain (4) Comment: Data Unavailable   No LMP recorded.  Allergies reviewed: Yes  Medications reviewed: Yes    Medications: Medication refills not needed today.  Pharmacy name entered into EPIC: Data Unavailable    Clinical concerns: New patientWai Graham was notified.    10 minutes for nursing intake (face to face time)     Dav Valencia LPN            " Has The Growth Been Previously Biopsied?: has not been previously biopsied Body Location Override (Optional): Left lateral zygoma

## 2025-01-09 NOTE — TELEPHONE ENCOUNTER
2nd Attempt LVM    I would agree that we need to make a change in her blood pressure medication.  If she is having swelling in her lower legs and ankles I would suggest that we decrease the Norvasc from 10 mg daily to 5 mg daily and a believe they should be able to cut that 10 mg tablet in half.  If she is not having swelling in the ankles or lower legs then I would stop the hydrochlorothiazide and continue her other medications.    Please let the family know about this I do believe that she is enrolled in hospice as of today so I am not sure that the number that is listed is for the family or for hospice?    Dr. House

## (undated) DEVICE — DECANTER BAG 2002S

## (undated) DEVICE — SU DERMABOND ADVANCED .7ML DNX12

## (undated) DEVICE — LINEN TOWEL PACK X5 5464

## (undated) DEVICE — GOWN XLG DISP 9545

## (undated) DEVICE — COVER ULTRASOUND PROBE W/GEL FLEXI-FEEL 6"X58" LF  25-FF658

## (undated) DEVICE — Device

## (undated) DEVICE — LINEN GOWN XLG 5407

## (undated) DEVICE — KIT INTRODUCER FLUENT MICRO 5FRX10CM ECHO TIP KIT-038-04

## (undated) DEVICE — KNIFE HANDLE W/15 BLADE 371615

## (undated) DEVICE — PACK CENTRAL LINE INSERTION SAN32CLFCG

## (undated) DEVICE — SU VICRYL 3-0 SH 27" J316H

## (undated) DEVICE — SU MONOCRYL 4-0 P-3 18" UND Y494G

## (undated) DEVICE — GLOVE PROTEXIS POWDER FREE SMT 8.0  2D72PT80X

## (undated) RX ORDER — HEPARIN SODIUM (PORCINE) LOCK FLUSH IV SOLN 100 UNIT/ML 100 UNIT/ML
SOLUTION INTRAVENOUS
Status: DISPENSED
Start: 2019-01-01

## (undated) RX ORDER — CEFAZOLIN SODIUM 1 G/3ML
INJECTION, POWDER, FOR SOLUTION INTRAMUSCULAR; INTRAVENOUS
Status: DISPENSED
Start: 2018-01-01

## (undated) RX ORDER — GABAPENTIN 100 MG/1
CAPSULE ORAL
Status: DISPENSED
Start: 2018-01-01

## (undated) RX ORDER — ALBUTEROL SULFATE 0.83 MG/ML
SOLUTION RESPIRATORY (INHALATION)
Status: DISPENSED
Start: 2018-01-01

## (undated) RX ORDER — ACETAMINOPHEN 325 MG/1
TABLET ORAL
Status: DISPENSED
Start: 2018-01-01